# Patient Record
Sex: MALE | Race: WHITE | Employment: UNEMPLOYED | URBAN - METROPOLITAN AREA
[De-identification: names, ages, dates, MRNs, and addresses within clinical notes are randomized per-mention and may not be internally consistent; named-entity substitution may affect disease eponyms.]

---

## 2017-01-07 LAB — CREATININE, EXTERNAL: 0.78

## 2017-01-11 ENCOUNTER — TELEPHONE (OUTPATIENT)
Dept: INTERNAL MEDICINE CLINIC | Age: 57
End: 2017-01-11

## 2017-01-11 NOTE — TELEPHONE ENCOUNTER
Spoke with Pt's sister Chris Deo [phone], states Dr Chelsea Etienne nurse called her that Kenneth's labs yesterday showed a glucose of 346. He has an appt. Fri. 1p since she is off. He is living with her since dec. 1. Their brother Nereida clayton is this sat. Fabian Khan is unsure of Kenneth's medical history, states she thinks he is autistic but I don't see that on his chart. She said he eats whatever he has until it's gone, he never says he's full so she will try to do better with his portions. He drinks a protein shake and prune juice every morning and some milk, drinks ice tea the rest of the day with sweet n low, no water. She may need to try flavored water. Fabian Khan also mentioned she and her  had to take custody of 3 of their grand children last year and she works in [place]. She wants to do whatever they all need and just needs some guidance. Emotional support given, encouraged to call with any questions.

## 2017-01-13 ENCOUNTER — OFFICE VISIT (OUTPATIENT)
Dept: INTERNAL MEDICINE CLINIC | Age: 57
End: 2017-01-13

## 2017-01-13 VITALS
SYSTOLIC BLOOD PRESSURE: 110 MMHG | WEIGHT: 186 LBS | HEART RATE: 104 BPM | RESPIRATION RATE: 20 BRPM | DIASTOLIC BLOOD PRESSURE: 70 MMHG | TEMPERATURE: 98.2 F | BODY MASS INDEX: 28.19 KG/M2 | OXYGEN SATURATION: 97 % | HEIGHT: 68 IN

## 2017-01-13 DIAGNOSIS — E78.00 PURE HYPERCHOLESTEROLEMIA: ICD-10-CM

## 2017-01-13 DIAGNOSIS — I10 ESSENTIAL HYPERTENSION WITH GOAL BLOOD PRESSURE LESS THAN 140/90: ICD-10-CM

## 2017-01-13 DIAGNOSIS — E11.29 DIABETES MELLITUS WITH MICROALBUMINURIA (HCC): Primary | ICD-10-CM

## 2017-01-13 DIAGNOSIS — R80.9 DIABETES MELLITUS WITH MICROALBUMINURIA (HCC): Primary | ICD-10-CM

## 2017-01-13 DIAGNOSIS — G93.1 HYPOXIC BRAIN DAMAGE (HCC): ICD-10-CM

## 2017-01-13 DIAGNOSIS — H61.23 EXCESSIVE CERUMEN IN EAR CANAL, BILATERAL: ICD-10-CM

## 2017-01-13 DIAGNOSIS — E66.3 OVERWEIGHT (BMI 25.0-29.9): ICD-10-CM

## 2017-01-13 NOTE — PATIENT INSTRUCTIONS
Hearing Evaluations: [facility]   At [employer]. Mobile Authentication  [phone]    [facility] (off [address])  [place] ([address])  [place] ([facility])  [place] (off [address])      [address] [facility]   [email or web address]    [phone] --> [address] [facility]  [phone] --> [address] [facility]  ([place])  [phone] --> [phone] [facility]  ([address] [facility])

## 2017-01-13 NOTE — LETTER
[address] Proxy Access Authorization Form Name: Ani Cronin Patient Email: [email or web address] Patient YOB: 1960 Patient MRN: [id number] Name of Proxy: Saint Bowers. lAiyah Spicer Proxy Email: [email or web address] Proxy Address: [facility] German Galeano Proxy : 1967 Proxy SSN:  By signing this [email or web address] Proxy Access Authorization Form (this Authorization), I understand that I am giving permission to the [address] [place] [address] and its controlled affiliates that operate one or more hospitals or physician practices located in [place], [place], [place], [place], [address] [place] [address] or [facility]) to disclose confidential health information contained about me through [email or web address] to the person whose name is designated above (my Proxy). I understand that [employer] is a web-based service through which some (but not all) of the information contained in my i[employer] record [facility]) (to the extent that I have an EMR) may be accessed, and that [employer] sometimes shows a summary or description and not the actual entries in my EMR. I understand that by signing this Authorization, my Proxy will be given electronic access through [employer] to all confidential health information about me that is available through [email or web address] [address], including confidential health information about me that under most circumstances my Proxy would not be able to access without my permission. I understand that I am not required to name a Proxy or sign this Authorization. I further understand that Charlie Alexander may not condition treatment or payment on my willingness to sign this Authorization unless the specific circumstances under which such conditioning is permitted by law are applicable and are set forth in this Authorization.  
 
I understand that this Authorization is valid unless and until I revoke it.  I understand that I have the right to revoke this Authorization at any time, but that my revocation will not be effective until delivered in writing to [facility] at the following address:  
 
[address] If I choose to revoke this Authorization, I understand that my revocation will not be effective as to any MyChart information already disclosed to my Proxy pursuant to this Authorization. I understand that MyChart access is a privilege, not a right, and that my Proxy must agree to comply with the MyChart Terms and Conditions of Patient Use (the Terms and Conditions). [facility] will provide my Proxy a special activation code and instructions for accessing confidential health information about me in [address] The first time my Proxy uses the special activation code, my Proxy must review and accept the Terms and Conditions and the Proxy Disclaimer. If my Proxy does not accept and at all times comply with the Terms and Conditions or does not accept the Proxy Disclaimer each time my Proxy accesses MyChart, I understand that [facility] may deny my Proxy access or revoke my Proxys to access confidential health information about me in [address] I also understand that [facility] may deny my Proxy access or revoke my Proxys access for any reason and at any time in [facility] sole discretion. I understand that my Proxy must sign the Acknowledgement set forth below if my Proxy is in the office with me at the time I complete this request.  If my Proxy is not in the office with me, I understand that my Proxy will be mailed a Proxy Identification Verification for Access to [facility] form at the address I have designated above, and that my Proxy must complete and return the form to [facility] before [facility] will take any additional steps to give my Proxy access information about me in [address] A copy of this Authorization and a notation concerning my Proxy shall be included in my original health records. I understand that confidential health information about me disclosed in MyChart to my Proxy pursuant to this Authorization might be redisclosed by my Proxy and may, as a result of such disclosure, no longer be protected to the same extent as such confidential health information was protected by law while solely in the possession of Iwona Cordon. Signature of Patient or Legal Guardian Date (MM/DD/YYYY) Printed Name of Patient or Legal Guardian Relationship (if not self) ACKNOWLEDGEMENT TO BE COMPLETED BY PROXY IF IN OFFICE: 
I acknowledge and agree that the above information, including my name, e-mail address, date of birth, Social Security Number, and mailing address are true and correct. I further agree to comply with the Terms and Conditions and Proxy Disclaimer. Proxy Signature Date (MM/DD/YYYY) Printed Name of Proxy Identification Document:   
 
__ s License/Government Issued ID   
__ Passport   
__ Picture ID & Social Security Card Identification Document Number _______________________________ Expiration Date ______________

## 2017-01-13 NOTE — PROGRESS NOTES
Follow up. Not checking BS at home. Received a glucose reading of 346 from labs drawn last week. Colonoscopy done in [facility], provider not known, done around 2010. Sister accompanies patient. Asking to have his ears checked for wax.

## 2017-01-13 NOTE — PROGRESS NOTES
Mendel Frisk is a 60 y.o. male who was seen in clinic today (2017). Accompanied by sister. He is now living with her after Ed passed away. Assessment & Plan:  Tate Urias was seen today for diabetes, hypertension and cholesterol problem. Diagnoses and all orders for this visit:    Diabetes mellitus with microalbuminuria ([facility] [address]- control uncertain, previously well controlled but last random labs had BS > 300, home glucose monitoring emphasized, especially over the next 1-2 weeks to get an idea of what his sugars are truly doing and continue current medications pending review of labs. Exclusions: none   -     METABOLIC PANEL, COMPREHENSIVE  -     LIPID PANEL  -     HEMOGLOBIN A1C WITH EAG  -     MICROALBUMIN, UR, RAND W/ MICROALBUMIN/CREA RATIO  -     HM DIABETES FOOT EXAM    Hypoxic brain damage (HCC)- stable    Hypertension- well controlled, continue current treatment pending review of labs     Hyperlipidemia- previously at goal, continue current treatment pending review of labs     Excessive cerumen in ear canal, bilateral- minimal, but will irrigate due to reported decrease in hearing. Did review if no changes should see ENT or audiologist.  No documentation to back it up, but brother reported previously decreased hearing (R>L)  -     [place] REMOVAL IMPACTED CERUMEN IRRIGATION/LVG UNILAT    Overweight (BMI 25.0-29. 9)- stable, needs improvement, discussed the patient's above normal BMI with him. The follow up plan is as follows: I have counseled this patient on diet and exercise regimens       Will address Prevnar at next visit. Follow-up Disposition:  Return in about 6 months (around 2017).    ----------------------------------------------------------------------    Subjective:  Endocrine Review  He is seen for diabetes. Since last visit he reports: his brother . He is now living with his sister.   Home glucose monitoring: is not performed  He reports medication compliance: compliant all of the time.  Medication side effects: none. Diabetic diet compliance: noncompliant much of the time. Lab review: labs reviewed and discussed with patient (snacking regularly). Eye exam: UTD. Cardiovascular Review  The patient has hypertension and hyperlipidemia. He reports taking medications as instructed, no medication side effects noted, patient does not perform home BP monitoring. Diet and Lifestyle: generally follows a low fat low cholesterol diet, generally follows a low sodium diet, sedentary. Lab review: labs reviewed and discussed with patient. Prior to Admission medications    Medication Sig Start Date End Date Taking? Authorizing Provider   JANUMET  mg per tablet TAKE ONE TABLET BY MOUTH TWICE A DAY WITH A MEAL 9/1/16  Yes Solomon Luna MD   lovastatin (MEVACOR) 40 mg tablet TAKE ONE TABLET BY MOUTH EVERY NIGHT AT BEDTIME 9/1/16  Yes Solomon Luna MD   trandolapril ([address]) 2 mg tablet TAKE ONE TABLET BY MOUTH DAILY 8/26/16  Yes Solomon Luna MD   ibrutinib (IMBRUVICA) 140 mg capsule Take 420 mg by mouth. Yes Historical Provider   multivitamin (ONE A DAY) tablet Take 1 Tab by mouth daily. Yes Historical Provider   aspirin delayed-release 81 mg tablet Take 1 Tab by mouth daily. 8/16/16   Solomon Luna MD   Insulin Needles, Disposable, (ULTRA-THIN II INS PEN NEEDLES) 29 x 1/2 \" ndle Lantus 10 units daily. DX:250.00 7/28/15   Solomon Luna MD   Blood-Glucose Meter monitoring kit Test BS daily. DX:250.00 7/28/15   Solomon Luna MD   Lancets misc Test BS daily. DX:250.00 7/28/15   Solomon Luna MD   glucose blood VI test strips (ASCENSIA AUTODISC VI, ONE TOUCH ULTRA TEST VI) strip Test BS daily. DX:250.00 7/28/15   Solomon Luna MD          Allergies   Allergen Reactions    Pcn [Penicillins] Anaphylaxis           Review of Systems   Constitutional: Negative for malaise/fatigue and weight loss. HENT: Positive for hearing loss. Respiratory: Negative for cough and shortness of breath. Cardiovascular: Negative for chest pain and palpitations. Gastrointestinal: Negative for abdominal pain, constipation, diarrhea, nausea and vomiting. Musculoskeletal: Negative for joint pain and myalgias. Objective:   Physical Exam   HENT:   Right ear is: 50% occluded with wet cerumen. Left ear is: 25% occluded with wet cerumen. Cardiovascular: Regular rhythm and normal heart sounds. No murmur heard. Pulmonary/Chest: Effort normal and breath sounds normal. He has no wheezes. He has no rales. Abdominal: Soft. Bowel sounds are normal. He exhibits no mass. There is no hepatosplenomegaly. There is no tenderness. Musculoskeletal: He exhibits no edema. Neurological:   Monofilament intact bilaterally. Pulses R: 1+ and L: 1+. L foot there is hypertrophied skin at the base of the 1st toe. No open lesion   Psychiatric: He has a normal mood and affect. His behavior is normal.         Visit Vitals    /70    Pulse (!) 104    Temp 98.2 °F (36.8 °C) (Oral)    Resp 20    Ht 5' 8\" (1.727 m)    Wt 186 lb (84.4 kg)    SpO2 97%    BMI 28.28 kg/m2         Disclaimer:  Advised him to call back or return to office if symptoms worsen/change/persist.  Discussed expected course/resolution/complications of diagnosis in detail with patient. Medication risks/benefits/costs/interactions/alternatives discussed with patient. He was given an after visit summary which includes diagnoses, current medications, & vitals. He expressed understanding with the diagnosis and plan.         Armand Light MD

## 2017-01-21 ENCOUNTER — HOSPITAL ENCOUNTER (OUTPATIENT)
Dept: LAB | Age: 57
Discharge: HOME | End: 2017-01-21
Payer: MEDICARE

## 2017-01-21 PROCEDURE — 80053 COMPREHEN METABOLIC PANEL: CPT

## 2017-01-21 PROCEDURE — 83036 HEMOGLOBIN GLYCOSYLATED A1C: CPT

## 2017-01-21 PROCEDURE — 36415 COLL VENOUS BLD VENIPUNCTURE: CPT

## 2017-01-21 PROCEDURE — 82043 UR ALBUMIN QUANTITATIVE: CPT

## 2017-01-21 PROCEDURE — 80061 LIPID PANEL: CPT

## 2017-01-23 LAB
ALBUMIN SERPL-MCNC: 4.6 G/DL (ref 3.5–5.5)
ALBUMIN/CREAT UR: 32.3 MG/G CREAT (ref 0–30)
ALBUMIN/GLOB SERPL: 2.4 {RATIO} (ref 1.1–2.5)
ALP SERPL-CCNC: 109 IU/L (ref 39–117)
ALT SERPL-CCNC: 27 IU/L (ref 0–44)
AST SERPL-CCNC: 21 IU/L (ref 0–40)
BILIRUB SERPL-MCNC: 0.6 MG/DL (ref 0–1.2)
BUN SERPL-MCNC: 15 MG/DL (ref 6–24)
BUN/CREAT SERPL: 19 (ref 9–20)
CALCIUM SERPL-MCNC: 9.7 MG/DL (ref 8.7–10.2)
CHLORIDE SERPL-SCNC: 100 MMOL/L (ref 96–106)
CHOLEST SERPL-MCNC: 111 MG/DL (ref 100–199)
CO2 SERPL-SCNC: 24 MMOL/L (ref 18–29)
CREAT SERPL-MCNC: 0.77 MG/DL (ref 0.76–1.27)
CREAT UR-MCNC: 147 MG/DL
EST. AVERAGE GLUCOSE BLD GHB EST-MCNC: 171 MG/DL
GLOBULIN SER CALC-MCNC: 1.9 G/DL (ref 1.5–4.5)
GLUCOSE SERPL-MCNC: 140 MG/DL (ref 65–99)
HBA1C MFR BLD: 7.6 % (ref 4.8–5.6)
HDLC SERPL-MCNC: 24 MG/DL
LDLC SERPL CALC-MCNC: 70 MG/DL (ref 0–99)
MICROALBUMIN UR-MCNC: 47.5 UG/ML
POTASSIUM SERPL-SCNC: 4.5 MMOL/L (ref 3.5–5.2)
PROT SERPL-MCNC: 6.5 G/DL (ref 6–8.5)
SODIUM SERPL-SCNC: 142 MMOL/L (ref 134–144)
TRIGL SERPL-MCNC: 86 MG/DL (ref 0–149)
VLDLC SERPL CALC-MCNC: 17 MG/DL (ref 5–40)

## 2017-01-23 RX ORDER — GLIPIZIDE 5 MG/1
5 TABLET, FILM COATED, EXTENDED RELEASE ORAL DAILY
Qty: 30 TAB | Refills: 5 | Status: SHIPPED | OUTPATIENT
Start: 2017-01-23 | End: 2017-07-05 | Stop reason: SDUPTHER

## 2017-01-24 NOTE — PROGRESS NOTES
Results released to patient via [employer]. Also please call pt's sister. All labs are stable or at goal for him, except for worsening DM control. Would not restart insulin, but add in Glipizide and work on diet.

## 2017-01-26 NOTE — PROGRESS NOTES
Called and spoke to pt sister and gave lab results and instructions of glipizide. She states he is now currently taking glipizide.

## 2017-07-05 RX ORDER — GLIPIZIDE 5 MG/1
TABLET, FILM COATED, EXTENDED RELEASE ORAL
Qty: 30 TAB | Refills: 5 | Status: SHIPPED | OUTPATIENT
Start: 2017-07-05 | End: 2017-10-22 | Stop reason: ALTCHOICE

## 2017-07-21 ENCOUNTER — HOSPITAL ENCOUNTER (OUTPATIENT)
Dept: LAB | Age: 57
Discharge: HOME | End: 2017-07-21
Payer: MEDICARE

## 2017-07-21 ENCOUNTER — OFFICE VISIT (OUTPATIENT)
Dept: INTERNAL MEDICINE CLINIC | Age: 57
End: 2017-07-21

## 2017-07-21 VITALS
DIASTOLIC BLOOD PRESSURE: 80 MMHG | HEIGHT: 68 IN | TEMPERATURE: 98.1 F | RESPIRATION RATE: 20 BRPM | BODY MASS INDEX: 29.1 KG/M2 | SYSTOLIC BLOOD PRESSURE: 104 MMHG | HEART RATE: 120 BPM | WEIGHT: 192 LBS | OXYGEN SATURATION: 96 %

## 2017-07-21 DIAGNOSIS — E78.00 PURE HYPERCHOLESTEROLEMIA: ICD-10-CM

## 2017-07-21 DIAGNOSIS — I10 HYPERTENSION, ESSENTIAL: ICD-10-CM

## 2017-07-21 DIAGNOSIS — R05.9 COUGH: ICD-10-CM

## 2017-07-21 DIAGNOSIS — C91.10 CLL (CHRONIC LYMPHOCYTIC LEUKEMIA) (HCC): ICD-10-CM

## 2017-07-21 DIAGNOSIS — Z23 ENCOUNTER FOR IMMUNIZATION: ICD-10-CM

## 2017-07-21 DIAGNOSIS — R80.9 DIABETES MELLITUS WITH MICROALBUMINURIA (HCC): Primary | ICD-10-CM

## 2017-07-21 DIAGNOSIS — Z12.11 COLON CANCER SCREENING: ICD-10-CM

## 2017-07-21 DIAGNOSIS — E11.29 DIABETES MELLITUS WITH MICROALBUMINURIA (HCC): Primary | ICD-10-CM

## 2017-07-21 PROCEDURE — 83036 HEMOGLOBIN GLYCOSYLATED A1C: CPT

## 2017-07-21 PROCEDURE — 80053 COMPREHEN METABOLIC PANEL: CPT

## 2017-07-21 RX ORDER — ALBUTEROL SULFATE 90 UG/1
1-2 AEROSOL, METERED RESPIRATORY (INHALATION)
Qty: 1 INHALER | Refills: 0 | Status: SHIPPED | OUTPATIENT
Start: 2017-07-21 | End: 2021-05-05

## 2017-07-21 RX ORDER — BENZONATATE 200 MG/1
200 CAPSULE ORAL
Qty: 21 CAP | Refills: 1 | Status: SHIPPED | OUTPATIENT
Start: 2017-07-21 | End: 2017-07-28

## 2017-07-21 NOTE — MR AVS SNAPSHOT
Visit Information Date & Time Provider Department Dept. Phone Encounter #  
 7/21/2017 12:30 PM Eleanor Jordan, [facility] Internal Medicine [phone] [id number] Follow-up Instructions Return in about 6 months (around 1/21/2018) for FULL PHYSICAL - 30 minutes. Upcoming Health Maintenance Date Due FOBT Q 1 YEAR AGE 50-75 3/20/2008 Pneumococcal 19-64 Highest Risk (2 of 3 - PCV13) 1/12/2017 HEMOGLOBIN A1C Q6M 7/21/2017 EYE EXAM RETINAL OR DILATED Q1 7/25/2017 INFLUENZA AGE 9 TO ADULT 8/1/2017 FOOT EXAM Q1 1/13/2018 MICROALBUMIN Q1 1/21/2018 LIPID PANEL Q1 1/21/2018 DTaP/Tdap/Td series (2 - Td) 4/5/2026 Allergies as of 7/21/2017  Review Complete On: 7/21/2017 By: Eleanor Jordan MD  
  
 Severity Noted Reaction Type Reactions Pcn [Penicillins] High 06/17/2014    Anaphylaxis Current Immunizations  Reviewed on 7/21/2017 Name Date Influenza Vaccine 2/2/2015, 1/1/2013 Pneumococcal Polysaccharide (PPSV-23) 1/12/2016 Td 1/1/2010 Tdap 4/5/2016 Reviewed by Wiley Dandy, RN on 7/21/2017 at 12:34 PM  
You Were Diagnosed With   
  
 Codes Comments Diabetes mellitus with microalbuminuria (HCC)    -  Primary ICD-10-CM: E11.29, R80.9 ICD-9-CM: 250.40, 791.0   
 CLL [facility] lymphocytic leukemia) [facility]     ICD-10-CM: C91.10 ICD-9-CM: 204.10 Hypertension, essential     ICD-10-CM: I10 
ICD-9-CM: 401.9 Pure hypercholesterolemia     ICD-10-CM: E78.00 ICD-9-CM: 272.0 Cough     ICD-10-CM: R05 ICD-9-CM: 786.2 Colon cancer screening     ICD-10-CM: Z12.11 ICD-9-CM: V76.51 Encounter for immunization     ICD-10-CM: U84 ICD-9-CM: V03.89 Vitals BP Pulse Temp Resp Height(growth percentile) Weight(growth percentile) 104/80 (!) 120 98.1 °F (36.7 °C) (Oral) 20 5' 8\" (1.727 m) 192 lb (87.1 kg) SpO2 BMI Smoking Status 96% 29.19 kg/m2 Never Smoker BMI and BSA Data Body Mass Index Body Surface Area  
 29.19 kg/m 2 2.04 m 2 Preferred Pharmacy Pharmacy Name Phone [id number] Norma Osuna, [address] [place] [phone] Your Updated Medication List  
  
   
This list is accurate as of: 17 12:57 PM.  Always use your most recent med list.  
  
  
  
  
 aspirin delayed-release 81 mg tablet Take 1 Tab by mouth daily. benzonatate 200 mg capsule Commonly known as:  TESSALON Take 1 Cap by mouth three (3) times daily as needed for Cough for up to 7 days. Blood-Glucose Meter monitoring kit Test BS daily. DX:250.00  
  
 glipiZIDE SR 5 mg CR tablet Commonly known as:  GLUCOTROL XL  
TAKE 1 TAB BY MOUTH DAILY. glucose blood VI test strips strip Commonly known as:  ASCENSIA AUTODISC VI, ONE TOUCH ULTRA TEST VI Test BS daily. DX:250.00 IMBRUVICA 140 mg capsule Generic drug:  ibrutinib Take 420 mg by mouth. Lancets Misc Test BS daily. DX:250.00  
  
 lovastatin 40 mg tablet Commonly known as:  MEVACOR Take 1 Tab by mouth daily. multivitamin tablet Commonly known as:  ONE A DAY Take 1 Tab by mouth daily. pneumococcal 13 leny conj dip 0.5 mL Syrg injection Commonly known as:  PREVNAR-13  
0.5 mL by IntraMUSCular route once for 1 dose. SITagliptin-metFORMIN  mg per tablet Commonly known as:  [place] Take 1 Tab by mouth two (2) times daily (with meals). trandolapril 2 mg tablet Commonly known as:  Lexii Osuna Take 1 Tab by mouth daily. Prescriptions Printed Refills  
 pneumococcal 13 leny conj dip (PREVNAR-13) 0.5 mL syrg injection 0 Si.5 mL by IntraMUSCular route once for 1 dose. Class: Print Route: IntraMUSCular Prescriptions Sent to Pharmacy Refills  
 benzonatate (TESSALON) 200 mg capsule 1 Sig: Take 1 Cap by mouth three (3) times daily as needed for Cough for up to 7 days.   
 Class: Normal  
 [facility] [place] #: [phone] Route: Oral  
  
We Performed the Following HEMOGLOBIN A1C WITH EAG [40193 CPT(R)]  DIABETES FOOT EXAM [HM7 Custom] METABOLIC PANEL, COMPREHENSIVE [75776 CPT(R)] OCCULT BLOOD, IMMUNOASSAY (FIT) [id number] CPT(R)] Follow-up Instructions Return in about 6 months (around 2018) for FULL PHYSICAL - 30 minutes. Introducing [facility]! Dear Amira Amaro: Thank you for requesting a [employer] account. Our records indicate that you already have an active Image[employer] account. You can access your account anytime at [email or web address]. [email or web address]/[email or web address] Did you know that you can access your hospital and ER discharge instructions at any time in [employer]? You can also review all of your test results from your hospital stay or ER visit. Additional Information If you have questions, please visit the Frequently Asked Questions section of the Image[employer] website at [email or web address] Remember, Image[employer] is NOT to be used for urgent needs. For medical emergencies, dial 911. Now available from your iPhone and Android! Please provide this summary of care documentation to your next provider. Your primary care clinician is listed as Pam Reed. If you have any questions after today's visit, please call [phone].

## 2017-07-22 LAB
ALBUMIN SERPL-MCNC: 3.8 G/DL (ref 3.5–5.5)
ALBUMIN/GLOB SERPL: 1.9 {RATIO} (ref 1.2–2.2)
ALP SERPL-CCNC: 112 IU/L (ref 39–117)
ALT SERPL-CCNC: 54 IU/L (ref 0–44)
AST SERPL-CCNC: 30 IU/L (ref 0–40)
BILIRUB SERPL-MCNC: 0.7 MG/DL (ref 0–1.2)
BUN SERPL-MCNC: 16 MG/DL (ref 6–24)
BUN/CREAT SERPL: 20 (ref 9–20)
CALCIUM SERPL-MCNC: 9.2 MG/DL (ref 8.7–10.2)
CHLORIDE SERPL-SCNC: 99 MMOL/L (ref 96–106)
CO2 SERPL-SCNC: 20 MMOL/L (ref 18–29)
CREAT SERPL-MCNC: 0.81 MG/DL (ref 0.76–1.27)
EST. AVERAGE GLUCOSE BLD GHB EST-MCNC: 197 MG/DL
GLOBULIN SER CALC-MCNC: 2 G/DL (ref 1.5–4.5)
GLUCOSE SERPL-MCNC: 328 MG/DL (ref 65–99)
HBA1C MFR BLD: 8.5 % (ref 4.8–5.6)
POTASSIUM SERPL-SCNC: 4.6 MMOL/L (ref 3.5–5.2)
PROT SERPL-MCNC: 5.8 G/DL (ref 6–8.5)
SODIUM SERPL-SCNC: 139 MMOL/L (ref 134–144)

## 2017-07-23 NOTE — PROGRESS NOTES
Please call patient's sister. In the year his diabetes control has gone from great to horrible. It is worse then it was 1 year ago. He needs to f/u in 3 months. He needs to increase Glipizide to 10mg, 1 tab PO BID, #180, RF0. If this does not help he will need to start insulin. Daughter can get into see DM education class if she needs help.

## 2017-07-24 ENCOUNTER — TELEPHONE (OUTPATIENT)
Dept: INTERNAL MEDICINE CLINIC | Age: 57
End: 2017-07-24

## 2017-07-24 RX ORDER — GLIPIZIDE 10 MG/1
10 TABLET ORAL 2 TIMES DAILY
Qty: 180 TAB | Refills: 0 | Status: SHIPPED | OUTPATIENT
Start: 2017-07-24 | End: 2017-10-22 | Stop reason: SDUPTHER

## 2017-07-24 NOTE — PROGRESS NOTES
Called and spoke with pt sister and gave lab results and pt sister verbalized understanding. New prescription for glipizide has been sent to pharmacy.

## 2017-08-18 RX ORDER — SITAGLIPTIN AND METFORMIN HYDROCHLORIDE 500; 50 MG/1; MG/1
TABLET, FILM COATED ORAL
Qty: 180 TAB | Refills: 0 | Status: SHIPPED | OUTPATIENT
Start: 2017-08-18 | End: 2017-11-20 | Stop reason: SDUPTHER

## 2017-08-18 RX ORDER — TRANDOLAPRIL 2 MG/1
TABLET ORAL
Qty: 90 TAB | Refills: 0 | Status: SHIPPED | OUTPATIENT
Start: 2017-08-18 | End: 2017-11-20 | Stop reason: SDUPTHER

## 2017-08-18 RX ORDER — LOVASTATIN 40 MG/1
TABLET ORAL
Qty: 90 TAB | Refills: 0 | Status: SHIPPED | OUTPATIENT
Start: 2017-08-18 | End: 2017-11-20 | Stop reason: SDUPTHER

## 2017-08-18 NOTE — TELEPHONE ENCOUNTER
Refills sent to local pharmacy per verbal order of Dr. Renetta Begum. Patient has follow up appointment scheduled for 10/27/17.

## 2017-10-22 RX ORDER — GLIPIZIDE 10 MG/1
TABLET ORAL
Qty: 180 TAB | Refills: 1 | Status: SHIPPED | OUTPATIENT
Start: 2017-10-22 | End: 2018-05-07 | Stop reason: SDUPTHER

## 2017-11-03 ENCOUNTER — HOSPITAL ENCOUNTER (OUTPATIENT)
Dept: LAB | Age: 57
Discharge: HOME | End: 2017-11-03
Payer: MEDICARE

## 2017-11-03 ENCOUNTER — OFFICE VISIT (OUTPATIENT)
Dept: INTERNAL MEDICINE CLINIC | Age: 57
End: 2017-11-03

## 2017-11-03 VITALS
HEART RATE: 90 BPM | RESPIRATION RATE: 18 BRPM | SYSTOLIC BLOOD PRESSURE: 120 MMHG | OXYGEN SATURATION: 96 % | BODY MASS INDEX: 28.04 KG/M2 | HEIGHT: 68 IN | DIASTOLIC BLOOD PRESSURE: 76 MMHG | TEMPERATURE: 97.4 F | WEIGHT: 185 LBS

## 2017-11-03 DIAGNOSIS — E11.29 DIABETES MELLITUS WITH MICROALBUMINURIA (HCC): Primary | ICD-10-CM

## 2017-11-03 DIAGNOSIS — R80.9 DIABETES MELLITUS WITH MICROALBUMINURIA (HCC): Primary | ICD-10-CM

## 2017-11-03 DIAGNOSIS — E66.3 OVERWEIGHT (BMI 25.0-29.9): ICD-10-CM

## 2017-11-03 PROCEDURE — 80048 BASIC METABOLIC PNL TOTAL CA: CPT

## 2017-11-03 PROCEDURE — 83036 HEMOGLOBIN GLYCOSYLATED A1C: CPT

## 2017-11-03 NOTE — PROGRESS NOTES
Nabor Hamilton is a 59 y.o. male who was seen in clinic today (11/3/2017). RTC w/ sister. Assessment & Plan:  Diagnoses and all orders for this visit:    1. Diabetes mellitus with microalbuminuria ([address]- uncontrolled and had been worsening, long term diabetic complications discussed and continue current plan pending review of labs, hopeful w/ weight loss & diet changes will not need to make any changes. Reviewed with sister role of medications and expectations with both of them. .     -     METABOLIC PANEL, BASIC  -     HEMOGLOBIN A1C WITH EAG    2. Overweight (BMI 25.0-29. 9)- improved, congratulated, I have reviewed/discussed the above normal BMI with the patient. I have recommended the following interventions: encourage exercise and lifestyle education regarding diet. Follow-up Disposition:  Return in about 3 months (around 1/26/2018) for FULL PHYSICAL - 30 minutes. ----------------------------------------------------------------------    Subjective:  Donn Crystal was seen today for Diabetes    Endocrine Review  He is seen for diabetes. Since last visit he reports: weight has decreased. Home glucose monitoring: is not performed. He reports medication compliance: compliant all of the time. Medication side effects: none. Diabetic diet compliance: compliant all of the time. Lab review: labs reviewed and discussed with patient. Prior to Admission medications    Medication Sig Start Date End Date Taking? Authorizing Provider   glipiZIDE (GLUCOTROL) 10 mg tablet TAKE 1 TAB BY MOUTH TWO (2) TIMES A DAY. 10/22/17  Yes Avery Colvin MD   JANUMET  mg per tablet TAKE 1 TAB BY MOUTH TWO (2) TIMES DAILY (WITH MEALS). 8/18/17  Yes Avery Colvin MD   lovastatin (MEVACOR) 40 mg tablet TAKE 1 TAB BY MOUTH DAILY. 8/18/17  Yes Avery Colvin MD   trandolapril (MAVIK) 2 mg tablet TAKE 1 TAB BY MOUTH DAILY.  8/18/17  Yes Avery Colvin MD   albuterol (PROVENTIL HFA, VENTOLIN HFA, PROAIR HFA) 90 mcg/actuation inhaler Take 1-2 Puffs by inhalation every six (6) hours as needed for Shortness of Breath (coughing). 7/21/17  Yes Beto De Los Santos MD   ibrutinib (IMBRUVICA) 140 mg capsule Take 420 mg by mouth. Yes Historical Provider   multivitamin (ONE A DAY) tablet Take 1 Tab by mouth daily. Yes Historical Provider   Lancets misc Test BS daily. DX:250.00 2/22/17   Beto De Los Santos MD   glucose blood VI test strips (ASCENSIA AUTODISC VI, ONE TOUCH ULTRA TEST VI) strip Test BS daily. DX:250.00 2/22/17   Beto De Los Santos MD   aspirin delayed-release 81 mg tablet Take 1 Tab by mouth daily. 8/16/16   Beto De Los Santos MD   Blood-Glucose Meter monitoring kit Test BS daily. DX:250.00 7/28/15   Beto De Los Santos MD          Allergies   Allergen Reactions    Pcn [Penicillins] Anaphylaxis           Review of Systems   Respiratory: Negative for cough and shortness of breath. Cardiovascular: Negative for chest pain and palpitations. Gastrointestinal: Negative for abdominal pain, constipation, diarrhea, nausea and vomiting. Objective:   Physical Exam   Constitutional: No distress. Eyes: Conjunctivae are normal. No scleral icterus. Cardiovascular: Regular rhythm and normal heart sounds. No murmur heard. Pulmonary/Chest: Effort normal and breath sounds normal. He has no wheezes. He has no rales. Abdominal: Soft. Bowel sounds are normal. He exhibits no mass. There is no hepatosplenomegaly. There is no tenderness. Musculoskeletal: He exhibits no edema. Visit Vitals    /76    Pulse 90    Temp 97.4 °F (36.3 °C) (Oral)    Resp 18    Ht 5' 8\" (1.727 m)    Wt 185 lb (83.9 kg)    SpO2 96%    BMI 28.13 kg/m2         Disclaimer:  Advised him to call back or return to office if symptoms worsen/change/persist.  Discussed expected course/resolution/complications of diagnosis in detail with patient.     Medication risks/benefits/costs/interactions/alternatives discussed with patient. He was given an after visit summary which includes diagnoses, current medications, & vitals. He expressed understanding with the diagnosis and plan.         Justyna Rios MD

## 2017-11-03 NOTE — PATIENT INSTRUCTIONS

## 2017-11-03 NOTE — MR AVS SNAPSHOT
Visit Information Date & Time Provider Department Dept. Phone Encounter #  
 11/3/2017  9:15 AM Armand Light, [address] [facility] [phone] [id number] Follow-up Instructions Return in about 3 months (around 1/26/2018) for FULL PHYSICAL - 30 minutes. Your Appointments 1/26/2018  2:30 PM  
Medicare Physical with Armand Light MD  
[facility] [address]) Appt Note: [id number] [facility] [address] [facility] [address]  
[phone] [id number] [facility] [address] Upcoming Health Maintenance Date Due FOBT Q 1 YEAR AGE 50-75 3/20/2008 Pneumococcal 19-64 Highest Risk (2 of 3 - PCV13) 1/12/2017 HEMOGLOBIN A1C Q6M 1/21/2018 MICROALBUMIN Q1 1/21/2018 LIPID PANEL Q1 1/21/2018 FOOT EXAM Q1 7/21/2018 EYE EXAM RETINAL OR DILATED Q1 10/11/2018 DTaP/Tdap/Td series (2 - Td) 4/5/2026 Allergies as of 11/3/2017  Review Complete On: 11/3/2017 By: Marino Hamman, RN Severity Noted Reaction Type Reactions Pcn [Penicillins] High 06/17/2014    Anaphylaxis Current Immunizations  Reviewed on 11/3/2017 Name Date Influenza Vaccine 10/13/2017, 2/2/2015, 1/1/2013 Pneumococcal Polysaccharide (PPSV-23) 1/12/2016 Td 1/1/2010 Tdap 4/5/2016 Reviewed by Marino Hamman, RN on 11/3/2017 at  9:25 AM  
You Were Diagnosed With   
  
 Codes Comments Diabetes mellitus with microalbuminuria (HCC)    -  Primary ICD-10-CM: E11.29, R80.9 ICD-9-CM: 250.40, 791.0 Overweight (BMI 25.0-29. 9)     ICD-10-CM: L45.9 ICD-9-CM: 278.02 Vitals BP Pulse Temp Resp Height(growth percentile) Weight(growth percentile) 120/76 90 97.4 °F (36.3 °C) (Oral) 18 5' 8\" (1.727 m) 185 lb (83.9 kg) SpO2 BMI Smoking Status 96% 28.13 kg/m2 Never Smoker Vitals History BMI and BSA Data  Body Mass Index Body Surface Area  
 28.13 kg/m 2 2.01 m 2  
 Preferred Pharmacy Pharmacy Name Phone [id number] Connor Katz, [address] [facility] [facility] [phone] Your Updated Medication List  
  
   
This list is accurate as of: 11/3/17  9:44 AM.  Always use your most recent med list.  
  
  
  
  
 albuterol 90 mcg/actuation inhaler Commonly known as:  PROVENTIL HFA, VENTOLIN HFA, PROAIR HFA Take 1-2 Puffs by inhalation every six (6) hours as needed for Shortness of Breath (coughing). aspirin delayed-release 81 mg tablet Take 1 Tab by mouth daily. Blood-Glucose Meter monitoring kit Test BS daily. DX:250.00  
  
 glipiZIDE 10 mg tablet Commonly known as:  GLUCOTROL  
TAKE 1 TAB BY MOUTH TWO (2) TIMES A DAY. glucose blood VI test strips strip Commonly known as:  ASCENSIA AUTODISC VI, ONE TOUCH ULTRA TEST VI Test BS daily. DX:250.00 IMBRUVICA 140 mg capsule Generic drug:  ibrutinib Take 420 mg by mouth. JANUMET  mg per tablet Generic drug:  SITagliptin-metFORMIN  
TAKE 1 TAB BY MOUTH TWO (2) TIMES DAILY (WITH MEALS). Lancets Misc Test BS daily. DX:250.00  
  
 lovastatin 40 mg tablet Commonly known as:  MEVACOR  
TAKE 1 TAB BY MOUTH DAILY. multivitamin tablet Commonly known as:  ONE A DAY Take 1 Tab by mouth daily. trandolapril 2 mg tablet Commonly known as:  MAVIK  
TAKE 1 TAB BY MOUTH DAILY. We Performed the Following HEMOGLOBIN A1C WITH EAG [06693 CPT(R)] METABOLIC PANEL, BASIC [67314 CPT(R)] Follow-up Instructions Return in about 3 months (around 1/26/2018) for FULL PHYSICAL - 30 minutes. Patient Instructions Nutrition Tips for Diabetes: After Your Visit Your Care Instructions A healthy diet is important to manage diabetes. It helps you lose weight (if you need to) and keep it off. It gives you the nutrition and energy your body needs and helps prevent heart disease.  But a diet for diabetes does not mean that you have to eat special foods. You can eat what your family eats, including occasional sweets and other favorites. But you do have to pay attention to how often you eat and how much you eat of certain foods. The right plan for you will give you meals that help you keep your blood sugar at healthy levels. Try to eat a variety of foods and to spread carbohydrate throughout the day. Carbohydrate raises blood sugar higher and more quickly than any other nutrient does. Carbohydrate is found in sugar, breads and cereals, fruit, starchy vegetables such as potatoes and corn, and milk and yogurt. You may want to work with a dietitian or diabetes educator to help you plan meals and snacks. A dietitian or diabetes educator also can help you lose weight if that is one of your goals. The following tips can help you enjoy your meals and stay healthy. Follow-up care is a key part of your treatment and safety. Be sure to make and go to all appointments, and call your doctor if you are having problems. Its also a good idea to know your test results and keep a list of the medicines you take. How can you care for yourself at home? · Learn which foods have carbohydrate and how much carbohydrate to eat. A dietitian or diabetes educator can help you learn to keep track of how much carbohydrate you eat. · Spread carbohydrate throughout the day. Eat some carbohydrate at all meals, but do not eat too much at any one time. · Plan meals to include food from all the food groups. These are the food groups and some example portion sizes: ¨ Grains: 1 slice of bread (1 ounce), ½ cup of cooked cereal, and 1/3 cup of cooked pasta or rice. These have about 15 grams of carbohydrate in a serving. Choose whole grains such as whole wheat bread or crackers, oatmeal, and brown rice more often than refined grains.  
¨ Fruit: 1 small fresh fruit, such as an apple or orange; ½ of a banana; ½ cup of chopped, cooked, or canned fruit; ½ cup of fruit juice; 1 cup of melon or raspberries; and 2 tablespoons of dried fruit. These have about 15 grams of carbohydrate in a serving. ¨ Dairy: 1 cup of nonfat or low-fat milk and 2/3 cup of plain yogurt. These have about 15 grams of carbohydrate in a serving. ¨ Protein foods: Beef, chicken, turkey, fish, eggs, tofu, cheese, cottage cheese, and peanut butter. A serving size of meat is 3 ounces, which is about the size of a deck of cards. Examples of meat substitute serving sizes (equal to 1 ounce of meat) are 1/4 cup of cottage cheese, 1 egg, 1 tablespoon of peanut butter, and ½ cup of tofu. These have very little or no carbohydrate per serving. ¨ Vegetables: Starchy vegetables such as ½ cup of cooked dried beans, peas, potatoes, or corn have about 15 grams of carbohydrate. Nonstarchy vegetables have very little carbohydrate, such as 1 cup of raw leafy vegetables (such as spinach), ½ cup of other vegetables (cooked or chopped), and 3/4 cup of vegetable juice. · Use the plate format to plan meals. It is a good, quick way to make sure that you have a balanced meal. It also helps you spread carbohydrate throughout the day. You divide your plate by types of foods. Put vegetables on half the plate, meat or meat substitutes on one-quarter of the plate, and a grain or starchy vegetable (such as brown rice or a potato) in the final quarter of the plate. To this you can add a small piece of fruit and 1 cup of milk or yogurt, depending on how much carbohydrate you are supposed to eat at a meal. 
· Talk to your dietitian or diabetes educator about ways to add limited amounts of sweets into your meal plan. You can eat these foods now and then, as long as you include the amount of carbohydrate they have in your daily carbohydrate allowance.  
· If you drink alcohol, limit it to no more than 1 drink a day for women and 2 drinks a day for men. If you are pregnant, no amount of alcohol is known to be safe. · Protein, fat, and fiber do not raise blood sugar as much as carbohydrate does. If you eat a lot of these nutrients in a meal, your blood sugar will rise more slowly than it would otherwise. · Limit saturated fats, such as those from meat and dairy products. Try to replace it with monounsaturated fat, such as olive oil. This is a healthier choice because people who have diabetes are at higher-than-average risk of heart disease. But use a modest amount of olive oil. A tablespoon of olive oil has 14 grams of fat and 120 calories. · Exercise lowers blood sugar. If you take insulin by shots or pump, you can use less than you would if you were not exercising. Keep in mind that timing matters. If you exercise within 1 hour after a meal, your body may need less insulin for that meal than it would if you exercised 3 hours after the meal. Test your blood sugar to find out how exercise affects your need for insulin. · Exercise on most days of the week. Aim for at least 30 minutes. Exercise helps you stay at a healthy weight and helps your body use insulin. Walking is an easy way to get exercise. Gradually increase the amount you walk every day. You also may want to swim, bike, or do other activities. When you eat out · Learn to estimate the serving sizes of foods that have carbohydrate. If you measure food at home, it will be easier to estimate the amount in a serving of restaurant food. · If the meal you order has too much carbohydrate (such as potatoes, corn, or baked beans), ask to have a low-carbohydrate food instead. Ask for a salad or green vegetables. · If you use insulin, check your blood sugar before and after eating out to help you plan how much to eat in the future. · If you eat more carbohydrate at a meal than you had planned, take a walk or do other exercise. This will help lower your blood sugar. Where can you learn more? Go to Etu6.com.be Enter C231 in the search box to learn more about \"Nutrition Tips for Diabetes: After Your Visit. \"  
© 6255-8178 Healthwise, Incorporated. Care instructions adapted under license by Jovanny Kwon (which disclaims liability or warranty for this information). This care instruction is for use with your licensed healthcare professional. If you have questions about a medical condition or this instruction, always ask your healthcare professional. [address] any warranty or liability for your use of this information. Content Version: 81.6.564584; Current as of: June 4, 2014 Introducing [facility]! Dear Milagro Menchaca: Thank you for requesting a [employer] account. Our records indicate that you already have an active [employer] account. You can access your account anytime at [email or web address] Did you know that you can access your hospital and ER discharge instructions at any time in [employer] You can also review all of your test results from your hospital stay or ER visit. Additional Information If you have questions, please visit the Frequently Asked Questions section of the [employer] website at [email or web address] Remember, [employer] is NOT to be used for urgent needs. For medical emergencies, dial 911. Now available from your iPhone and Android! Please provide this summary of care documentation to your next provider. Your primary care clinician is listed as Laila Michaud. If you have any questions after today's visit, please call [phone].

## 2017-11-04 LAB
BUN SERPL-MCNC: 12 MG/DL (ref 6–24)
BUN/CREAT SERPL: 15 (ref 9–20)
CALCIUM SERPL-MCNC: 9.5 MG/DL (ref 8.7–10.2)
CHLORIDE SERPL-SCNC: 101 MMOL/L (ref 96–106)
CO2 SERPL-SCNC: 23 MMOL/L (ref 18–29)
CREAT SERPL-MCNC: 0.82 MG/DL (ref 0.76–1.27)
EST. AVERAGE GLUCOSE BLD GHB EST-MCNC: 140 MG/DL
GFR SERPLBLD CREATININE-BSD FMLA CKD-EPI: 112 ML/MIN/1.73
GFR SERPLBLD CREATININE-BSD FMLA CKD-EPI: 97 ML/MIN/1.73
GLUCOSE SERPL-MCNC: 100 MG/DL (ref 65–99)
HBA1C MFR BLD: 6.5 % (ref 4.8–5.6)
POTASSIUM SERPL-SCNC: 4.7 MMOL/L (ref 3.5–5.2)
SODIUM SERPL-SCNC: 144 MMOL/L (ref 134–144)

## 2017-11-06 NOTE — PROGRESS NOTES
Results released to patient via [employer]. All labs are stable or at goal for him. A1c greatly improved w/ diet changes. No medication changes.

## 2017-11-20 RX ORDER — SITAGLIPTIN AND METFORMIN HYDROCHLORIDE 500; 50 MG/1; MG/1
TABLET, FILM COATED ORAL
Qty: 180 TAB | Refills: 1 | Status: SHIPPED | OUTPATIENT
Start: 2017-11-20 | End: 2018-05-18 | Stop reason: SDUPTHER

## 2017-11-20 RX ORDER — TRANDOLAPRIL 2 MG/1
TABLET ORAL
Qty: 90 TAB | Refills: 1 | Status: SHIPPED | OUTPATIENT
Start: 2017-11-20 | End: 2018-05-07 | Stop reason: SDUPTHER

## 2017-11-20 RX ORDER — LOVASTATIN 40 MG/1
TABLET ORAL
Qty: 90 TAB | Refills: 1 | Status: SHIPPED | OUTPATIENT
Start: 2017-11-20 | End: 2018-05-24 | Stop reason: SDUPTHER

## 2017-12-27 RX ORDER — GLIPIZIDE 5 MG/1
TABLET, FILM COATED, EXTENDED RELEASE ORAL
Qty: 30 TAB | Refills: 0 | OUTPATIENT
Start: 2017-12-27

## 2018-01-26 ENCOUNTER — OFFICE VISIT (OUTPATIENT)
Dept: INTERNAL MEDICINE CLINIC | Age: 58
End: 2018-01-26

## 2018-01-26 VITALS
OXYGEN SATURATION: 98 % | WEIGHT: 189 LBS | DIASTOLIC BLOOD PRESSURE: 76 MMHG | HEART RATE: 92 BPM | BODY MASS INDEX: 29.66 KG/M2 | HEIGHT: 67 IN | SYSTOLIC BLOOD PRESSURE: 132 MMHG | TEMPERATURE: 97.8 F | RESPIRATION RATE: 18 BRPM

## 2018-01-26 DIAGNOSIS — Z71.89 ACP (ADVANCE CARE PLANNING): ICD-10-CM

## 2018-01-26 DIAGNOSIS — Z00.00 MEDICARE ANNUAL WELLNESS VISIT, SUBSEQUENT: Primary | ICD-10-CM

## 2018-01-26 DIAGNOSIS — R39.9 LOWER URINARY TRACT SYMPTOMS (LUTS): ICD-10-CM

## 2018-01-26 DIAGNOSIS — Z12.5 SPECIAL SCREENING FOR MALIGNANT NEOPLASM OF PROSTATE: ICD-10-CM

## 2018-01-26 DIAGNOSIS — E11.29 DIABETES MELLITUS WITH MICROALBUMINURIA (HCC): ICD-10-CM

## 2018-01-26 DIAGNOSIS — B37.9 CANDIDIASIS: ICD-10-CM

## 2018-01-26 DIAGNOSIS — Z12.11 SCREEN FOR COLON CANCER: ICD-10-CM

## 2018-01-26 DIAGNOSIS — E78.00 PURE HYPERCHOLESTEROLEMIA: ICD-10-CM

## 2018-01-26 DIAGNOSIS — C91.10 CLL (CHRONIC LYMPHOCYTIC LEUKEMIA) (HCC): ICD-10-CM

## 2018-01-26 DIAGNOSIS — I10 HYPERTENSION, ESSENTIAL: ICD-10-CM

## 2018-01-26 DIAGNOSIS — G93.1 HYPOXIC BRAIN DAMAGE (HCC): ICD-10-CM

## 2018-01-26 DIAGNOSIS — Z13.39 SCREENING FOR ALCOHOLISM: ICD-10-CM

## 2018-01-26 DIAGNOSIS — R80.9 DIABETES MELLITUS WITH MICROALBUMINURIA (HCC): ICD-10-CM

## 2018-01-26 RX ORDER — NYSTATIN 100000 [USP'U]/G
POWDER TOPICAL 4 TIMES DAILY
Qty: 30 G | Refills: 0 | Status: SHIPPED | OUTPATIENT
Start: 2018-01-26 | End: 2020-08-31

## 2018-01-26 NOTE — PATIENT INSTRUCTIONS

## 2018-01-26 NOTE — PROGRESS NOTES
Anna Denton is a 59 y.o. male who was seen in clinic today (1/26/2018) for a full physical.      Assessment & Plan:   Diagnoses and all orders for this visit:    1. Medicare annual wellness visit, subsequent       -     Previous labs reviewed & discussed with him     2. ACP (advance care planning)    3. Screening for alcoholism  -     Annual  Alcohol Screen 15 min ()    4. Screen for colon cancer  -     OCCULT BLOOD, IMMUNOASSAY (FIT)    5. Special screening for malignant neoplasm of prostate  -     PSA SCREENING (SCREENING)  -     Digital Rectal Exam ()    6. Diabetes mellitus with microalbuminuria ([facility]- at goal, home glucose monitoring emphasized, long term diabetic complications discussed and continue current plan pending review of labs. -     METABOLIC PANEL, COMPREHENSIVE  -     CBC W/O DIFF  -     LIPID PANEL  -     HEMOGLOBIN A1C WITH EAG  -     MICROALBUMIN, UR, RAND W/ MICROALBUMIN/CREA RATIO    7. Hypoxic brain damage ([facility]- chronic, stable    8. Pure hypercholesterolemia- previously well controlled, continue current treatment pending review of labs   -     METABOLIC PANEL, COMPREHENSIVE  -     LIPID PANEL    9. Hypertension, essential- at goal, continue current treatment pending review of labs   -     METABOLIC PANEL, COMPREHENSIVE    10. CLL (chronic lymphocytic leukemia) ([facility]- stable, defer to specialist   -     CBC W/O DIFF    11. Lower urinary tract symptoms (LUTS)- new dx, reviewed life style changes, reviewed med options, will start w/ decreasing PO intake    12. Candidiasis- new dx, pt asymptomatic, will start on meds below  -     nystatin (MYCOSTATIN) powder; Apply  to affected area four (4) times daily. Follow-up Disposition:  Return in about 6 months (around 7/26/2018), or if symptoms worsen or fail to improve, for Regular follow up.        ------------------------------------------------------------------------------------------    Subjective:    Annual Wellness Visit- Subsequent Visit    End of Life Planning: This was discussed with him today and he has NO advanced directive  - add't info provided. Reviewed DNR/DNI and patient is not interested. Depression Screen:   PHQ over the last two weeks 1/26/2018   PHQ Not Done -   Little interest or pleasure in doing things Not at all   Feeling down, depressed or hopeless Not at all   Total Score PHQ 2 0       Fall Risk:   Fall Risk Assessment, last 12 mths 7/21/2017   Able to walk? Yes   Fall in past 12 months? No       Abuse Screen:  Abuse Screening Questionnaire 1/26/2018   Do you ever feel afraid of your partner? N   Are you in a relationship with someone who physically or mentally threatens you? N   Is it safe for you to go home? Y         Alcohol Risk Factor Screening: On any occasion during the past 3 months, have you had more than 4 drinks containing alcohol? No  Do you average more than 14 drinks per week? No    Hearing Loss: Hearing is good. Cognition Screen:  Has your family/caregiver stated any concerns about your memory: no  appropriate for age attention/concentration and appropriate safety awareness    Activities of Daily Living:    Requires assistance with: no ADLs  Home contains: no safety equipment. Exercise: very active    Adult Nutrition Screen:  No risk factors noted. Health Maintenance  Daily Aspirin: yes  AAA Screening: n/a (IPPE only)  Glaucoma Screening: UTD      Immunizations:     Influenza: up to date. Tetanus: up to date. Shingles: not up to date - reviewed Shingrix vaccine & will readdress at next visit. Pneumonia: up to date. Cancer screening:    Prostate: reviewed guidelines, will do today. Colon: guidelines reviewed, FIT testing ordered, will plan on c-scope at age 59.          Patient Care Team:  Lisa Silva MD as PCP - General (Internal Medicine)  Consuelo Vargas MD (Hematology and Oncology)  Junito Serrano MD as Physician (Ophthalmology)       The following sections were reviewed & updated as appropriate: PMH, PSH, FH, and SH. Patient Active Problem List   Diagnosis Code    Diabetes mellitus with microalbuminuria ([facility] E11.29, R80.9    Hypertension, essential I10    Hyperlipidemia E78.5    Hypoxic brain damage (HCC) G93.1    CLL (chronic lymphocytic leukemia) ([facility] C91.10    Hearing loss H91.90          Prior to Admission medications    Medication Sig Start Date End Date Taking? Authorizing Provider   JANUMET  mg per tablet TAKE 1 TAB BY MOUTH TWO (2) TIMES DAILY (WITH MEALS). 11/20/17  Yes Benedicto Clifton MD   trandolapril (MAVIK) 2 mg tablet TAKE 1 TAB BY MOUTH DAILY. 11/20/17  Yes Benedicto Clifton MD   lovastatin (MEVACOR) 40 mg tablet TAKE 1 TAB BY MOUTH DAILY. 11/20/17  Yes Benedicto Clifton MD   Lancets misc Test BS daily. DX:250.00 2/22/17  Yes Benedicto Clifton MD   glucose blood VI test strips (ASCENSIA AUTODISC VI, ONE TOUCH ULTRA TEST VI) strip Test BS daily. DX:250.00 2/22/17  Yes Benedicto Clifton MD   ibrutinib (IMBRUVICA) 140 mg capsule Take 420 mg by mouth. Yes Historical Provider   aspirin delayed-release 81 mg tablet Take 1 Tab by mouth daily. 8/16/16  Yes Benedicto Clifton MD   Blood-Glucose Meter monitoring kit Test BS daily. DX:250.00 7/28/15  Yes Benedicto Clifton MD   multivitamin (ONE A DAY) tablet Take 1 Tab by mouth daily. Yes Historical Provider   glipiZIDE (GLUCOTROL) 10 mg tablet TAKE 1 TAB BY MOUTH TWO (2) TIMES A DAY. 10/22/17   Benedicto Clifton MD   albuterol (PROVENTIL HFA, VENTOLIN HFA, PROAIR HFA) 90 mcg/actuation inhaler Take 1-2 Puffs by inhalation every six (6) hours as needed for Shortness of Breath (coughing). 7/21/17   Benedicto Clifton MD          Allergies   Allergen Reactions    Pcn [Penicillins] Anaphylaxis            *ROS is per brother in law*    Review of Systems   Constitutional: Negative for malaise/fatigue and weight loss. Respiratory: Negative for cough and shortness of breath. Cardiovascular: Negative for chest pain, palpitations and leg swelling. Gastrointestinal: Negative for abdominal pain, constipation, diarrhea, heartburn, nausea and vomiting. Genitourinary: Negative for frequency. He reports: nocturia x 2-3. He denies: urinary hesitancy, urinary frequency, weak stream.   Musculoskeletal: Negative for joint pain and myalgias. Skin: Negative for rash. Neurological: Negative for tingling, sensory change, focal weakness and headaches. Psychiatric/Behavioral: Negative for depression. The patient is not nervous/anxious and does not have insomnia. Objective:   Physical Exam   Constitutional: No distress. HENT:   Mouth/Throat: Mucous membranes are normal.   Eyes: Conjunctivae are normal. No scleral icterus. Neck: Neck supple. Cardiovascular: Regular rhythm and normal heart sounds. No murmur heard. Pulmonary/Chest: Effort normal and breath sounds normal. He has no wheezes. He has no rales. Abdominal: Soft. Bowel sounds are normal. He exhibits no mass. There is no hepatosplenomegaly. There is no tenderness. Genitourinary: Rectal exam shows no external hemorrhoid, no internal hemorrhoid, no mass, no tenderness and guaiac negative stool. Prostate is not enlarged and not tender. Musculoskeletal: He exhibits no edema. Lymphadenopathy:     He has no cervical adenopathy. Neurological:        Left Foot: Inspection: callous - location: medial aspect, 1st MTP. Pulse DP: 1+ (weak). Filament test: normal sensation with micro filament. Right Foot: callous - location: medial aspect, 1st MTP joint. Pulse DP: 1+ (weak). Filament test: normal sensation with micro filament. Skin: No rash noted. Gluteal cleft there is diffuse erythema, from tip to scrotal, not ttp, no odor, dry   Psychiatric: He has a normal mood and affect.  His behavior is normal.          Visit Vitals    /76    Pulse 92    Temp 97.8 °F (36.6 °C) (Oral)    Resp 18  Ht 5' 7\" (1.702 m)    Wt 189 lb (85.7 kg)    SpO2 98%    BMI 29.6 kg/m2          Advised him to call back or return to office if symptoms worsen/change/persist.  Discussed expected course/resolution/complications of diagnosis in detail with patient. Medication risks/benefits/costs/interactions/alternatives discussed with patient. He was given an after visit summary which includes diagnoses, current medications, & vitals. He expressed understanding with the diagnosis and plan. Aspects of this note may have been generated using voice recognition software. Despite editing, there may be some syntax errors.        Louise Gayle MD

## 2018-02-17 ENCOUNTER — HOSPITAL ENCOUNTER (OUTPATIENT)
Dept: LAB | Age: 58
Discharge: HOME | End: 2018-02-17
Payer: MEDICARE

## 2018-02-17 PROCEDURE — 36415 COLL VENOUS BLD VENIPUNCTURE: CPT

## 2018-02-17 PROCEDURE — 82043 UR ALBUMIN QUANTITATIVE: CPT

## 2018-02-17 PROCEDURE — 85027 COMPLETE CBC AUTOMATED: CPT

## 2018-02-17 PROCEDURE — 84153 ASSAY OF PSA TOTAL: CPT

## 2018-02-17 PROCEDURE — 83036 HEMOGLOBIN GLYCOSYLATED A1C: CPT

## 2018-02-17 PROCEDURE — 80053 COMPREHEN METABOLIC PANEL: CPT

## 2018-02-17 PROCEDURE — 80061 LIPID PANEL: CPT

## 2018-02-18 LAB
ALBUMIN SERPL-MCNC: 4.7 G/DL (ref 3.5–5.5)
ALBUMIN/CREAT UR: 55.4 MG/G CREAT (ref 0–30)
ALBUMIN/GLOB SERPL: 2.4 {RATIO} (ref 1.2–2.2)
ALP SERPL-CCNC: 102 IU/L (ref 39–117)
ALT SERPL-CCNC: 44 IU/L (ref 0–44)
AST SERPL-CCNC: 27 IU/L (ref 0–40)
BILIRUB SERPL-MCNC: 0.6 MG/DL (ref 0–1.2)
BUN SERPL-MCNC: 15 MG/DL (ref 6–24)
BUN/CREAT SERPL: 20 (ref 9–20)
CALCIUM SERPL-MCNC: 9.5 MG/DL (ref 8.7–10.2)
CHLORIDE SERPL-SCNC: 100 MMOL/L (ref 96–106)
CHOLEST SERPL-MCNC: 115 MG/DL (ref 100–199)
CO2 SERPL-SCNC: 25 MMOL/L (ref 18–29)
CREAT SERPL-MCNC: 0.76 MG/DL (ref 0.76–1.27)
CREAT UR-MCNC: 131.6 MG/DL
ERYTHROCYTE [DISTWIDTH] IN BLOOD BY AUTOMATED COUNT: 14.4 % (ref 12.3–15.4)
EST. AVERAGE GLUCOSE BLD GHB EST-MCNC: 140 MG/DL
GFR SERPLBLD CREATININE-BSD FMLA CKD-EPI: 100 ML/MIN/1.73
GFR SERPLBLD CREATININE-BSD FMLA CKD-EPI: 115 ML/MIN/1.73
GLOBULIN SER CALC-MCNC: 2 G/DL (ref 1.5–4.5)
GLUCOSE SERPL-MCNC: 81 MG/DL (ref 65–99)
HBA1C MFR BLD: 6.5 % (ref 4.8–5.6)
HCT VFR BLD AUTO: 45.3 % (ref 37.5–51)
HDLC SERPL-MCNC: 26 MG/DL
HGB BLD-MCNC: 14.7 G/DL (ref 13–17.7)
LDLC SERPL CALC-MCNC: 72 MG/DL (ref 0–99)
MCH RBC QN AUTO: 29.2 PG (ref 26.6–33)
MCHC RBC AUTO-ENTMCNC: 32.5 G/DL (ref 31.5–35.7)
MCV RBC AUTO: 90 FL (ref 79–97)
MICROALBUMIN UR-MCNC: 72.9 UG/ML
PLATELET # BLD AUTO: 172 X10E3/UL (ref 150–379)
POTASSIUM SERPL-SCNC: 4.4 MMOL/L (ref 3.5–5.2)
PROT SERPL-MCNC: 6.7 G/DL (ref 6–8.5)
PSA SERPL-MCNC: 0.6 NG/ML (ref 0–4)
RBC # BLD AUTO: 5.04 X10E6/UL (ref 4.14–5.8)
SODIUM SERPL-SCNC: 144 MMOL/L (ref 134–144)
TRIGL SERPL-MCNC: 83 MG/DL (ref 0–149)
VLDLC SERPL CALC-MCNC: 17 MG/DL (ref 5–40)
WBC # BLD AUTO: 11.2 X10E3/UL (ref 3.4–10.8)

## 2018-02-19 NOTE — PROGRESS NOTES
Results released to patient via [employer]. All labs are stable or at goal for him. PSA increase as expected. No changes.

## 2018-05-07 RX ORDER — GLIPIZIDE 10 MG/1
TABLET ORAL
Qty: 180 TAB | Refills: 1 | Status: SHIPPED | OUTPATIENT
Start: 2018-05-07 | End: 2018-11-04 | Stop reason: SDUPTHER

## 2018-05-07 RX ORDER — TRANDOLAPRIL 2 MG/1
TABLET ORAL
Qty: 90 TAB | Refills: 1 | Status: SHIPPED | OUTPATIENT
Start: 2018-05-07 | End: 2018-11-02 | Stop reason: SDUPTHER

## 2018-05-18 RX ORDER — SITAGLIPTIN AND METFORMIN HYDROCHLORIDE 500; 50 MG/1; MG/1
TABLET, FILM COATED ORAL
Qty: 180 TAB | Refills: 1 | Status: SHIPPED | OUTPATIENT
Start: 2018-05-18 | End: 2018-11-17 | Stop reason: SDUPTHER

## 2018-05-22 RX ORDER — LANCETS 30 GAUGE
EACH MISCELLANEOUS
Qty: 100 LANCET | Refills: 1 | Status: SHIPPED | OUTPATIENT
Start: 2018-05-22 | End: 2018-05-29 | Stop reason: SDUPTHER

## 2018-05-24 RX ORDER — LOVASTATIN 40 MG/1
TABLET ORAL
Qty: 90 TAB | Refills: 1 | Status: SHIPPED | OUTPATIENT
Start: 2018-05-24 | End: 2018-11-17 | Stop reason: SDUPTHER

## 2018-05-29 DIAGNOSIS — E11.29 DIABETES MELLITUS WITH MICROALBUMINURIA (HCC): Primary | ICD-10-CM

## 2018-05-29 DIAGNOSIS — R80.9 DIABETES MELLITUS WITH MICROALBUMINURIA (HCC): Primary | ICD-10-CM

## 2018-05-29 RX ORDER — BLOOD-GLUCOSE CONTROL, NORMAL
EACH MISCELLANEOUS
Qty: 100 LANCET | Refills: 1 | Status: SHIPPED | OUTPATIENT
Start: 2018-05-29 | End: 2018-07-27 | Stop reason: SDUPTHER

## 2018-07-27 ENCOUNTER — HOSPITAL ENCOUNTER (OUTPATIENT)
Dept: LAB | Age: 58
Discharge: HOME | End: 2018-07-27
Payer: MEDICARE

## 2018-07-27 ENCOUNTER — OFFICE VISIT (OUTPATIENT)
Dept: INTERNAL MEDICINE CLINIC | Age: 58
End: 2018-07-27

## 2018-07-27 VITALS
HEART RATE: 96 BPM | SYSTOLIC BLOOD PRESSURE: 122 MMHG | HEIGHT: 67 IN | OXYGEN SATURATION: 98 % | TEMPERATURE: 97.5 F | RESPIRATION RATE: 20 BRPM | WEIGHT: 186 LBS | BODY MASS INDEX: 29.19 KG/M2 | DIASTOLIC BLOOD PRESSURE: 68 MMHG

## 2018-07-27 DIAGNOSIS — E66.3 OVERWEIGHT (BMI 25.0-29.9): ICD-10-CM

## 2018-07-27 DIAGNOSIS — R80.9 DIABETES MELLITUS WITH MICROALBUMINURIA (HCC): Primary | ICD-10-CM

## 2018-07-27 DIAGNOSIS — I10 HYPERTENSION, ESSENTIAL: ICD-10-CM

## 2018-07-27 DIAGNOSIS — S60.00XA TRAUMATIC ECCHYMOSIS OF FINGER, INITIAL ENCOUNTER: ICD-10-CM

## 2018-07-27 DIAGNOSIS — E78.00 PURE HYPERCHOLESTEROLEMIA: ICD-10-CM
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
## 2018-07-27 DIAGNOSIS — E11.29 DIABETES MELLITUS WITH MICROALBUMINURIA (HCC): Primary | ICD-10-CM

## 2018-07-27 PROCEDURE — 80053 COMPREHEN METABOLIC PANEL: CPT

## 2018-07-27 PROCEDURE — 36415 COLL VENOUS BLD VENIPUNCTURE: CPT

## 2018-07-27 PROCEDURE — 83036 HEMOGLOBIN GLYCOSYLATED A1C: CPT

## 2018-07-27 NOTE — PROGRESS NOTES
Follow up. Middle finger, right hand redness, at nail base. Had questionable bites bilateral inner thigh.

## 2018-07-27 NOTE — PATIENT INSTRUCTIONS
Paronychia: Care Instructions Your Care Instructions Paronychia (say \"slrc-cu-MV-timothy-uh\") is an infection of the skin around a fingernail or toenail. It happens when germs enter through a break in the skin. The doctor may have made a small cut in the infected area to drain the pus. Most cases of paronychia improve in a few days. But watch your symptoms and follow your doctor's advice. Though rare, a mild case can turn into something more serious and infect your entire finger or toe. Also, it is possible for an infection to return. Follow-up care is a key part of your treatment and safety. Be sure to make and go to all appointments, and call your doctor if you are having problems. It's also a good idea to know your test results and keep a list of the medicines you take. How can you care for yourself at home? · If your doctor told you how to care for your infected nail, follow the doctor's instructions. If you did not get instructions, follow this general advice: ¨ Wash the area with clean water 2 times a day. Don't use hydrogen peroxide or alcohol, which can slow healing. ¨ You may cover the area with a thin layer of petroleum jelly, such as Vaseline, and a nonstick bandage. ¨ Apply more petroleum jelly and replace the bandage as needed. · If your doctor prescribed antibiotics, take them as directed. Do not stop taking them just because you feel better. You need to take the full course of antibiotics. · Take an over-the-counter pain medicine, such as acetaminophen (Tylenol), ibuprofen (Advil, Motrin), or naproxen (Aleve). Read and follow all instructions on the label. · Do not take two or more pain medicines at the same time unless the doctor told you to. Many pain medicines have acetaminophen, which is Tylenol. Too much acetaminophen (Tylenol) can be harmful. · Prop up the toe or finger so that it is higher than the level of your heart. This will help with pain and swelling. · Apply heat.  Put a warm water bottle, heating pad set on low, or warm cloth on your finger or toe. Do not go to sleep with a heating pad on your skin. · Soak the area in warm water twice a day for 15 minutes each time. After soaking, dry the area well and apply a thin layer of petroleum jelly, such as Vaseline. Put on a new bandage. When should you call for help? Call your doctor now or seek immediate medical care if: 
  · You have signs of new or worsening infection, such as: 
¨ Increased pain, swelling, warmth, or redness. ¨ Red streaks leading from the infected skin. ¨ Pus draining from the area. ¨ A fever.  
 Watch closely for changes in your health, and be sure to contact your doctor if: 
  · You do not get better as expected. Where can you learn more? Go to http://lisa-girish.info/. Enter C435 in the search box to learn more about \"Paronychia: Care Instructions. \" Current as of: October 5, 2017 Content Version: 11.7 © 2306-0644 [employer]. Care instructions adapted under license by [employer] (which disclaims liability or warranty for this information). If you have questions about a medical condition or this instruction, always ask your healthcare professional. [address] any warranty or liability for your use of this information.

## 2018-07-27 NOTE — PROGRESS NOTES
Ani Cronin is a 59 y.o. male who was seen in clinic today (7/27/2018). He RTC w/ his brother in law. He is excited a trip to see the [employer] [place] show. Assessment & Plan:  
Diagnoses and all orders for this visit: 1. Diabetes mellitus with microalbuminuria ([facility] [address]- well controlled, long term diabetic complications discussed, reviewed following up with specialists and/or referrals placed below and continue current plan pending review of labs -     METABOLIC PANEL, COMPREHENSIVE 
-     HEMOGLOBIN A1C WITH [facility] 
-      DIABETES FOOT EXAM 
 
2. Hypertension, essential- at goal, continue current treatment pending review of labs -     METABOLIC PANEL, COMPREHENSIVE 3. Pure hypercholesterolemia- at goal, continue current treatment pending review of labs -     METABOLIC PANEL, COMPREHENSIVE 4. Overweight (BMI 25.0-29. 9)- stable, needs improvement, I have reviewed/discussed the above normal BMI with the patient. I have recommended the following interventions: encourage exercise and lifestyle education regarding diet, sounds like his diet is the issue (still a lot of snacking). 5. Traumatic ecchymosis of finger, initial encounter- new dx, does not look like infection (paronychia), will ice & monitor, reviewed red flags, and if it develops they will call to notify me. Due to PCN allergy will need doxycycline. 6. Insect bite, initial encounter- new dx, no signs of infection, will use OTC steroid cream, Red flags and expectations were reviewed & discussed with the him. He verbalized understanding. Follow-up Disposition: 
Return in about 6 months (around 1/27/2019) for FULL PHYSICAL - 30 minutes. Subjective:  
Romeo Hatchet was seen today for Diabetes; Hypertension; and Cholesterol Problem Endocrine Review He is seen for diabetes. Since last visit he reports: no significant changes. Home glucose monitoring: is not performed.   He reports medication compliance: compliant all of the time. Medication side effects: none. Diabetic diet compliance: compliant most of the time. Lab review: labs reviewed and discussed with patient. Cardiovascular Review The patient has hypertension and hyperlipidemia. Since last visit: no changes. He reports taking medications as instructed, no medication side effects noted, patient does not perform home BP monitoring. Diet and Lifestyle: generally follows a low fat low cholesterol diet, generally follows a low sodium diet, exercises regularly. Labs: reviewed and discussed with patient. Brief Labs:  
 
Lab Results Component Value Date/Time Sodium 144 02/17/2018 09:27 AM  
 Potassium 4.4 02/17/2018 09:27 AM  
 Creatinine 0.76 02/17/2018 09:27 AM  
 Cholesterol, total 115 02/17/2018 09:27 AM  
 HDL Cholesterol 26 02/17/2018 09:27 AM  
 LDL, calculated 72 02/17/2018 09:27 AM  
 Triglyceride 83 02/17/2018 09:27 AM  
 Hemoglobin A1c 6.5 02/17/2018 09:27 AM  
  
 
 
Prior to Admission medications Medication Sig Start Date End Date Taking? Authorizing Provider  
ibrutinib (IMBRUVICA) 420 mg tablet Take  by mouth daily. Yes Historical Provider  
glucose blood VI test strips (ONETOUCH ULTRA BLUE TEST STRIP) strip TEST DAILY. Dx:  E11.29 5/29/18  Yes Briana De Los Santos MD  
lovastatin (MEVACOR) 40 mg tablet TAKE 1 TAB BY MOUTH DAILY. 5/24/18  Yes Briana De Los Santos MD  
JANUMET  mg per tablet TAKE 1 TAB BY MOUTH TWO (2) TIMES DAILY (WITH MEALS). 5/18/18  Yes Briana De Los Santos MD  
trandolapril (MAVIK) 2 mg tablet TAKE 1 TAB BY MOUTH DAILY. 5/7/18  Yes Briana De Los Santos MD  
glipiZIDE (GLUCOTROL) 10 mg tablet TAKE 1 TAB BY MOUTH TWO (2) TIMES A DAY. 5/7/18  Yes Briana De Los Santos MD  
nystatin (MYCOSTATIN) powder Apply  to affected area four (4) times daily.  1/26/18  Yes Briana De Los Santos MD  
albuterol (PROVENTIL HFA, VENTOLIN HFA, PROAIR HFA) 90 mcg/actuation inhaler Take 1-2 Puffs by inhalation every six (6) hours as needed for Shortness of Breath (coughing). 7/21/17  Yes Holger Arce MD  
Lancets misc Test BS daily. DX:250.00 2/22/17  Yes Holger Arce MD  
aspirin delayed-release 81 mg tablet Take 1 Tab by mouth daily. 8/16/16  Yes Holger Arce MD  
Blood-Glucose Meter monitoring kit Test BS daily. DX:250.00 7/28/15  Yes Holger Arce MD  
multivitamin (ONE A DAY) tablet Take 1 Tab by mouth daily. Yes Historical Provider Allergies Allergen Reactions  Pcn [Penicillins] Anaphylaxis Review of Systems Constitutional: Negative for malaise/fatigue and weight loss. Respiratory: Negative for cough and shortness of breath. Cardiovascular: Negative for chest pain, palpitations and leg swelling. Gastrointestinal: Negative for abdominal pain, constipation, diarrhea, heartburn, nausea and vomiting. Genitourinary: Negative for frequency. Musculoskeletal: Negative for joint pain and myalgias. Skin: Negative for rash. R 3rd finger, a few days, it is red/purple, not painful, chewing his nails, using OTC abx cream  
Neurological: Negative for tingling, sensory change, focal weakness and headaches. Psychiatric/Behavioral: Negative for depression. The patient is not nervous/anxious and does not have insomnia. Objective:  
Physical Exam  
Constitutional: No distress. HENT:  
Mouth/Throat: Mucous membranes are normal.  
Eyes: Conjunctivae are normal. No scleral icterus. Neck: Neck supple. Cardiovascular: Regular rhythm and normal heart sounds. No murmur heard. Pulmonary/Chest: Effort normal and breath sounds normal. He has no wheezes. He has no rales. Abdominal: Soft. Bowel sounds are normal. He exhibits no mass. There is no hepatosplenomegaly. There is no tenderness. Musculoskeletal: He exhibits no edema.   
R 3rd finger: base of the nail there is some purplish/red discoloration, there is fluid underneath, it is not painful or fluctuant Lymphadenopathy:  
  He has no cervical adenopathy. Neurological:  
Left Foot: 
 Visual Exam: toe nails need trimming Pulse DP: 1+ (weak) Filament test: normal sensation Right Foot: 
 Visual Exam: toe nails need trimming Pulse DP: 1+ (weak) Filament test: normal sensation Skin: No rash noted. Inner R thigh there are 2 erythematous nodules, 7mm, not tender, no pustules, and 2 scabbed over smaller lesions, no rash Psychiatric: He has a normal mood and affect. His behavior is normal.  
 
 
 
Visit Vitals  /68  Pulse 96  Temp 97.5 °F (36.4 °C) (Oral)  Resp 20  
 Ht 5' 7\" (1.702 m)  Wt 186 lb (84.4 kg)  SpO2 98%  BMI 29.13 kg/m2 Disclaimer: 
Advised him to call back or return to office if symptoms worsen/change/persist. 
Discussed expected course/resolution/complications of diagnosis in detail with patient. Medication risks/benefits/costs/interactions/alternatives discussed with patient. He was given an after visit summary which includes diagnoses, current medications, & vitals. He expressed understanding with the diagnosis and plan. Aspects of this note may have been generated using voice recognition software. Despite editing, there may be some syntax errors.   
 
 
Jessica Grewal MD

## 2018-07-28 LAB
ALBUMIN SERPL-MCNC: 4 G/DL (ref 3.6–4.8)
ALBUMIN/GLOB SERPL: 1.7 {RATIO} (ref 1.2–2.2)
ALP SERPL-CCNC: 89 IU/L (ref 39–117)
ALT SERPL-CCNC: 25 IU/L (ref 0–44)
AST SERPL-CCNC: 21 IU/L (ref 0–40)
BILIRUB SERPL-MCNC: 0.5 MG/DL (ref 0–1.2)
BUN SERPL-MCNC: 19 MG/DL (ref 8–27)
BUN/CREAT SERPL: 21 (ref 10–24)
CALCIUM SERPL-MCNC: 9.7 MG/DL (ref 8.6–10.2)
CHLORIDE SERPL-SCNC: 99 MMOL/L (ref 96–106)
CO2 SERPL-SCNC: 26 MMOL/L (ref 20–29)
CREAT SERPL-MCNC: 0.92 MG/DL (ref 0.76–1.27)
EST. AVERAGE GLUCOSE BLD GHB EST-MCNC: 123 MG/DL
GLOBULIN SER CALC-MCNC: 2.3 G/DL (ref 1.5–4.5)
GLUCOSE SERPL-MCNC: 205 MG/DL (ref 65–99)
HBA1C MFR BLD: 5.9 % (ref 4.8–5.6)
POTASSIUM SERPL-SCNC: 4.8 MMOL/L (ref 3.5–5.2)
PROT SERPL-MCNC: 6.3 G/DL (ref 6–8.5)
SODIUM SERPL-SCNC: 140 MMOL/L (ref 134–144)

## 2018-07-30 NOTE — PROGRESS NOTES
Results released to patient via [employer]. All labs are stable or at goal for him. A1c improved, will continue w/ current dose of MARCANO for now, will decrease if low again.

## 2018-10-02 ENCOUNTER — OFFICE VISIT (OUTPATIENT)
Dept: INTERNAL MEDICINE CLINIC | Age: 58
End: 2018-10-02

## 2018-10-02 ENCOUNTER — TELEPHONE (OUTPATIENT)
Dept: INTERNAL MEDICINE CLINIC | Age: 58
End: 2018-10-02

## 2018-10-02 VITALS
RESPIRATION RATE: 18 BRPM | HEART RATE: 78 BPM | HEIGHT: 67 IN | BODY MASS INDEX: 28.41 KG/M2 | SYSTOLIC BLOOD PRESSURE: 136 MMHG | OXYGEN SATURATION: 96 % | WEIGHT: 181 LBS | TEMPERATURE: 97.9 F | DIASTOLIC BLOOD PRESSURE: 76 MMHG

## 2018-10-02 DIAGNOSIS — L30.9 DERMATITIS: Primary | ICD-10-CM

## 2018-10-02 NOTE — PROGRESS NOTES
Cameron Rajput is a 59 y.o. male who was seen in clinic today (10/2/2018). Assessment & Plan:   Diagnoses and all orders for this visit:    1. Dermatitis- this is a chronic problem but new to me, symptoms are: worsened, differential dx reviewed with the patient, and is unclear at this time. Would favor allergic reaction. Reassured it is not shingles. Since dermatology has already evaluated, checked labs, and done biopsy will get these records and talk to specialist.  Reviewed can use Benadryl at night and Allegra/Claritin/Zyrtec during the day for itching. Would start w/ NSAIDs for discomfort, but if gets worse need to use tramadol/narcotics so will defer for now. Red flags were reviewed with the patient to RTC or notify me. **I spoke to dermatologist (Dr Eduar Cheek). We reviewed his exam today and her initial thoughts from their visit on 9/6. Initially thought to be allergic or diffuse HSV. Viral cx negative. Biopsy showed eosinophilia thought to be allergic or bullous pemphigoid. Since not getting any better she will have her office contact patient to return to clinic for repeat bx w/ special stains. If treatment requires prednisone can restart as his DM is well controlled, but will need to keep a close eye on it. Follow-up Disposition: Not on File        Subjective:   Mehran Barrios was seen today for Rash    Dermatology Review  He is here to talk about rash. He RTC with his niece. He noticed it 4 weeks ago, with some subtle changes but no real improvement. Location: whole body. Started on both legs, but has since spread. Symptoms include pain and redness. No new medications, no new foods, or travel. He has seen hematologist and dermatologist.  Treatment to date has included prednisone and topical steroid, w/o any real effect. They reports hematologist stopped chemo x 1 week w/o any relief.          Brief Labs:     Lab Results   Component Value Date/Time    Sodium 140 07/27/2018 02:59 PM Potassium 4.8 07/27/2018 02:59 PM    Creatinine 0.92 07/27/2018 02:59 PM    Cholesterol, total 115 02/17/2018 09:27 AM    HDL Cholesterol 26 02/17/2018 09:27 AM    LDL, calculated 72 02/17/2018 09:27 AM    Triglyceride 83 02/17/2018 09:27 AM    Hemoglobin A1c 5.9 07/27/2018 02:59 PM          Prior to Admission medications    Medication Sig Start Date End Date Taking? Authorizing Provider   ibrutinib (IMBRUVICA) 420 mg tablet Take  by mouth daily. Yes Historical Provider   glucose blood VI test strips (ONETOUCH ULTRA BLUE TEST STRIP) strip TEST DAILY. Dx:  E11.29 5/29/18  Yes Mehran Tsai MD   lovastatin (MEVACOR) 40 mg tablet TAKE 1 TAB BY MOUTH DAILY. 5/24/18  Yes Mehran Tsai MD   JANUMET  mg per tablet TAKE 1 TAB BY MOUTH TWO (2) TIMES DAILY (WITH MEALS). 5/18/18  Yes Mehran Tsai MD   trandolapril (MAVIK) 2 mg tablet TAKE 1 TAB BY MOUTH DAILY. 5/7/18  Yes Mehran Tsai MD   glipiZIDE (GLUCOTROL) 10 mg tablet TAKE 1 TAB BY MOUTH TWO (2) TIMES A DAY. 5/7/18  Yes Mehran Tsai MD   nystatin (MYCOSTATIN) powder Apply  to affected area four (4) times daily. 1/26/18  Yes Mehran Tsai MD   albuterol (PROVENTIL HFA, VENTOLIN HFA, PROAIR HFA) 90 mcg/actuation inhaler Take 1-2 Puffs by inhalation every six (6) hours as needed for Shortness of Breath (coughing). 7/21/17  Yes Mehran Tsai MD   Lancets misc Test BS daily. DX:250.00 2/22/17  Yes Mehran Tsai MD   aspirin delayed-release 81 mg tablet Take 1 Tab by mouth daily. 8/16/16  Yes Mehran Tsai MD   Blood-Glucose Meter monitoring kit Test BS daily. DX:250.00 7/28/15  Yes Mehran Tsai MD   multivitamin (ONE A DAY) tablet Take 1 Tab by mouth daily. Yes Historical Provider          Allergies   Allergen Reactions    Pcn [Penicillins] Anaphylaxis           Review of Systems   Respiratory: Negative for cough and shortness of breath.     Cardiovascular: Negative for chest pain and palpitations. Gastrointestinal: Negative for abdominal pain, constipation, diarrhea, nausea and vomiting. Musculoskeletal: Negative for joint pain and neck pain. Skin: Positive for itching and rash. Objective:   Physical Exam   Constitutional: No distress. Cardiovascular: Regular rhythm and normal heart sounds. No murmur heard. Pulmonary/Chest: Effort normal and breath sounds normal. He has no wheezes. He has no rales. Musculoskeletal: He exhibits edema (1+ in both ankles). Skin:   Diffuse erythematous patches with scabs present throughout most of the body (bilateral legs - mostly medial and posterior aspect), whole trunk, and on his face. There are no ulcers, bullae, or vesicles               Visit Vitals    /76    Pulse 78    Temp 97.9 °F (36.6 °C) (Oral)    Resp 18    Ht 5' 7\" (1.702 m)    Wt 181 lb (82.1 kg)    SpO2 96%    BMI 28.35 kg/m2         Disclaimer:  Advised him to call back or return to office if symptoms worsen/change/persist.  Discussed expected course/resolution/complications of diagnosis in detail with patient. Medication risks/benefits/costs/interactions/alternatives discussed with patient. He was given an after visit summary which includes diagnoses, current medications, & vitals. He expressed understanding with the diagnosis and plan. Aspects of this note may have been generated using voice recognition software. Despite editing, there may be some syntax errors.        Eliz Ashraf MD

## 2018-11-02 RX ORDER — TRANDOLAPRIL 2 MG/1
TABLET ORAL
Qty: 90 TAB | Refills: 1 | Status: SHIPPED | OUTPATIENT
Start: 2018-11-02 | End: 2019-04-27 | Stop reason: SDUPTHER

## 2018-11-04 RX ORDER — GLIPIZIDE 10 MG/1
TABLET ORAL
Qty: 180 TAB | Refills: 1 | Status: SHIPPED | OUTPATIENT
Start: 2018-11-04 | End: 2019-04-27 | Stop reason: SDUPTHER

## 2018-11-18 RX ORDER — SITAGLIPTIN AND METFORMIN HYDROCHLORIDE 500; 50 MG/1; MG/1
TABLET, FILM COATED ORAL
Qty: 180 TAB | Refills: 1 | Status: SHIPPED | OUTPATIENT
Start: 2018-11-18 | End: 2019-05-11 | Stop reason: SDUPTHER

## 2018-11-18 RX ORDER — LOVASTATIN 40 MG/1
TABLET ORAL
Qty: 90 TAB | Refills: 1 | Status: SHIPPED | OUTPATIENT
Start: 2018-11-18 | End: 2019-05-11 | Stop reason: SDUPTHER

## 2019-04-28 RX ORDER — TRANDOLAPRIL 2 MG/1
TABLET ORAL
Qty: 90 TAB | Refills: 1 | Status: SHIPPED | OUTPATIENT
Start: 2019-04-28 | End: 2019-10-24 | Stop reason: SDUPTHER

## 2019-04-28 RX ORDER — GLIPIZIDE 10 MG/1
TABLET ORAL
Qty: 180 TAB | Refills: 1 | Status: SHIPPED | OUTPATIENT
Start: 2019-04-28 | End: 2019-05-05 | Stop reason: SDUPTHER

## 2019-05-01 ENCOUNTER — OFFICE VISIT (OUTPATIENT)
Dept: INTERNAL MEDICINE CLINIC | Age: 59
End: 2019-05-01

## 2019-05-01 VITALS
WEIGHT: 176 LBS | DIASTOLIC BLOOD PRESSURE: 62 MMHG | SYSTOLIC BLOOD PRESSURE: 112 MMHG | OXYGEN SATURATION: 99 % | RESPIRATION RATE: 20 BRPM | HEART RATE: 80 BPM | HEIGHT: 67 IN | BODY MASS INDEX: 27.62 KG/M2 | TEMPERATURE: 97.8 F

## 2019-05-01 DIAGNOSIS — I10 HYPERTENSION, ESSENTIAL: ICD-10-CM

## 2019-05-01 DIAGNOSIS — E11.29 DIABETES MELLITUS WITH MICROALBUMINURIA (HCC): ICD-10-CM

## 2019-05-01 DIAGNOSIS — Z13.31 SCREENING FOR DEPRESSION: ICD-10-CM

## 2019-05-01 DIAGNOSIS — G93.1 HYPOXIC BRAIN DAMAGE (HCC): ICD-10-CM

## 2019-05-01 DIAGNOSIS — Z00.00 MEDICARE ANNUAL WELLNESS VISIT, SUBSEQUENT: Primary | ICD-10-CM

## 2019-05-01 DIAGNOSIS — C91.10 CLL (CHRONIC LYMPHOCYTIC LEUKEMIA) (HCC): ICD-10-CM

## 2019-05-01 DIAGNOSIS — Z13.39 SCREENING FOR ALCOHOLISM: ICD-10-CM

## 2019-05-01 DIAGNOSIS — E66.3 OVERWEIGHT (BMI 25.0-29.9): ICD-10-CM

## 2019-05-01 DIAGNOSIS — Z12.11 COLON CANCER SCREENING: ICD-10-CM

## 2019-05-01 DIAGNOSIS — Z71.89 ADVANCED CARE PLANNING/COUNSELING DISCUSSION: ICD-10-CM

## 2019-05-01 DIAGNOSIS — Z23 ENCOUNTER FOR IMMUNIZATION: ICD-10-CM

## 2019-05-01 DIAGNOSIS — R80.9 DIABETES MELLITUS WITH MICROALBUMINURIA (HCC): ICD-10-CM

## 2019-05-01 DIAGNOSIS — E78.00 PURE HYPERCHOLESTEROLEMIA: ICD-10-CM

## 2019-05-01 NOTE — ACP (ADVANCE CARE PLANNING)
Advance Care Planning (ACP) Provider Note - Comprehensive     Date of ACP Conversation: 05/01/19  Persons included in Conversation:  patient and family  Length of ACP Conversation in minutes: <16 minutes (Non-Billable)    Authorized Decision Maker (if patient is incapable of making informed decisions): Other Legally Authorized Decision Maker (e.g. Next of Kin)      He has NO advanced directive  - add't info provided. Reviewed DNR/DNI and patient is not capable of making this decision. General ACP for ALL Patients with Decision Making Capacity:  Importance of advance care planning, including choosing a healthcare agent to communicate patient's healthcare decisions if patient lost the ability to make decisions, such as after a sudden illness or accident  Understanding of the healthcare agent role was assessed and information provided  Opportunity offered to explore how cultural, Mormonism, spiritual, or personal beliefs would affect decisions for future care  Exploration of values, goals, and preferences if recovery is not expected, even with continued medical treatment in the event of: Imminent death or severe, permanent brain injury    For Serious or Chronic Illness:  Understanding of CPR, goals and expected outcomes, benefits and burdens discussed.   Understanding of medical condition  Information on CPR success rates provided (e.g. for CPR in hospital, survival to d/c at two weeks is 22%, for chronically ill or elderly/frail survival is less than 3%)    Interventions Provided:  Recommended communicating the plan and making copies for the healthcare agent, personal physician, and others as appropriate (e.g., health system)  Recommended review of completed ACP document annually or upon change in health status

## 2019-05-01 NOTE — PATIENT INSTRUCTIONS
Medicare Wellness Visit, Male The best way to live healthy is to have a lifestyle where you eat a well-balanced diet, exercise regularly, limit alcohol use, and quit all forms of tobacco/nicotine, if applicable. Regular preventive services are another way to keep healthy. Preventive services (vaccines, screening tests, monitoring & exams) can help personalize your care plan, which helps you manage your own care. Screening tests can find health problems at the earliest stages, when they are easiest to treat. [facility] follows the current, evidence-based guidelines published by the [facility] ([facility]) when recommending preventive services for our patients. Because we follow these guidelines, sometimes recommendations change over time as research supports it. (For example, a prostate screening blood test is no longer routinely recommended for men with no symptoms.) Of course, you and your doctor may decide to screen more often for some diseases, based on your risk and co-morbidities (chronic disease you are already diagnosed with). Preventive services for you include: - Medicare offers their members a free annual wellness visit, which is time for you and your primary care provider to discuss and plan for your preventive service needs. Take advantage of this benefit every year! 
-All adults over age 72 should receive the recommended pneumonia vaccines. Current USPSTF guidelines recommend a series of two vaccines for the best pneumonia protection.  
-All adults should have a flu vaccine yearly and an ECG.  All adults age 61 and older should receive a shingles vaccine once in their lifetime.   
-All adults age 38-68 who are overweight should have a diabetes screening test once every three years.  
-Other screening tests & preventive services for persons with diabetes include: an eye exam to screen for diabetic retinopathy, a kidney function test, a foot exam, and stricter control over your cholesterol.  
-Cardiovascular screening for adults with routine risk involves an electrocardiogram (ECG) at intervals determined by the provider.  
-Colorectal cancer screening should be done for adults age 54-65 with no increased risk factors for colorectal cancer. There are a number of acceptable methods of screening for this type of cancer. Each test has its own benefits and drawbacks. Discuss with your provider what is most appropriate for you during your annual wellness visit. The different tests include: colonoscopy (considered the best screening method), a fecal occult blood test, a fecal DNA test, and sigmoidoscopy. 
-All adults born between [facility] should be screened once for Hepatitis C. 
-An Abdominal Aortic Aneurysm (AAA) Screening is recommended for men age 73-68 who has ever smoked in their lifetime. Here is a list of your current Health Maintenance items (your personalized list of preventive services) with a due date: 
Health Maintenance Due Topic Date Due  Shingles Vaccine (1 of 2) 03/20/2008  Stool testing for trace blood  03/20/2008 [facility] Annual Well Visit  01/27/2019  Hemoglobin A1C    01/27/2019  Albumin Urine Test  02/17/2019  Cholesterol Test   02/17/2019 [address]iz [address] What is a living will? A living will is a legal form you use to write down the kind of care you want at the end of your life. It is used by the health professionals who will treat you if you aren't able to decide for yourself. If you put your wishes in writing, your loved ones and others will know what kind of care you want. They won't need to guess. This can ease your mind and be helpful to others. A living will is not the same as an estate or property will. An estate will explains what you want to happen with your money and property after you die. Is a living will a legal document? A living will is a legal document. Each state has its own laws about living egan. If you move to another state, make sure that your living will is legal in the state where you now live. Or you might use a universal form that has been approved by many states. This kind of form can sometimes be completed and stored online. Your electronic copy will then be available wherever you have a connection to the Internet. In most cases, doctors will respect your wishes even if you have a form from a different state. · You don't need an  to complete a living will. But legal advice can be helpful if your state's laws are unclear, your health history is complicated, or your family can't agree on what should be in your living will. · You can change your living will at any time. Some people find that their wishes about end-of-life care change as their health changes. · In addition to making a living will, think about completing a medical power of  form. This form lets you name the person you want to make end-of-life treatment decisions for you (your \"health care agent\") if you're not able to. Many hospitals and nursing homes will give you the forms you need to complete a living will and a medical power of . · Your living will is used only if you can't make or communicate decisions for yourself anymore. If you become able to make decisions again, you can accept or refuse any treatment, no matter what you wrote in your living will. · Your state may offer an online registry. This is a place where you can store your living will online so the doctors and nurses who need to treat you can find it right away. What should you think about when creating a living will? Talk about your end-of-life wishes with your family members and your doctor. Let them know what you want. That way the people making decisions for you won't be surprised by your choices. Think about these questions as you make your living will: · Do you know enough about life support methods that might be used? If not, talk to your doctor so you know what might be done if you can't breathe on your own, your heart stops, or you're unable to swallow. · What things would you still want to be able to do after you receive life-support methods? Would you want to be able to walk? To speak? To eat on your own? To live without the help of machines? · If you have a choice, where do you want to be cared for? In your home? At a hospital or nursing home? · Do you want certain Denominational practices performed if you become very ill? · If you have a choice at the end of your life, where would you prefer to die? At home? In a hospital or nursing home? Somewhere else? · Would you prefer to be buried or cremated? · Do you want your organs to be donated after you die? What should you do with your living will? · Make sure that your family members and your health care agent have copies of your living will. · Give your doctor a copy of your living will to keep in your medical record. If you have more than one doctor, make sure that each one has a copy. · You may want to put a copy of your living will where it can be easily found. Where can you learn more? Go to http://lisa-girish.info/. Enter R809 in the search box to learn more about \"Learning About Living Ana. \" Current as of: August 8, 2016 Content Version: 11.3 © 3295-3109 [employer]. Care instructions adapted under license by [employer] (which disclaims liability or warranty for this information). If you have questions about a medical condition or this instruction, always ask your healthcare professional. [address] any warranty or liability for your use of this information.

## 2019-05-01 NOTE — PROGRESS NOTES
An Hoyt is a 62 y.o. male who was seen in clinic today (5/1/2019) for a full physical.     
 
Assessment & Plan:  
Diagnoses and all orders for this visit: 
 
1. Medicare annual wellness visit, subsequent 2. Advanced care planning/counseling discussion 3. Encounter for immunization 
-     varicella-zoster recombinant, PF, (SHINGRIX, PF,) 50 mcg/0.5 mL susr injection; 0.5 ml IM once and then repeat in 2-6 months. 4. Screening for alcoholism -     [place] ANNUAL ALCOHOL SCREEN 15 MIN 
 
5. [facility] depression 
[facility]     [facility] 6. Colon cancer screening 
-     REFERRAL TO GASTROENTEROLOGY 7. Diabetes mellitus with microalbuminuria ([facility]- well controlled, long term diabetic complications discussed, reviewed following up with specialists and/or referrals placed below and continue current plan pending review of labs. -     METABOLIC PANEL, COMPREHENSIVE 
-     CBC W/O DIFF 
-     HEMOGLOBIN A1C WITH EAG 
-     LIPID PANEL 
-     MICROALBUMIN, UR, RAND W/ MICROALB/CREAT RATIO 
-      DIABETES FOOT EXAM 
 
8. Hypertension, essential- at goal, continue current treatment pending review of labs -     METABOLIC PANEL, COMPREHENSIVE 
-     CBC W/O DIFF 9. Pure hypercholesterolemia- well controlled, continue current treatment pending review of labs -     METABOLIC PANEL, COMPREHENSIVE 
-     LIPID PANEL 10. Hypoxic brain damage ([facility] [facility]- stable, no significant decline per family, continue to monitor 11. CLL (chronic lymphocytic leukemia) ([facility]- monitored by specialist, started on treatment due to decrease in PLT, per family he was on medication but is off now (? Rash). -     CBC W/O DIFF 12. Overweight (BMI 25.0-29. 9)- improved, congratulated, I have reviewed/discussed the above normal BMI with the patient. I have recommended the following interventions: encourage exercise and lifestyle education regarding diet. Follow-up and Dispositions · Return in about 6 months (around 11/1/2019), or if symptoms worsen or fail to improve, for Regular follow up. 
  
    
------------------------------------------------------------------------------------------ Subjective: Annual Wellness Visit- Subsequent Visit End of Life Planning: This was discussed with him today and he has NO advanced directive  - add't info provided. Reviewed DNR/DNI and I'm not sure if patient is capable of understanding of making this decision. Referred for ACP planning. Depression Screen:  
3 most recent PHQ Screens 5/1/2019 PHQ Not Done - Little interest or pleasure in doing things Not at all Feeling down, depressed, irritable, or hopeless Not at all Total Score PHQ 2 0 Fall Risk:  
Fall Risk Assessment, last 12 mths 5/1/2019 Able to walk? Yes Fall in past 12 months? No  
 
 
Abuse Screen: 
Abuse Screening Questionnaire 5/1/2019 Do you ever feel afraid of your partner? Dakota Pineda Are you in a relationship with someone who physically or mentally threatens you? Dakota Pineda Is it safe for you to go home? Sispranav Pena Alcohol Risk Factor Screening: On any occasion during the past 3 months, have you had more than 4 drinks containing alcohol? No 
Do you average more than 14 drinks per week? No 
 
Hearing Loss: Hearing is good. Cognition Screen: 
Has your family/caregiver stated any concerns about your memory: yes Cognition: stable Activities of Daily Living:   
Requires assistance with: no ADLs Home contains: no safety equipment. Exercise: very active Adult Nutrition Screen: No risk factors noted. Health Maintenance Daily Aspirin: he stopped ASA since last visit AAA Screening: n/a Glaucoma Screening: UTD Immunizations:  
  Influenza: up to date Tetanus: up to date Shingles: due for Shingrix - script provided Pneumonia: up to date Cancer screening:  
 Prostate: guidelines reviewed, will defer this year Colon: guidelines reviewed, FIT testing ordered in '14 and '18 but never completed, last done in '10 per old records, will get set up this year. Patient Care Team: 
Modesto Chang MD as PCP - General (Internal Medicine) Ileana Barkley MD (Hematology and Oncology) Hill Patrick MD as Physician (Ophthalmology) Lavon Clarke DO as Physician (Dermatology) In addition to the above issues we also reviewed the following acute and/or chronic problems: 
 
Endocrine Review He is seen for diabetes, hypertension and hyperlipidemia. Since last visit he reports: no significant changes. Home glucose monitoring: is not performed. He reports medication compliance: compliant all of the time. Medication side effects: none. Diabetic diet compliance: compliant most of the time but still snacking at night. Lab review: labs reviewed and discussed with patient. The following sections were reviewed & updated as appropriate: PMH, PSH, FH, and SH. Patient Active Problem List  
Diagnosis Code  Diabetes mellitus with microalbuminuria ([facility]) E11.29, R80.9  Hypertension, essential I10  
 Hyperlipidemia E78.5  Hypoxic brain damage ([facility]) G93.1  CLL (chronic lymphocytic leukemia) ([facility]) C91.90  
 Hearing loss H91.90 Prior to Admission medications Medication Sig Start Date End Date Taking? Authorizing Provider  
trandolapril (MAVIK) 2 mg tablet TAKE 1 TAB BY MOUTH DAILY. 4/28/19  Yes Modesto Chang MD  
glipiZIDE (GLUCOTROL) 10 mg tablet TAKE 1 TAB BY MOUTH TWO (2) TIMES A DAY. 4/28/19  Yes Modesto Chang MD  
JANUMET  mg per tablet TAKE 1 TAB BY MOUTH TWO (2) TIMES DAILY (WITH MEALS). 11/18/18  Yes Modesto Chang MD  
lovastatin (MEVACOR) 40 mg tablet TAKE 1 TAB BY MOUTH DAILY.  11/18/18  Yes Modesto Chang MD  
glucose blood VI test strips (ONETOUCH ULTRA BLUE TEST STRIP) strip TEST DAILY. Dx:  E11.29 5/29/18  Yes Juan Moore MD  
Lancets misc Test BS daily. DX:250.00 2/22/17  Yes Juan Moore MD  
Blood-Glucose Meter monitoring kit Test BS daily. DX:250.00 7/28/15  Yes Juan Moore MD  
multivitamin (ONE A DAY) tablet Take 1 Tab by mouth daily. Yes Provider, Historical  
nystatin (MYCOSTATIN) powder Apply  to affected area four (4) times daily. 1/26/18   Juan Moore MD  
albuterol (PROVENTIL HFA, VENTOLIN HFA, PROAIR HFA) 90 mcg/actuation inhaler Take 1-2 Puffs by inhalation every six (6) hours as needed for Shortness of Breath (coughing). 7/21/17   Juan Moore MD  
  
 
 
Allergies Allergen Reactions  Pcn [Penicillins] Anaphylaxis Review of Systems Constitutional: Positive for weight loss. Negative for chills and fever. HENT: Positive for tinnitus (L ear, a few weeks ago, resolved). Respiratory: Negative for cough and shortness of breath. Cardiovascular: Negative for chest pain and palpitations. Gastrointestinal: Negative for abdominal pain, blood in stool, constipation, diarrhea, heartburn, nausea and vomiting. Genitourinary: He reports: nocturia x 2-3 per family. Musculoskeletal: Negative for joint pain and myalgias. Skin: Negative for rash (has cleared up). Neurological: Negative for tingling, focal weakness and headaches. Endo/Heme/Allergies: Does not bruise/bleed easily. Psychiatric/Behavioral: Negative for depression. The patient is not nervous/anxious and does not have insomnia. Objective:  
Physical Exam  
Constitutional: No distress. HENT:  
Mouth/Throat: Mucous membranes are normal.  
Eyes: Conjunctivae are normal. No scleral icterus. Neck: Neck supple. Cardiovascular: Regular rhythm and normal heart sounds. No murmur heard. Pulmonary/Chest: Effort normal and breath sounds normal. He has no wheezes. He has no rales. Abdominal: Soft. Bowel sounds are normal. He exhibits no mass. There is no hepatosplenomegaly. There is no tenderness. Musculoskeletal: He exhibits no edema. Lymphadenopathy:  
  He has no cervical adenopathy. Neurological:  
Left Foot: 
 Visual Exam: callous - base of 1st toe Pulse DP: 2+ (normal) Filament test: normal sensation Right Foot: 
 Visual Exam: normal  
 Pulse DP: 2+ (normal) Filament test: normal sensation Skin: No rash noted. Psychiatric: He has a normal mood and affect. His behavior is normal.  
  
 
 
Visit Vitals /62 Pulse 80 Temp 97.8 °F (36.6 °C) (Oral) Resp 20 Ht 5' 7\" (1.702 m) Wt 176 lb (79.8 kg) SpO2 99% BMI 27.57 kg/m² Advised him to call back or return to office if symptoms worsen/change/persist. 
Discussed expected course/resolution/complications of diagnosis in detail with patient. Medication risks/benefits/costs/interactions/alternatives discussed with patient. He was given an after visit summary which includes diagnoses, current medications, & vitals. He expressed understanding with the diagnosis and plan. Aspects of this note may have been generated using voice recognition software. Despite editing, there may be some syntax errors.   
 
 
Fabi Hernandez MD

## 2019-05-04 ENCOUNTER — HOSPITAL ENCOUNTER (OUTPATIENT)
Dept: LAB | Age: 59
Discharge: HOME | End: 2019-05-04
Payer: MEDICARE

## 2019-05-04 PROCEDURE — 36415 COLL VENOUS BLD VENIPUNCTURE: CPT

## 2019-05-04 PROCEDURE — 80061 LIPID PANEL: CPT

## 2019-05-04 PROCEDURE — 83036 HEMOGLOBIN GLYCOSYLATED A1C: CPT

## 2019-05-04 PROCEDURE — 82043 UR ALBUMIN QUANTITATIVE: CPT

## 2019-05-04 PROCEDURE — 85027 COMPLETE CBC AUTOMATED: CPT

## 2019-05-04 PROCEDURE — 80053 COMPREHEN METABOLIC PANEL: CPT

## 2019-05-05 LAB
ALBUMIN SERPL-MCNC: 4.8 G/DL (ref 3.6–4.8)
ALBUMIN/CREAT UR: 22.8 MG/G CREAT (ref 0–30)
ALBUMIN/GLOB SERPL: 2.2 {RATIO} (ref 1.2–2.2)
ALP SERPL-CCNC: 102 IU/L (ref 39–117)
ALT SERPL-CCNC: 34 IU/L (ref 0–44)
AST SERPL-CCNC: 26 IU/L (ref 0–40)
BILIRUB SERPL-MCNC: 0.5 MG/DL (ref 0–1.2)
BUN SERPL-MCNC: 12 MG/DL (ref 8–27)
BUN/CREAT SERPL: 16 (ref 10–24)
CALCIUM SERPL-MCNC: 9.5 MG/DL (ref 8.6–10.2)
CHLORIDE SERPL-SCNC: 104 MMOL/L (ref 96–106)
CHOLEST SERPL-MCNC: 113 MG/DL (ref 100–199)
CO2 SERPL-SCNC: 23 MMOL/L (ref 20–29)
CREAT SERPL-MCNC: 0.76 MG/DL (ref 0.76–1.27)
CREAT UR-MCNC: 127.9 MG/DL
ERYTHROCYTE [DISTWIDTH] IN BLOOD BY AUTOMATED COUNT: 14.9 % (ref 12.3–15.4)
EST. AVERAGE GLUCOSE BLD GHB EST-MCNC: 123 MG/DL
GLOBULIN SER CALC-MCNC: 2.2 G/DL (ref 1.5–4.5)
GLUCOSE SERPL-MCNC: 80 MG/DL (ref 65–99)
HBA1C MFR BLD: 5.9 % (ref 4.8–5.6)
HCT VFR BLD AUTO: 45.2 % (ref 37.5–51)
HDLC SERPL-MCNC: 26 MG/DL
HGB BLD-MCNC: 15 G/DL (ref 13–17.7)
LDLC SERPL CALC-MCNC: 70 MG/DL (ref 0–99)
MCH RBC QN AUTO: 29.4 PG (ref 26.6–33)
MCHC RBC AUTO-ENTMCNC: 33.2 G/DL (ref 31.5–35.7)
MCV RBC AUTO: 89 FL (ref 79–97)
MICROALBUMIN UR-MCNC: 29.2 UG/ML
PLATELET # BLD AUTO: 168 X10E3/UL (ref 150–379)
POTASSIUM SERPL-SCNC: 4.9 MMOL/L (ref 3.5–5.2)
PROT SERPL-MCNC: 7 G/DL (ref 6–8.5)
RBC # BLD AUTO: 5.1 X10E6/UL (ref 4.14–5.8)
SODIUM SERPL-SCNC: 145 MMOL/L (ref 134–144)
TRIGL SERPL-MCNC: 83 MG/DL (ref 0–149)
VLDLC SERPL CALC-MCNC: 17 MG/DL (ref 5–40)
WBC # BLD AUTO: 7.6 X10E3/UL (ref 3.4–10.8)

## 2019-05-05 RX ORDER — GLIPIZIDE 10 MG/1
TABLET ORAL
Qty: 180 TAB | Refills: 0
Start: 2019-05-05 | End: 2019-10-24 | Stop reason: DRUGHIGH

## 2019-05-06 NOTE — PROGRESS NOTES
Results released to patient via [employer]. All labs are stable or at goal for him.   Will decreased Glipizide from 10mg BID to 5mg BID due stable/well controlled A1c

## 2019-05-12 RX ORDER — LOVASTATIN 40 MG/1
TABLET ORAL
Qty: 90 TAB | Refills: 1 | Status: SHIPPED | OUTPATIENT
Start: 2019-05-12 | End: 2019-11-16 | Stop reason: SDUPTHER

## 2019-05-12 RX ORDER — SITAGLIPTIN AND METFORMIN HYDROCHLORIDE 500; 50 MG/1; MG/1
TABLET, FILM COATED ORAL
Qty: 180 TAB | Refills: 1 | Status: SHIPPED | OUTPATIENT
Start: 2019-05-12 | End: 2019-11-14 | Stop reason: SDUPTHER

## 2019-10-24 RX ORDER — GLIPIZIDE 5 MG/1
5 TABLET ORAL 2 TIMES DAILY
Qty: 180 TAB | Refills: 1 | Status: SHIPPED | OUTPATIENT
Start: 2019-10-24 | End: 2020-04-24

## 2019-10-24 RX ORDER — TRANDOLAPRIL 2 MG/1
TABLET ORAL
Qty: 90 TAB | Refills: 1 | Status: SHIPPED | OUTPATIENT
Start: 2019-10-24 | End: 2020-04-24

## 2019-11-01 ENCOUNTER — HOSPITAL ENCOUNTER (OUTPATIENT)
Dept: LAB | Age: 59
Discharge: HOME | End: 2019-11-01
Payer: MEDICARE

## 2019-11-01 ENCOUNTER — OFFICE VISIT (OUTPATIENT)
Dept: INTERNAL MEDICINE CLINIC | Age: 59
End: 2019-11-01

## 2019-11-01 VITALS
RESPIRATION RATE: 20 BRPM | SYSTOLIC BLOOD PRESSURE: 100 MMHG | DIASTOLIC BLOOD PRESSURE: 64 MMHG | BODY MASS INDEX: 27.94 KG/M2 | OXYGEN SATURATION: 98 % | HEART RATE: 84 BPM | TEMPERATURE: 97.6 F | WEIGHT: 178 LBS | HEIGHT: 67 IN

## 2019-11-01 DIAGNOSIS — E11.29 DIABETES MELLITUS WITH MICROALBUMINURIA (HCC): Primary | ICD-10-CM

## 2019-11-01 DIAGNOSIS — I10 HYPERTENSION, ESSENTIAL: ICD-10-CM

## 2019-11-01 DIAGNOSIS — G93.1 HYPOXIC BRAIN DAMAGE (HCC): ICD-10-CM

## 2019-11-01 DIAGNOSIS — E66.3 OVERWEIGHT (BMI 25.0-29.9): ICD-10-CM

## 2019-11-01 DIAGNOSIS — R80.9 DIABETES MELLITUS WITH MICROALBUMINURIA (HCC): Primary | ICD-10-CM

## 2019-11-01 DIAGNOSIS — C91.10 CLL (CHRONIC LYMPHOCYTIC LEUKEMIA) (HCC): ICD-10-CM

## 2019-11-01 DIAGNOSIS — R05.9 COUGH: ICD-10-CM

## 2019-11-01 DIAGNOSIS — E78.00 PURE HYPERCHOLESTEROLEMIA: ICD-10-CM

## 2019-11-01 PROCEDURE — 83036 HEMOGLOBIN GLYCOSYLATED A1C: CPT

## 2019-11-01 PROCEDURE — 36415 COLL VENOUS BLD VENIPUNCTURE: CPT

## 2019-11-01 PROCEDURE — 80053 COMPREHEN METABOLIC PANEL: CPT

## 2019-11-01 NOTE — PROGRESS NOTES
Follow up. I personally performed the service described in the documentation recorded by the scribe in my presence, and it accurately and completely records my words and actions.

## 2019-11-02 LAB
ALBUMIN SERPL-MCNC: 4.4 G/DL (ref 3.6–4.8)
ALBUMIN/GLOB SERPL: 2.1 {RATIO} (ref 1.2–2.2)
ALP SERPL-CCNC: 97 IU/L (ref 39–117)
ALT SERPL-CCNC: 38 IU/L (ref 0–44)
AST SERPL-CCNC: 29 IU/L (ref 0–40)
BILIRUB SERPL-MCNC: 0.4 MG/DL (ref 0–1.2)
BUN SERPL-MCNC: 19 MG/DL (ref 8–27)
BUN/CREAT SERPL: 25 (ref 10–24)
CALCIUM SERPL-MCNC: 9.5 MG/DL (ref 8.6–10.2)
CHLORIDE SERPL-SCNC: 103 MMOL/L (ref 96–106)
CO2 SERPL-SCNC: 25 MMOL/L (ref 20–29)
CREAT SERPL-MCNC: 0.75 MG/DL (ref 0.76–1.27)
EST. AVERAGE GLUCOSE BLD GHB EST-MCNC: 134 MG/DL
GLOBULIN SER CALC-MCNC: 2.1 G/DL (ref 1.5–4.5)
GLUCOSE SERPL-MCNC: 99 MG/DL (ref 65–99)
HBA1C MFR BLD: 6.3 % (ref 4.8–5.6)
POTASSIUM SERPL-SCNC: 4.6 MMOL/L (ref 3.5–5.2)
PROT SERPL-MCNC: 6.5 G/DL (ref 6–8.5)
SODIUM SERPL-SCNC: 142 MMOL/L (ref 134–144)

## 2019-11-04 NOTE — PROGRESS NOTES
Results released to patient via [employer]. All labs are stable or at goal for him. DM worse but still well wnl's. No changes.

## 2019-11-14 RX ORDER — SITAGLIPTIN AND METFORMIN HYDROCHLORIDE 500; 50 MG/1; MG/1
TABLET, FILM COATED ORAL
Qty: 180 TAB | Refills: 1 | Status: SHIPPED | OUTPATIENT
Start: 2019-11-14 | End: 2020-04-24

## 2019-11-18 RX ORDER — LOVASTATIN 40 MG/1
TABLET ORAL
Qty: 90 TAB | Refills: 1 | Status: SHIPPED | OUTPATIENT
Start: 2019-11-18 | End: 2020-04-24

## 2020-04-24 RX ORDER — GLIPIZIDE 5 MG/1
TABLET ORAL
Qty: 180 TAB | Refills: 1 | Status: SHIPPED | OUTPATIENT
Start: 2020-04-24 | End: 2020-09-01

## 2020-04-24 RX ORDER — SITAGLIPTIN AND METFORMIN HYDROCHLORIDE 500; 50 MG/1; MG/1
TABLET, FILM COATED ORAL
Qty: 180 TAB | Refills: 1 | Status: SHIPPED | OUTPATIENT
Start: 2020-04-24 | End: 2020-11-04

## 2020-04-24 RX ORDER — TRANDOLAPRIL 2 MG/1
TABLET ORAL
Qty: 90 TAB | Refills: 1 | Status: SHIPPED | OUTPATIENT
Start: 2020-04-24 | End: 2020-10-26

## 2020-04-24 RX ORDER — LOVASTATIN 40 MG/1
TABLET ORAL
Qty: 90 TAB | Refills: 1 | Status: SHIPPED | OUTPATIENT
Start: 2020-04-24 | End: 2020-10-21

## 2020-08-31 ENCOUNTER — OFFICE VISIT (OUTPATIENT)
Dept: INTERNAL MEDICINE CLINIC | Age: 60
End: 2020-08-31
Payer: MEDICARE

## 2020-08-31 ENCOUNTER — HOSPITAL ENCOUNTER (OUTPATIENT)
Dept: LAB | Age: 60
Discharge: HOME | End: 2020-08-31
Payer: MEDICARE

## 2020-08-31 VITALS
TEMPERATURE: 97.6 F | SYSTOLIC BLOOD PRESSURE: 100 MMHG | WEIGHT: 175 LBS | RESPIRATION RATE: 14 BRPM | HEIGHT: 67 IN | BODY MASS INDEX: 27.47 KG/M2 | OXYGEN SATURATION: 97 % | HEART RATE: 80 BPM | DIASTOLIC BLOOD PRESSURE: 68 MMHG

## 2020-08-31 DIAGNOSIS — G93.1 HYPOXIC BRAIN DAMAGE (HCC): ICD-10-CM

## 2020-08-31 DIAGNOSIS — C91.10 CLL (CHRONIC LYMPHOCYTIC LEUKEMIA) (HCC): ICD-10-CM

## 2020-08-31 DIAGNOSIS — R80.9 DIABETES MELLITUS WITH MICROALBUMINURIA (HCC): Primary | ICD-10-CM

## 2020-08-31 DIAGNOSIS — Z12.5 PROSTATE CANCER SCREENING: ICD-10-CM

## 2020-08-31 DIAGNOSIS — E11.29 DIABETES MELLITUS WITH MICROALBUMINURIA (HCC): Primary | ICD-10-CM

## 2020-08-31 DIAGNOSIS — Z12.11 COLON CANCER SCREENING: ICD-10-CM

## 2020-08-31 DIAGNOSIS — Z71.89 ADVANCED CARE PLANNING/COUNSELING DISCUSSION: ICD-10-CM

## 2020-08-31 DIAGNOSIS — Z71.89 EDUCATED ABOUT COVID-19 VIRUS INFECTION: ICD-10-CM

## 2020-08-31 DIAGNOSIS — E78.00 PURE HYPERCHOLESTEROLEMIA: ICD-10-CM

## 2020-08-31 PROCEDURE — 99214 OFFICE O/P EST MOD 30 MIN: CPT | Performed by: INTERNAL MEDICINE

## 2020-08-31 PROCEDURE — G8417 CALC BMI ABV UP PARAM F/U: HCPCS | Performed by: INTERNAL MEDICINE

## 2020-08-31 PROCEDURE — 3017F COLORECTAL CA SCREEN DOC REV: CPT | Performed by: INTERNAL MEDICINE

## 2020-08-31 PROCEDURE — 2022F DILAT RTA XM EVC RTNOPTHY: CPT | Performed by: INTERNAL MEDICINE

## 2020-08-31 PROCEDURE — 36415 COLL VENOUS BLD VENIPUNCTURE: CPT

## 2020-08-31 PROCEDURE — G8754 DIAS BP LESS 90: HCPCS | Performed by: INTERNAL MEDICINE

## 2020-08-31 PROCEDURE — 83036 HEMOGLOBIN GLYCOSYLATED A1C: CPT

## 2020-08-31 PROCEDURE — G8427 DOCREV CUR MEDS BY ELIG CLIN: HCPCS | Performed by: INTERNAL MEDICINE

## 2020-08-31 PROCEDURE — 3046F HEMOGLOBIN A1C LEVEL >9.0%: CPT | Performed by: INTERNAL MEDICINE

## 2020-08-31 PROCEDURE — 80061 LIPID PANEL: CPT

## 2020-08-31 PROCEDURE — 82043 UR ALBUMIN QUANTITATIVE: CPT

## 2020-08-31 PROCEDURE — 85027 COMPLETE CBC AUTOMATED: CPT

## 2020-08-31 PROCEDURE — 80053 COMPREHEN METABOLIC PANEL: CPT

## 2020-08-31 PROCEDURE — G8752 SYS BP LESS 140: HCPCS | Performed by: INTERNAL MEDICINE

## 2020-08-31 PROCEDURE — G8510 SCR DEP NEG, NO PLAN REQD: HCPCS | Performed by: INTERNAL MEDICINE

## 2020-08-31 PROCEDURE — G0463 HOSPITAL OUTPT CLINIC VISIT: HCPCS | Performed by: INTERNAL MEDICINE

## 2020-08-31 NOTE — PROGRESS NOTES
Ruth Rudolph is a 56 y.o. male who was seen in clinic today (8/31/2020) for a full physical.          Assessment & Plan:     Diagnoses and all orders for this visit:    1. Diabetes mellitus with microalbuminuria ([facility]- previously well controlled, continue current treatment pending review of labs    -     METABOLIC PANEL, COMPREHENSIVE  -     CBC W/O DIFF  -     HEMOGLOBIN A1C WITH EAG  -     LIPID PANEL  -     MICROALBUMIN, UR, RAND W/ MICROALB/CREAT RATIO  -      DIABETES FOOT EXAM    2. Pure hypercholesterolemia- previously at goal, continue current treatment pending review of labs   -     METABOLIC PANEL, COMPREHENSIVE  -     LIPID PANEL    3. CLL (chronic lymphocytic leukemia) ([facility]- asymptomatic, will defer to specialist    4. Advanced care planning/counseling discussion- encouraged family to get M-POA and L-POA    5. Hypoxic brain damage (HCC)    6. Colon cancer screening- overdue, brother in law reports he is aware and will get him set up for c-scope, has not completed FIT testing in the past    7. Prostate cancer screening- will defer this year, will repeat next year    8. Educated about COVID-19 virus infection      Follow-up and Dispositions    · Return in about 6 months (around 2/28/2021) for FULL PHYSICAL - 30 minutes. Subjective:   Ysidro Pallas is here today for a regular f/u for Diabetes, high BP, and high cholesterol. Endocrine Review  He is seen for diabetes. Testing: is not performed. He reports medication compliance: compliant all of the time. Medication side effects: none. Diabetic diet compliance: compliant all of the time. Lab review: labs reviewed and discussed with patient. Cardiovascular Review  The patient has hypertension and hyperlipidemia. He reports taking medications as instructed, no medication side effects noted, patient does not perform home BP monitoring.   Diet and Lifestyle: generally follows a low fat low cholesterol diet, generally follows a low sodium diet, exercises sporadically, no formal exercise but active during the day. Labs: reviewed and discussed with patient. Patient Care Team:  Germaine Monroe MD as PCP - General (Internal Medicine)  Germaine Monroe MD as PCP - [facility] Provider  Janet Moore MD (Hematology and Oncology)  Lico Julian MD as Physician (Ophthalmology)  Mj Reyes DO as Physician (Dermatology)         The following sections were reviewed & updated as appropriate: PMH, PSH, FH, and SH. Patient Active Problem List   Diagnosis Code    Diabetes mellitus with microalbuminuria ([facility] E11.29, R80.9    Hypertension, essential I10    Hyperlipidemia E78.5    Hypoxic brain damage (HCC) G93.1    CLL (chronic lymphocytic leukemia) ([facility] C91.10    Hearing loss H91.90    Nuclear cataract Jese Mater          Prior to Admission medications    Medication Sig Start Date End Date Taking? Authorizing Provider   trandolapriL (MAVIK) 2 mg tablet TAKE 1 TAB BY MOUTH DAILY. 4/24/20   Germaine Monroe MD   glipiZIDE (GLUCOTROL) 5 mg tablet TAKE 1 TABLET BY MOUTH TWICE A DAY 4/24/20   Germaine Monroe MD   Janumet  mg per tablet TAKE 1 TAB BY MOUTH TWO (2) TIMES DAILY (WITH MEALS). 4/24/20   Germaine Monroe MD   lovastatin (MEVACOR) 40 mg tablet TAKE 1 TAB BY MOUTH DAILY. 4/24/20   Germaine Monroe MD   varicella-zoster recombinant, PF, (SHINGRIX, PF,) 50 mcg/0.5 mL susr injection 0.5 ml IM once and then repeat in 2-6 months. 5/1/19   Germaine Monroe MD   glucose blood VI test strips (ONETOUCH ULTRA BLUE TEST STRIP) strip TEST DAILY. Dx:  E11.29 5/29/18   Germaine Monroe MD   nystatin (MYCOSTATIN) powder Apply  to affected area four (4) times daily. 1/26/18   Germaine Monroe MD   albuterol (PROVENTIL HFA, VENTOLIN HFA, PROAIR HFA) 90 mcg/actuation inhaler Take 1-2 Puffs by inhalation every six (6) hours as needed for Shortness of Breath (coughing).  7/21/17   Nati Mcgrath, Kassandra Causey MD   Lancets misc Test BS daily. DX:250.00 2/22/17   Betito Farrell MD   Blood-Glucose Meter monitoring kit Test BS daily. DX:250.00 7/28/15   Betito Farrell MD   multivitamin (ONE A DAY) tablet Take 1 Tab by mouth daily. Provider, Historical          Allergies   Allergen Reactions    Pcn [Penicillins] Anaphylaxis              Review of Systems   Constitutional: Negative for malaise/fatigue and weight loss. Respiratory: Negative for cough and shortness of breath. Cardiovascular: Negative for chest pain, palpitations and leg swelling. Gastrointestinal: Negative for abdominal pain, constipation, diarrhea, heartburn, nausea and vomiting. Musculoskeletal: Negative for joint pain and myalgias. Skin: Negative for rash. Neurological: Negative for dizziness and headaches. Psychiatric/Behavioral: Negative for depression. The patient is not nervous/anxious and does not have insomnia. Objective:   Physical Exam  Constitutional:       General: He is not in acute distress. Appearance: Normal appearance. Eyes:      Conjunctiva/sclera: Conjunctivae normal.   Cardiovascular:      Rate and Rhythm: Regular rhythm. Heart sounds: No murmur. Pulmonary:      Effort: Pulmonary effort is normal.      Breath sounds: Normal breath sounds. No decreased breath sounds or wheezing. Abdominal:      General: Bowel sounds are normal.      Palpations: Abdomen is soft. Tenderness: There is no abdominal tenderness. Musculoskeletal:      Right lower leg: No edema. Left lower leg: No edema.    Neurological:      Comments:   Left Foot:   Visual Exam: normal    Pulse DP: 2+ (normal)   Filament test: normal sensation   Right Foot:   Visual Exam: normal    Pulse DP: 2+ (normal)   Filament test: normal sensation    Psychiatric:         Mood and Affect: Mood and affect normal.         Behavior: Behavior normal.            Visit Vitals  /68   Pulse 80   Temp 97.6 °F (36.4 °C) (Temporal)   Resp 14   Ht 5' 7.2\" (1.707 m)   Wt 175 lb (79.4 kg)   SpO2 97%   BMI 27.25 kg/m²          Advised him to call back or return to office if symptoms worsen/change/persist.  Discussed expected course/resolution/complications of diagnosis in detail with patient. Medication risks/benefits/costs/interactions/alternatives discussed with patient. He was given an after visit summary which includes diagnoses, current medications, & vitals. He expressed understanding with the diagnosis and plan. Aspects of this note may have been generated using voice recognition software. Despite editing, there may be some syntax errors.        Jarett Lockhart MD

## 2020-08-31 NOTE — ACP (ADVANCE CARE PLANNING)
Advance Care Planning     Advance Care Planning (ACP) Physician/NP/PA (Provider) Conversation    Date of ACP Conversation: 08/31/20  Persons included in Conversation:  patient  Length of ACP Conversation in minutes: <16 minutes (Non-Billable)    Authorized Decision Maker (if patient is incapable of making informed decisions):   Next of Kin by law (only applies in absence of above)       He has NO advanced directive  - add't info provided. Reviewed DNR/DNI and patient is not capable of making this decision.       Bud Blue MD

## 2020-09-01 LAB
ALBUMIN SERPL-MCNC: 4.8 G/DL (ref 3.8–4.8)
ALBUMIN/CREAT UR: 13 MG/G CREAT (ref 0–29)
ALBUMIN/GLOB SERPL: 2.8 {RATIO} (ref 1.2–2.2)
ALP SERPL-CCNC: 98 IU/L (ref 39–117)
ALT SERPL-CCNC: 41 IU/L (ref 0–44)
AST SERPL-CCNC: 34 IU/L (ref 0–40)
BILIRUB SERPL-MCNC: 0.5 MG/DL (ref 0–1.2)
BUN SERPL-MCNC: 11 MG/DL (ref 8–27)
BUN/CREAT SERPL: 13 (ref 10–24)
CALCIUM SERPL-MCNC: 9.5 MG/DL (ref 8.6–10.2)
CHLORIDE SERPL-SCNC: 102 MMOL/L (ref 96–106)
CHOLEST SERPL-MCNC: 116 MG/DL (ref 100–199)
CO2 SERPL-SCNC: 27 MMOL/L (ref 20–29)
COMMENT:: ABNORMAL
CREAT SERPL-MCNC: 0.83 MG/DL (ref 0.76–1.27)
CREAT UR-MCNC: 55.2 MG/DL
ERYTHROCYTE [DISTWIDTH] IN BLOOD BY AUTOMATED COUNT: 13.8 % (ref 11.6–15.4)
EST. AVERAGE GLUCOSE BLD GHB EST-MCNC: 114 MG/DL
GLOBULIN SER CALC-MCNC: 1.7 G/DL (ref 1.5–4.5)
GLUCOSE SERPL-MCNC: 56 MG/DL (ref 65–99)
HBA1C MFR BLD: 5.6 % (ref 4.8–5.6)
HCT VFR BLD AUTO: 44.4 % (ref 37.5–51)
HDLC SERPL-MCNC: 26 MG/DL
HGB BLD-MCNC: 14.9 G/DL (ref 13–17.7)
LDLC SERPL CALC-MCNC: 74 MG/DL (ref 0–99)
MCH RBC QN AUTO: 29.6 PG (ref 26.6–33)
MCHC RBC AUTO-ENTMCNC: 33.6 G/DL (ref 31.5–35.7)
MCV RBC AUTO: 88 FL (ref 79–97)
MICROALBUMIN UR-MCNC: 7.4 UG/ML
PLATELET # BLD AUTO: 153 X10E3/UL (ref 150–450)
POTASSIUM SERPL-SCNC: 4.4 MMOL/L (ref 3.5–5.2)
PROT SERPL-MCNC: 6.5 G/DL (ref 6–8.5)
RBC # BLD AUTO: 5.04 X10E6/UL (ref 4.14–5.8)
SODIUM SERPL-SCNC: 144 MMOL/L (ref 134–144)
TRIGL SERPL-MCNC: 79 MG/DL (ref 0–149)
VLDLC SERPL CALC-MCNC: 16 MG/DL (ref 5–40)
WBC # BLD AUTO: 9.1 X10E3/UL (ref 3.4–10.8)

## 2020-09-01 RX ORDER — GLIPIZIDE 5 MG/1
TABLET ORAL
Qty: 180 TAB | Refills: 1
Start: 2020-09-01 | End: 2020-10-21

## 2020-09-01 NOTE — PROGRESS NOTES
Results released to patient via [employer]. All labs are stable or at goal for him.   Will decrease glipizide from BID to daily

## 2020-10-21 RX ORDER — GLIPIZIDE 5 MG/1
5 TABLET, FILM COATED, EXTENDED RELEASE ORAL DAILY
Qty: 90 TAB | Refills: 0 | Status: SHIPPED | OUTPATIENT
Start: 2020-10-21 | End: 2021-01-18

## 2020-10-21 RX ORDER — LOVASTATIN 40 MG/1
TABLET ORAL
Qty: 90 TAB | Refills: 0 | Status: SHIPPED | OUTPATIENT
Start: 2020-10-21 | End: 2021-01-19

## 2020-10-26 RX ORDER — TRANDOLAPRIL 2 MG/1
TABLET ORAL
Qty: 90 TAB | Refills: 1 | Status: SHIPPED | OUTPATIENT
Start: 2020-10-26 | End: 2021-03-03

## 2020-11-04 RX ORDER — SITAGLIPTIN AND METFORMIN HYDROCHLORIDE 500; 50 MG/1; MG/1
TABLET, FILM COATED ORAL
Qty: 180 TAB | Refills: 1 | Status: SHIPPED | OUTPATIENT
Start: 2020-11-04 | End: 2021-05-09

## 2021-01-12 ENCOUNTER — OFFICE VISIT (OUTPATIENT)
Dept: INTERNAL MEDICINE CLINIC | Age: 61
End: 2021-01-12
Payer: MEDICARE

## 2021-01-12 VITALS
WEIGHT: 179.2 LBS | HEART RATE: 92 BPM | HEIGHT: 67 IN | OXYGEN SATURATION: 93 % | DIASTOLIC BLOOD PRESSURE: 52 MMHG | BODY MASS INDEX: 28.12 KG/M2 | TEMPERATURE: 97.6 F | RESPIRATION RATE: 16 BRPM | SYSTOLIC BLOOD PRESSURE: 90 MMHG

## 2021-01-12 DIAGNOSIS — R21 RASH: Primary | ICD-10-CM

## 2021-01-12 DIAGNOSIS — M79.89 SWELLING OF BOTH HANDS: ICD-10-CM

## 2021-01-12 PROCEDURE — G8432 DEP SCR NOT DOC, RNG: HCPCS | Performed by: NURSE PRACTITIONER

## 2021-01-12 PROCEDURE — 99213 OFFICE O/P EST LOW 20 MIN: CPT | Performed by: NURSE PRACTITIONER

## 2021-01-12 PROCEDURE — G8752 SYS BP LESS 140: HCPCS | Performed by: NURSE PRACTITIONER

## 2021-01-12 PROCEDURE — G8427 DOCREV CUR MEDS BY ELIG CLIN: HCPCS | Performed by: NURSE PRACTITIONER

## 2021-01-12 PROCEDURE — G8419 CALC BMI OUT NRM PARAM NOF/U: HCPCS | Performed by: NURSE PRACTITIONER

## 2021-01-12 PROCEDURE — G8754 DIAS BP LESS 90: HCPCS | Performed by: NURSE PRACTITIONER

## 2021-01-12 PROCEDURE — G0463 HOSPITAL OUTPT CLINIC VISIT: HCPCS | Performed by: NURSE PRACTITIONER

## 2021-01-12 PROCEDURE — 3017F COLORECTAL CA SCREEN DOC REV: CPT | Performed by: NURSE PRACTITIONER

## 2021-01-12 RX ORDER — METHYLPREDNISOLONE 4 MG/1
TABLET ORAL
Qty: 1 DOSE PACK | Refills: 0 | Status: SHIPPED | OUTPATIENT
Start: 2021-01-12 | End: 2021-02-03 | Stop reason: ALTCHOICE

## 2021-01-12 NOTE — PROGRESS NOTES
HISTORY OF PRESENT ILLNESS  Rhett Jeans is a 56 y.o. male. Patient reports warmth, swelling, and redness of both hands x 1 month with no pain. He had a similar episode about 2-3 months ago that resolved after a couple weeks. He states he just used lotion and it got better. Patient plays video games most of the time during the day. He denies any pain in his hands. No known family or personal history of autoimmune disease. Patient is followed by [facility] for CLL. He reports more recently a drop in platelets and patient also has elevated wbc. HPI    Review of Systems   Musculoskeletal:        Redness, swelling, and warmth of both hands       Physical Exam  Constitutional:       Appearance: Normal appearance. Skin:     Comments: Bilateral hand swelling from fingers to wrists, with redness across metacarpal region, redness also noted to both palms; no pain with palpation of joints or movement of fingers or wrists; both hands are warm to touch; radial pulses strong and equal   Neurological:      Mental Status: He is alert. Mental status is at baseline. Psychiatric:         Mood and Affect: Mood normal.         Behavior: Behavior normal.         ASSESSMENT and PLAN    ICD-10-CM ICD-9-CM    1. Rash  R21 782.1 SED RATE (ESR)      RHEUMASSURE      ANTINUCLEAR ANTIBODIES, IFA   2.  Swelling of both hands  M79.89 729.81 SED RATE (ESR)      RHEUMASSURE      ANTINUCLEAR ANTIBODIES, IFA     Orders Placed This Encounter    SED RATE (ESR)    RHEUMASSURE    ANTINUCLEAR ANTIBODIES, IFA    methylPREDNISolone (MEDROL DOSEPACK) 4 mg tablet   concern for inflammatory arthritis  Labs ordered  Medrol dose pack ordered  Plan of care discussed with Dr. Juanjose Wright

## 2021-01-15 NOTE — PROGRESS NOTES
Nilson Angel is a 62 y.o. male who was seen in clinic today (11/1/2019). Assessment & Plan:     Diagnoses and all orders for this visit:    1. Diabetes mellitus with microalbuminuria ([facility]- previously well controlled, long term diabetic complications discussed, reviewed following up with specialists and/or referrals placed below and continue current plan pending review of labs. -     METABOLIC PANEL, COMPREHENSIVE  -     HEMOGLOBIN A1C WITH EAG  -      DIABETES FOOT EXAM    2. Hypertension, essential- well controlled, continue current treatment pending review of labs   -     METABOLIC PANEL, COMPREHENSIVE    3. Pure hypercholesterolemia- at goal, continue current treatment pending review of labs   -     METABOLIC PANEL, COMPREHENSIVE    4. Cough- improving, reviewed viral vs flu shot related, no red flags, reviewed expectations. Family will notify me if it gets worse. 5. Hypoxic brain damage ([facility]- no changes    6. CLL (ch[facility] lymphocytic [facility]) ([facility]- stable, defer to specialist    7. Overweight (BMI 25.0-29. 9)- stable, needs improvement, I have recommended the following interventions: lifestyle education regarding diet. Overdue for colon cancer screening. Brother-in-law thought it was done, pt's sister confirmed it was not. They will call to schedule. Follow-up and Dispositions    · Return in about 6 months (around 5/1/2020) for FULL PHYSICAL - 30 minutes. Subjective:   Matthew Amaral was seen today for Diabetes; Cholesterol Problem; and Hypertension    Endocrine Review  He is seen for diabetes. Since last visit he reports: glipizide reduced. Home glucose monitoring: is not performed. He reports medication compliance: compliant all of the time. Medication side effects: none. Diabetic diet compliance: noncompliant some of the time. Lab review: labs reviewed and discussed with patient. Cardiovascular Review  The patient has hypertension and hyperlipidemia.   Since last visit: no changes. He reports taking medications as instructed, no medication side effects noted, patient does not perform home BP monitoring. Diet and Lifestyle: generally follows a low fat low cholesterol diet, generally follows a low sodium diet, sedentary. Labs: reviewed and discussed with patient. Brief Labs:     Lab Results   Component Value Date/Time    Sodium 145 05/04/2019 09:32 AM    Potassium 4.9 05/04/2019 09:32 AM    Creatinine 0.76 05/04/2019 09:32 AM    Creatinine, External 0.68 01/11/2019    Cholesterol, total 113 05/04/2019 09:32 AM    HDL Cholesterol 26 05/04/2019 09:32 AM    LDL, calculated 70 05/04/2019 09:32 AM    Triglyceride 83 05/04/2019 09:32 AM    Hemoglobin A1c 5.9 05/04/2019 09:32 AM          Prior to Admission medications    Medication Sig Start Date End Date Taking? Authorizing Provider   trandolapril (MAVIK) 2 mg tablet TAKE 1 TAB BY MOUTH DAILY. 10/24/19  Yes Rusty Fraga MD   glipiZIDE (GLUCOTROL) 5 mg tablet Take 1 Tab by mouth two (2) times a day. 10/24/19  Yes Rusty Fraga MD   JANUMET  mg per tablet TAKE 1 TAB BY MOUTH TWO (2) TIMES DAILY (WITH MEALS). 5/12/19  Yes Rusty Fraga MD   lovastatin (MEVACOR) 40 mg tablet TAKE 1 TAB BY MOUTH DAILY. 5/12/19  Yes Rusty Fraga MD   glucose blood VI test strips (ONETOUCH ULTRA BLUE TEST STRIP) strip TEST DAILY. Dx:  E11.29 5/29/18  Yes Rusty Fraga MD   albuterol (PROVENTIL HFA, VENTOLIN HFA, PROAIR HFA) 90 mcg/actuation inhaler Take 1-2 Puffs by inhalation every six (6) hours as needed for Shortness of Breath (coughing). 7/21/17  Yes Rusty Fraga MD   Lancets misc Test BS daily. DX:250.00 2/22/17  Yes Rusty Fraga MD   Blood-Glucose Meter monitoring kit Test BS daily. DX:250.00 7/28/15  Yes Rusty Fraga MD   multivitamin (ONE A DAY) tablet Take 1 Tab by mouth daily.    Yes Provider, Historical   varicella-zoster recombinant, PF, (SHINGRIX, PF,) 50 mcg/0.5 mL susr injection 0.5 ml IM once and then repeat in 2-6 months. 5/1/19   Estefani Ann MD   nystatin (MYCOSTATIN) powder Apply  to affected area four (4) times daily. 1/26/18   Estefani Ann MD          Allergies   Allergen Reactions    Pcn [Penicillins] Anaphylaxis           Review of Systems   Respiratory: Positive for cough. Negative for shortness of breath. Went to [facility] in Sept, has a cough that was not improving. Did start to improve and took abx. As of last week started to cough again, minimal, no where as bad as last month. Did get flu shot right when this started. Coughing 2-3 times per day   Cardiovascular: Negative for chest pain and palpitations. Gastrointestinal: Negative for abdominal pain, constipation, diarrhea, nausea and vomiting. Objective:   Physical Exam   Cardiovascular: Regular rhythm and normal heart sounds. No murmur heard. Pulmonary/Chest: Effort normal and breath sounds normal. He has no wheezes. He has no rales. Abdominal: Soft. Bowel sounds are normal. He exhibits no mass. There is no hepatosplenomegaly. There is no tenderness. Musculoskeletal: He exhibits no edema. Neurological:   Left Foot:   Visual Exam: normal    Pulse DP: 2+ (normal)   Filament test: normal sensation   Right Foot:   Visual Exam: normal    Pulse DP: 2+ (normal)   Filament test: normal sensation    Psychiatric: He has a normal mood and affect. His behavior is normal.         Visit Vitals  /64   Pulse 84   Temp 97.6 °F (36.4 °C) (Oral)   Resp 20   Ht 5' 7\" (1.702 m)   Wt 178 lb (80.7 kg)   SpO2 98%   BMI 27.88 kg/m²         Disclaimer:  Advised him to call back or return to office if symptoms worsen/change/persist.  Discussed expected course/resolution/complications of diagnosis in detail with patient. Medication risks/benefits/costs/interactions/alternatives discussed with patient.   He was given an after visit summary which includes diagnoses, current medications, & vitals. He expressed understanding with the diagnosis and plan. Aspects of this note may have been generated using voice recognition software. Despite editing, there may be some syntax errors.        Bia Pham MD social

## 2021-01-18 RX ORDER — GLIPIZIDE 5 MG/1
TABLET, FILM COATED, EXTENDED RELEASE ORAL
Qty: 90 TAB | Refills: 0 | Status: SHIPPED | OUTPATIENT
Start: 2021-01-18 | End: 2021-04-18

## 2021-01-19 ENCOUNTER — TELEPHONE (OUTPATIENT)
Dept: INTERNAL MEDICINE CLINIC | Age: 61
End: 2021-01-19

## 2021-01-19 RX ORDER — LOVASTATIN 40 MG/1
TABLET ORAL
Qty: 90 TAB | Refills: 1 | Status: SHIPPED | OUTPATIENT
Start: 2021-01-19 | End: 2021-03-03 | Stop reason: ALTCHOICE

## 2021-01-19 NOTE — TELEPHONE ENCOUNTER
Prelim RA is normal.  ESR is normal.  CLAY is normal.  So far all auto-immune testing is negative but still are still pending. Would stop medrol dose pack when done. Update me on Monday. May need different/stronger steroid if returns. Hopefully will continue to improve.   I'm curious to see what happens off steroid

## 2021-01-19 NOTE — TELEPHONE ENCOUNTER
Custer Bosworth () [phone]      Called to give update as requested. Swelling is gone, hands are still dark pink/red, but better than they were.

## 2021-01-20 ENCOUNTER — TELEPHONE (OUTPATIENT)
Dept: INTERNAL MEDICINE CLINIC | Age: 61
End: 2021-01-20

## 2021-01-20 NOTE — TELEPHONE ENCOUNTER
Attempted to reach patient's brother-in-law, on HIPAA, via phone, unsucessful. Left message to return call. Did check [facility] for remaining tests, still pending today.

## 2021-01-22 NOTE — TELEPHONE ENCOUNTER
Call to Crystal Segovia, brother-in-law, on HIPAA. Advised of Dr. Sabiha Addison' note and recommendations. Says it is continuing to improve but not completely resolve. He has been off steroids since Tues or Wedn. He will update us on Monday regarding: resolved, improving, returned.

## 2021-01-25 NOTE — TELEPHONE ENCOUNTER
Please call pt's brother-in-law or sister. Rheumassure test comes back suspcious for RA. All other tests were negative. It looks auto-immune. Please provide him information for Dr Beth Prince.

## 2021-01-25 NOTE — TELEPHONE ENCOUNTER
Call to patient's broither-in-law, on HIPAA, identified with 2 identifiers. Advised of Dr. Alexandr Moore' note and recommendations. Dr. Donn Kumar information provided. Requested he call back if unable to obtain appointment in reasonable amount of time. States hands look about the same, maybe a little better.

## 2021-02-03 RX ORDER — PREDNISONE 10 MG/1
TABLET ORAL
Qty: 41 TAB | Refills: 0 | Status: SHIPPED | OUTPATIENT
Start: 2021-02-03 | End: 2021-03-03

## 2021-02-06 LAB — EF %, EXTERNAL: NORMAL

## 2021-02-07 LAB — HBA1C MFR BLD HPLC: 6.5 %

## 2021-02-15 ENCOUNTER — TELEPHONE (OUTPATIENT)
Dept: INTERNAL MEDICINE CLINIC | Age: 61
End: 2021-02-15

## 2021-02-15 NOTE — TELEPHONE ENCOUNTER
Spoke with georges and let her know that patient needs a follow up visit and Dr. Baudilio Linares will follow

## 2021-02-15 NOTE — TELEPHONE ENCOUNTER
Elder Betts states the patient is discharging today and she would need to know if Dr. Baudilio Linares will follow this patient asap, please call.

## 2021-02-19 ENCOUNTER — TELEPHONE (OUTPATIENT)
Dept: INTERNAL MEDICINE CLINIC | Age: 61
End: 2021-02-19

## 2021-02-19 NOTE — TELEPHONE ENCOUNTER
Frederick Campos from [facility] called to advise Dr Crystal Mayfield that they started the patient this week with Home Health for Nursing, PT, and OT.           Frederick Campos with [facility] [phone]

## 2021-02-21 NOTE — TELEPHONE ENCOUNTER
I will follow. Has f/u on 3/3.     Future Appointments   Date Time Provider Elisa Karine   3/3/2021 11:00 AM Sheela Ta MD [facility] BS AMB   3/8/2021 10:00 AM Delfina Romo MD [facility] BS AMB

## 2021-03-03 ENCOUNTER — OFFICE VISIT (OUTPATIENT)
Dept: INTERNAL MEDICINE CLINIC | Age: 61
End: 2021-03-03
Payer: MEDICARE

## 2021-03-03 VITALS
HEIGHT: 67 IN | RESPIRATION RATE: 18 BRPM | DIASTOLIC BLOOD PRESSURE: 59 MMHG | TEMPERATURE: 97.3 F | SYSTOLIC BLOOD PRESSURE: 97 MMHG | WEIGHT: 180 LBS | OXYGEN SATURATION: 99 % | BODY MASS INDEX: 28.25 KG/M2 | HEART RATE: 78 BPM

## 2021-03-03 DIAGNOSIS — H61.21 IMPACTED CERUMEN OF RIGHT EAR: ICD-10-CM

## 2021-03-03 DIAGNOSIS — I25.10 CORONARY ARTERY DISEASE INVOLVING NATIVE CORONARY ARTERY OF NATIVE HEART WITHOUT ANGINA PECTORIS: Primary | ICD-10-CM

## 2021-03-03 DIAGNOSIS — R60.0 LOWER EXTREMITY EDEMA: ICD-10-CM

## 2021-03-03 DIAGNOSIS — R80.9 DIABETES MELLITUS WITH MICROALBUMINURIA (HCC): ICD-10-CM

## 2021-03-03 DIAGNOSIS — E11.29 DIABETES MELLITUS WITH MICROALBUMINURIA (HCC): ICD-10-CM

## 2021-03-03 DIAGNOSIS — R35.0 URINARY FREQUENCY: ICD-10-CM

## 2021-03-03 DIAGNOSIS — Z09 HOSPITAL DISCHARGE FOLLOW-UP: ICD-10-CM

## 2021-03-03 PROBLEM — Z95.1 HISTORY OF HEART BYPASS SURGERY: Status: ACTIVE | Noted: 2021-02-08

## 2021-03-03 LAB
BILIRUB UR QL STRIP: NEGATIVE
GLUCOSE UR-MCNC: NEGATIVE MG/DL
KETONES P FAST UR STRIP-MCNC: NEGATIVE MG/DL
PH UR STRIP: 6.5 [PH] (ref 4.6–8)
PROT UR QL STRIP: NEGATIVE
SP GR UR STRIP: 1.02 (ref 1–1.03)
UA UROBILINOGEN AMB POC: NORMAL (ref 0.2–1)
URINALYSIS CLARITY POC: CLEAR
URINALYSIS COLOR POC: YELLOW
URINE BLOOD POC: NEGATIVE
URINE LEUKOCYTES POC: NEGATIVE
URINE NITRITES POC: NEGATIVE

## 2021-03-03 PROCEDURE — 2022F DILAT RTA XM EVC RTNOPTHY: CPT | Performed by: INTERNAL MEDICINE

## 2021-03-03 PROCEDURE — 3046F HEMOGLOBIN A1C LEVEL >9.0%: CPT | Performed by: INTERNAL MEDICINE

## 2021-03-03 PROCEDURE — G8427 DOCREV CUR MEDS BY ELIG CLIN: HCPCS | Performed by: INTERNAL MEDICINE

## 2021-03-03 PROCEDURE — 3017F COLORECTAL CA SCREEN DOC REV: CPT | Performed by: INTERNAL MEDICINE

## 2021-03-03 PROCEDURE — G8419 CALC BMI OUT NRM PARAM NOF/U: HCPCS | Performed by: INTERNAL MEDICINE

## 2021-03-03 PROCEDURE — G8752 SYS BP LESS 140: HCPCS | Performed by: INTERNAL MEDICINE

## 2021-03-03 PROCEDURE — 69209 REMOVE IMPACTED EAR WAX UNI: CPT | Performed by: INTERNAL MEDICINE

## 2021-03-03 PROCEDURE — 1111F DSCHRG MED/CURRENT MED MERGE: CPT | Performed by: INTERNAL MEDICINE

## 2021-03-03 PROCEDURE — 99214 OFFICE O/P EST MOD 30 MIN: CPT | Performed by: INTERNAL MEDICINE

## 2021-03-03 PROCEDURE — G8754 DIAS BP LESS 90: HCPCS | Performed by: INTERNAL MEDICINE

## 2021-03-03 PROCEDURE — G0463 HOSPITAL OUTPT CLINIC VISIT: HCPCS | Performed by: INTERNAL MEDICINE

## 2021-03-03 PROCEDURE — G8432 DEP SCR NOT DOC, RNG: HCPCS | Performed by: INTERNAL MEDICINE

## 2021-03-03 PROCEDURE — 81003 URINALYSIS AUTO W/O SCOPE: CPT | Performed by: INTERNAL MEDICINE

## 2021-03-03 RX ORDER — POTASSIUM CHLORIDE 1500 MG/1
20 TABLET, FILM COATED, EXTENDED RELEASE ORAL 2 TIMES DAILY
COMMUNITY
End: 2021-05-11 | Stop reason: SDUPTHER

## 2021-03-03 RX ORDER — AMIODARONE HYDROCHLORIDE 200 MG/1
200 TABLET ORAL 2 TIMES DAILY
COMMUNITY
End: 2021-05-05

## 2021-03-03 RX ORDER — ATORVASTATIN CALCIUM 80 MG/1
80 TABLET, FILM COATED ORAL DAILY
COMMUNITY
End: 2021-05-17 | Stop reason: SDUPTHER

## 2021-03-03 RX ORDER — LISINOPRIL 5 MG/1
2.5 TABLET ORAL DAILY
COMMUNITY
End: 2021-06-08

## 2021-03-03 RX ORDER — ASPIRIN 325 MG
325 TABLET ORAL DAILY
COMMUNITY
End: 2021-03-03 | Stop reason: SDUPTHER

## 2021-03-03 RX ORDER — ASPIRIN 325 MG
325 TABLET ORAL DAILY
Qty: 90 TAB | Refills: 1 | Status: SHIPPED | OUTPATIENT
Start: 2021-03-03 | End: 2022-08-26

## 2021-03-03 RX ORDER — BUMETANIDE 1 MG/1
1 TABLET ORAL 2 TIMES DAILY
COMMUNITY
End: 2021-05-05

## 2021-03-03 RX ORDER — CARVEDILOL 6.25 MG/1
6.25 TABLET ORAL 2 TIMES DAILY WITH MEALS
COMMUNITY
End: 2021-06-08 | Stop reason: DRUGHIGH

## 2021-03-03 NOTE — PROGRESS NOTES
Accessed [facility], printed available records as requested. No cath report in chart. Printed A1C, CTA chest, ECHO and op report. In Dr. Bella Jacinto mail folder.

## 2021-03-03 NOTE — PROGRESS NOTES
Jay Salazar is a 56 y.o. male who was seen in clinic today (3/3/2021) for a Transitional Care Appointment          Assessment & Plan:   Diagnoses and all orders for this visit:    1. Coronary artery disease involving native coronary artery of native heart without angina pectoris- new diagnosis, asymptomatic. BP is low today but improved at home (multiple med changes), lipids are well controlled. Continue: current plan as outlined by specialist.  Lisinopril replaced trandolapril. ASA, Amiodorone, and coreg are new. Lipitor replaced lovstatin. -     aspirin (ASPIRIN) 325 mg tablet; Take 1 Tab by mouth daily. 2. Diabetes mellitus with microalbuminuria ([facility]- unclear control, will see if A1c was done in hospital, if not will ask rheumatology to check. Info for podiatry provided. 3. Urinary frequency- new dx, no signs of infection, likely related to fluid overload and medications but sounds like there has been some component of BPH prior to all this. Maybe exacerbated by dodge and anesthesia. Will defer starting BPH meds at this time due to low BP but will need to revisit. -     AMB POC URINALYSIS DIP STICK AUTO W/O MICRO    4. Lower extremity edema- this is a new problem, symptoms are: fluctuating, differential dx reviewed with the patient, favor fluid overload. Nothing to suggest kdney issues. Nothing in d/c summary of TTE or EF. Will get cath report and any TTE to review. Continue diuretics per specialist.       5. Impacted cerumen of right ear  -     REMOVAL IMPACTED CERUMEN IRRIGATION/LVG UNILAT    6. Hospital discharge follow-up  -     MD DISCHARGE MEDS RECONCILED W/ CURRENT OUTPATIENT MED LIST      Follow-up and Dispositions    · Return in about 3 months (around 6/3/2021) for FULL PHYSICAL - 30 minutes.   He has [address] scheduled for later this month that was cancelled and will be rescheduled       Subjective & Objective:   Eliz Ann was seen today for Hospital Follow Up      Transition Care Management:    Date of office visit: 3/3/2021   Date of admission: 2/5/21  Date of discharge: 2/15/21  Hospital: [facility]    Call initiated w/i 2 business dates of discharge: No         Aryan Elizalde is a 56 y.o. male presenting today for follow-up after hospital discharge. This encounter and supporting documentation was reviewed if available. Medication reconciliation was performed today. The main problem requiring admission was SOB. No chest pain. He underwent cardiac cath, no intervention, and referred for bypass. Underwent 3 vessel bypass on 2/8/21. Complications during admission: none. Admitting symptoms have: resolved    Interval history/Current status. He did see surgeon but has not seen cardiologist yet. He was on lasix for a week due to swelling in the ankles. Brother-in-law reports no CHF, just overdid it with IVF. Mentally he is not as fast.  He was confused when he returned home, but is improving. Also noticed some tremor in the R hand w/ writing. Asking about a podiatrist to trim his nails. He is urinating more then he was in the past.  Asking if this is a prostate issue. Did have a catheter after surgery. Asking about ear wax, he is having issues w/ cleaning. Endocrine Review  He is seen for diabetes. Testing: is performed regularly, fasting minimum reading is 73, maximum reading is 124, and average reading is 90's, patient did not bring glucose log to this visit. He reports medication compliance: compliant all of the time. Medication side effects: none. Diabetic diet compliance: compliant all of the time. Lab review: labs reviewed and discussed with patient. Prior to Admission medications    Medication Sig Start Date End Date Taking? Authorizing Provider   aspirin (ASPIRIN) 325 mg tablet Take 325 mg by mouth daily. Yes Provider, Historical   amiodarone (CORDARONE) 200 mg tablet Take 200 mg by mouth two (2) times a day.    Yes Provider, Historical atorvastatin (LIPITOR) 80 mg tablet Take 80 mg by mouth daily. Yes Provider, Historical   bumetanide (BUMEX) 1 mg tablet Take 1 mg by mouth two (2) times a day. Yes Provider, Historical   carvediloL (COREG) 6.25 mg tablet Take 6.25 mg by mouth two (2) times daily (with meals). Yes Provider, Historical   lisinopriL (PRINIVIL, ZESTRIL) 5 mg tablet Take 2.5 mg by mouth daily. Yes Provider, Historical   potassium chloride SR (K-TAB) 20 mEq tablet Take 20 mEq by mouth two (2) times a day. Yes Provider, Historical   glipiZIDE SR (GLUCOTROL XL) 5 mg CR tablet TAKE 1 TABLET BY MOUTH EVERY DAY 1/18/21  Yes Anurag Bashir MD   Janumet  mg per tablet TAKE 1 TABLET BY MOUTH TWICE A DAY WITH MEALS 11/4/20  Yes Anurag Bashir MD   glucose blood VI test strips (ONETOUCH ULTRA BLUE TEST STRIP) strip TEST DAILY. Dx:  E11.29 5/29/18  Yes Anurag Bashir MD   albuterol (PROVENTIL HFA, VENTOLIN HFA, PROAIR HFA) 90 mcg/actuation inhaler Take 1-2 Puffs by inhalation every six (6) hours as needed for Shortness of Breath (coughing). 7/21/17  Yes Anurag Bashir MD   Lancets misc Test BS daily. DX:250.00 2/22/17  Yes Anurag Bashir MD   Blood-Glucose Meter monitoring kit Test BS daily. DX:250.00 7/28/15  Yes Anurag Bashir MD   predniSONE (DELTASONE) 10 mg tablet 4 tabs daily x 3 days, then 3 tabs daily x 3 days, and then 2 tabs daily x 10 days. 2/3/21 3/3/21  Anurag Bashir MD   lovastatin (MEVACOR) 40 mg tablet TAKE 1 TABLET BY MOUTH EVERY DAY 1/19/21 3/3/21  Anurag Bashir MD   trandolapriL (MAVIK) 2 mg tablet TAKE 1 TABLET BY MOUTH EVERY DAY 10/26/20 3/3/21  Anurag Bashir MD   multivitamin (ONE A DAY) tablet Take 1 Tab by mouth daily. 3/3/21  Provider, Historical           Review of Systems   Constitutional: Positive for malaise/fatigue and weight loss. Respiratory: Negative for cough and shortness of breath. Cardiovascular: Positive for leg swelling. Negative for chest pain and palpitations. Gastrointestinal: Negative for abdominal pain, constipation, diarrhea, heartburn, nausea and vomiting. Genitourinary: Positive for frequency. Negative for dysuria and urgency. Musculoskeletal: Negative for joint pain and myalgias. Neurological: Negative for dizziness and headaches. Psychiatric/Behavioral: Negative for depression. The patient is not nervous/anxious and does not have insomnia. Physical Exam  HENT:      Left Ear: Tympanic membrane and ear canal normal.      Ears:      Comments: Right ear is: 75% occluded with wet cerumen. Cardiovascular:      Rate and Rhythm: Regular rhythm. Heart sounds: Normal heart sounds. No murmur. Pulmonary:      Effort: Pulmonary effort is normal.      Breath sounds: Normal breath sounds. No wheezing or rales. Chest:      Comments: Surgical wound is clean and dry, some scab present, no erythema  Abdominal:      General: Bowel sounds are normal.      Palpations: Abdomen is soft. There is no mass. Tenderness: There is no abdominal tenderness. Musculoskeletal:      Right lower leg: Edema (4+) present. Left lower leg: Edema (4+) present.          Visit Vitals  BP (!) 97/59   Pulse 78   Temp 97.3 °F (36.3 °C) (Temporal)   Resp 18   Ht 5' 7.2\" (1.707 m)   Wt 180 lb (81.6 kg)   SpO2 99%   BMI 28.02 kg/m²         Isaiah Wing MD

## 2021-03-08 ENCOUNTER — OFFICE VISIT (OUTPATIENT)
Dept: RHEUMATOLOGY | Age: 61
End: 2021-03-08
Payer: MEDICARE

## 2021-03-08 ENCOUNTER — HOSPITAL ENCOUNTER (OUTPATIENT)
Dept: GENERAL RADIOLOGY | Age: 61
Discharge: HOME | End: 2021-03-08
Payer: MEDICARE

## 2021-03-08 VITALS
RESPIRATION RATE: 18 BRPM | SYSTOLIC BLOOD PRESSURE: 102 MMHG | HEIGHT: 68 IN | DIASTOLIC BLOOD PRESSURE: 68 MMHG | OXYGEN SATURATION: 99 % | TEMPERATURE: 97.8 F | HEART RATE: 75 BPM | WEIGHT: 176 LBS | BODY MASS INDEX: 26.67 KG/M2

## 2021-03-08 DIAGNOSIS — M19.90 CHRONIC INFLAMMATORY ARTHRITIS: Primary | ICD-10-CM

## 2021-03-08 DIAGNOSIS — Z79.899 ONGOING USE OF POSSIBLY TOXIC MEDICATION: ICD-10-CM

## 2021-03-08 DIAGNOSIS — M19.90 CHRONIC INFLAMMATORY ARTHRITIS: ICD-10-CM

## 2021-03-08 PROCEDURE — G8752 SYS BP LESS 140: HCPCS | Performed by: INTERNAL MEDICINE

## 2021-03-08 PROCEDURE — 99205 OFFICE O/P NEW HI 60 MIN: CPT | Performed by: INTERNAL MEDICINE

## 2021-03-08 PROCEDURE — G8754 DIAS BP LESS 90: HCPCS | Performed by: INTERNAL MEDICINE

## 2021-03-08 PROCEDURE — 73630 X-RAY EXAM OF FOOT: CPT

## 2021-03-08 PROCEDURE — 3017F COLORECTAL CA SCREEN DOC REV: CPT | Performed by: INTERNAL MEDICINE

## 2021-03-08 PROCEDURE — G8432 DEP SCR NOT DOC, RNG: HCPCS | Performed by: INTERNAL MEDICINE

## 2021-03-08 PROCEDURE — G8419 CALC BMI OUT NRM PARAM NOF/U: HCPCS | Performed by: INTERNAL MEDICINE

## 2021-03-08 PROCEDURE — 73130 X-RAY EXAM OF HAND: CPT

## 2021-03-08 PROCEDURE — G0463 HOSPITAL OUTPT CLINIC VISIT: HCPCS | Performed by: INTERNAL MEDICINE

## 2021-03-08 PROCEDURE — G8427 DOCREV CUR MEDS BY ELIG CLIN: HCPCS | Performed by: INTERNAL MEDICINE

## 2021-03-08 RX ORDER — HYDROXYCHLOROQUINE SULFATE 200 MG/1
400 TABLET, FILM COATED ORAL DAILY
Qty: 180 TAB | Refills: 1 | Status: SHIPPED | OUTPATIENT
Start: 2021-03-08 | End: 2021-05-05

## 2021-03-08 RX ORDER — PREDNISONE 5 MG/1
TABLET ORAL
Qty: 45 TAB | Refills: 2 | Status: SHIPPED | OUTPATIENT
Start: 2021-03-08 | End: 2021-06-03

## 2021-03-08 NOTE — PATIENT INSTRUCTIONS
1. It's possible this is rheumatoid arthritis-related, though we need to consider gout and RA-family autoimmune disease like RS3PE (remitting seronegative symmetric synovitis with pitting edema). 2. For now, let's start with prednisone 10mg daily x 7 days, then 5mg daily until next visit. 3. Start Plaquenil (hydroxychloroquine) one pill twice a day with food; Plaquenil will help with both autoimmune arthritis and crystal problems like gout. There is a 1/5000 risk of eye toxicity over the first five years of use with this. For this reason, we require you to follow up with ophthalmology at least annually while you take this. Let your ophthalmologist know you're starting this, and they'll arrange for screening for you. 4. Labs at your earliest convenience, any [address] [place] [address], any [facility] location is [employer] ([employer], [employer], [address], etc). 6. Return in 1 month to review results and determine next steps.

## 2021-03-08 NOTE — LETTER
3/15/2021 Patient: [place] Lizama YOB: 1960 Date of Visit: 3/8/2021 Anne Frye MD 
[address] [address] [address] [employer] Dear Anne Frye MD, Thank you for referring Mr. Becca Francis to [address] [place] [address] for evaluation. My notes for this consultation are attached. If you have questions, please do not hesitate to call me. I look forward to following your patient along with you.  
 
 
Sincerely, 
 
Parmjit Schmidt MD

## 2021-03-08 NOTE — PROGRESS NOTES
REASON FOR VISIT:   Akash Ashley is a 56 y.o. male with history of CLL/SLL and coronary artery disease s/p recent CABG who is being referred to [facility] Rheumatology at the request of Dr. Sandie Stevenson, regarding a diagnosis of bilateral hand swelling. HISTORY OF PRESENT ILLNESS  Patient accompanied today by his brother-in-law, who provides corroborating history. Dr Sandie Stevenson' notes and [facility] discharge summary reviewed. Since November, started getting hand sensitivity and swelling, redness over dorsal hands, MCPs, and stiffness in fingers. Since it appeared, swelling has persisted. Developed deep fissuring on 5th DIP, unable to remove rings, but not frankly painful. Medrol dosepack at the beginning of January resolved the pain and swelling but this returned within 1-2 weeks of stopping,    Underwent 3V CABG at [facility] on 2/15 when he presented for chest pain and was found to have troponin of 10 with 3-V severe CAD, now improving with resolving LE swelling after surgery. Seems more lethargic since his surgery. Intellectual disability since a childhood ischemic brain injury in the course of a pneumonia. Plays video games most of the day. Walks around his trailer for exercise. Seems lower energy than frankly dyspneic. REVIEW OF SYSTEMS  A comprehensive review of systems was negative except for that written in the HPI. A 10-point review of systems is per the new patient questionnaire, which has been reviewed extensively and scanned into the electronic medical record for future reference. Review of systems is as above and is otherwise negative. ALLERGIES  Pcn [penicillins]    MEDICATIONS  Current Outpatient Medications   Medication Sig    amiodarone (CORDARONE) 200 mg tablet Take 200 mg by mouth two (2) times a day.  atorvastatin (LIPITOR) 80 mg tablet Take 80 mg by mouth daily.  bumetanide (BUMEX) 1 mg tablet Take 1 mg by mouth two (2) times a day.     carvediloL (COREG) 6.25 mg tablet Take 6.25 mg by mouth two (2) times daily (with meals).  lisinopriL (PRINIVIL, ZESTRIL) 5 mg tablet Take 2.5 mg by mouth daily.  potassium chloride SR (K-TAB) 20 mEq tablet Take 20 mEq by mouth two (2) times a day.  aspirin (ASPIRIN) 325 mg tablet Take 1 Tab by mouth daily.  glipiZIDE SR (GLUCOTROL XL) 5 mg CR tablet TAKE 1 TABLET BY MOUTH EVERY DAY    Janumet  mg per tablet TAKE 1 TABLET BY MOUTH TWICE A DAY WITH MEALS    glucose blood VI test strips (ONETOUCH ULTRA BLUE TEST STRIP) strip TEST DAILY. Dx:  E11.29    albuterol (PROVENTIL HFA, VENTOLIN HFA, PROAIR HFA) 90 mcg/actuation inhaler Take 1-2 Puffs by inhalation every six (6) hours as needed for Shortness of Breath (coughing).  Lancets misc Test BS daily. DX:250.00    Blood-Glucose Meter monitoring kit Test BS daily. DX:250.00     No current facility-administered medications for this visit. 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 SOCIAL HISTORY  He  reports that he has never smoked. He has never used smokeless tobacco. He reports that he does not drink alcohol or use drugs. Social History     Social History Narrative    Not on file       DATA  Visit Vitals  /68 (BP 1 Location: Right arm, BP Patient Position: Sitting)   Pulse 75   Temp 97.8 °F (36.6 °C) (Oral)   Resp 18   Ht 5' 7.5\" (1.715 m)   Wt 176 lb (79.8 kg)   SpO2 99%   BMI 27.16 kg/m²    Body mass index is 27.16 kg/m². No flowsheet data found.   General:  The patient is well developed, well nourished, alert, and in no apparent distress. Eyes: Sclera are anicteric. No conjunctival injection. HEENT:  Oropharynx is clear. No oral ulcers. Adequate salivary pooling. No cervical or supraclavicular lymphadenopathy. Lungs:  Clear to auscultation bilaterally, without wheeze or stridor. Normal respiratory effort. Cor: Well-healed sternotomy scar. Regular rate and rhythm. No murmur rub or gallop. Abdomen: Soft, non-tender, without hepatomegaly or masses. Extremities: No calf tenderness. 2+ B LE edema to mid-calf. Warm and well perfused. Skin:  Mild proximal nailfold rarification. Erythema and xerosis over MCPs without gallito Gottron's. No tophi. Neuro: Shuffling gait. Hard of hearing, responds to simple questions appropriately, follows 2-step directions. Musculoskeletal:    A comprehensive musculoskeletal exam was performed for all joints of each upper and lower extremity and assessed for swelling, tenderness and range of motion. Results are documented as below:  1+ edema bilateral hands, no evident tenderness. No evidence of synovitis in the small joints of the hands, wrists, shoulders, elbows, hips, knees or ankles. Labs:  1/12/21: 14.3. 3ETA 1.10 [<0.2], neg RF and CCP, neg CLAY IFA. ESR 4.  8/31/20: CMP WNL, CBC WNL    Imaging:  None relevant    ASSESSMENT AND PLAN  Mr. Wendi Denise is a 56 y.o. male who presents for evaluation of corticosteroid-responsive bilateral hand and foot painless swelling with an elevated 14-3-3eta protein level, suggestive of early rheumatoid arthritis, though the lack of tenderness or gallito synovitis is unusual (14-3-3eta is more sensitive than specific). Fixed presence since it emerged and lack of prominent pain argue against gout, erythromelalgia, and RS3PE (remitting seronegative symmetrical synovitis with pitting edema).  Appearance is atypical for dermatomyositis, but favor myositis panel and biopsy with dermatology to distinguish interface dermatitis from an edematous eczematous process if the first round of workup is unrevealing. Reviewed short-term options, and favor for now starting with a trial of lower-dose prednisone and Plaquenil (hydroxychloroquine) which would be (slowly) effective for crystal disease, inflammatory arthritis, some eczematous conditions, and dermatomyositis. Reviewed the low but present risk of arrhythmia with this particularly given his recent NSTEMI, and the need for ophthalmologic screening. Obtaining baseline plain films, if unrevealing with follow up with MRI to rule in a more tenosynovial or interstitial process. 1. Chronic inflammatory arthritis  - C REACTIVE PROTEIN, QT; Future  - CBC WITH AUTOMATED DIFF; Future  - METABOLIC PANEL, COMPREHENSIVE; Future  - SED RATE (ESR); Future  - URIC ACID; Future  - XR HAND RT MIN 3 V; Future  - XR HAND LT MIN 3 V; Future  - XR FOOT RT MIN 3 V; Future  - XR FOOT LT MIN 3 V; Future  - CHRONIC HEPATITIS PANEL; Future  - QUANTIFERON-TB GOLD PLUS; Future  - C REACTIVE PROTEIN, QT  - CBC WITH AUTOMATED DIFF  - METABOLIC PANEL, COMPREHENSIVE  - SED RATE (ESR)  - URIC ACID  - CHRONIC HEPATITIS PANEL  - QUANTIFERON-TB GOLD PLUS  - hydrOXYchloroQUINE (PLAQUENIL) 200 mg tablet; Take 2 Tabs by mouth daily. Dispense: 180 Tab; Refill: 1  - predniSONE (DELTASONE) 5 mg tablet; Take 2 tabs PO daily with breakfast for 7 days, then continue 1 tab PO daily until next visit. Dispense: 45 Tab; Refill: 2    2. Ongoing use of possibly toxic medication  - CHRONIC HEPATITIS PANEL; Future  - QUANTIFERON-TB GOLD PLUS; Future  - CHRONIC HEPATITIS PANEL  - QUANTIFERON-TB GOLD PLUS  - predniSONE (DELTASONE) 5 mg tablet; Take 2 tabs PO daily with breakfast for 7 days, then continue 1 tab PO daily until next visit. Dispense: 45 Tab; Refill: 2    Patient Instructions   1.  It's possible this is rheumatoid arthritis-related, though we need to consider gout and RA-family autoimmune disease like RS3PE (remitting seronegative symmetric synovitis with pitting edema). 2. For now, let's start with prednisone 10mg daily x 7 days, then 5mg daily until next visit. 3. Start Plaquenil (hydroxychloroquine) one pill twice a day with food; Plaquenil will help with both autoimmune arthritis and crystal problems like gout. There is a 1/5000 risk of eye toxicity over the first five years of use with this. For this reason, we require you to follow up with ophthalmology at least annually while you take this. Let your ophthalmologist know you're starting this, and they'll arrange for screening for you. 4. Labs at your earliest convenience, any [address], any [facility] location is [place] ([employer], [facility], [address] [place] [address], etc). 6. Return in 1 month to review results and determine next steps. Orders Placed This Encounter    QUANTIFERON-TB GOLD PLUS    XR HAND RT MIN 3 V    XR HAND LT MIN 3 V    XR FOOT RT MIN 3 V    XR FOOT LT MIN 3 V    C REACTIVE PROTEIN, QT    CBC WITH AUTOMATED DIFF    METABOLIC PANEL, COMPREHENSIVE    SED RATE (ESR)    URIC ACID    CHRONIC HEPATITIS PANEL    COMMENT    hydrOXYchloroQUINE (PLAQUENIL) 200 mg tablet    predniSONE (DELTASONE) 5 mg tablet       Medications: I am having Diego Blind maintain his Blood-Glucose Meter, lancets, albuterol, glucose blood VI test strips, Janumet, glipiZIDE SR, amiodarone, atorvastatin, bumetanide, carvediloL, lisinopriL, potassium chloride SR, and aspirin. Follow up: Return in about 4 weeks (around 4/5/2021).     Face to face time: 50 minutes  Note preparation and records review day of service: 15 minutes  Total provider time day of service: 65 minutes      Lenny Mcclelland MD    Adult Rheumatology   [facility] [address], [address] [place] [address] Road   Phone [phone]  Fax [phone]

## 2021-03-11 LAB
ALBUMIN SERPL-MCNC: 3.8 G/DL (ref 3.8–4.8)
ALBUMIN/GLOB SERPL: 1.8 {RATIO} (ref 1.2–2.2)
ALP SERPL-CCNC: 161 IU/L (ref 39–117)
ALT SERPL-CCNC: 21 IU/L (ref 0–44)
AST SERPL-CCNC: 22 IU/L (ref 0–40)
BASOPHILS # BLD AUTO: 0 X10E3/UL (ref 0–0.2)
BASOPHILS NFR BLD AUTO: 0 %
BILIRUB SERPL-MCNC: 0.4 MG/DL (ref 0–1.2)
BUN SERPL-MCNC: 25 MG/DL (ref 8–27)
BUN/CREAT SERPL: 27 (ref 10–24)
CALCIUM SERPL-MCNC: 9.4 MG/DL (ref 8.6–10.2)
CHLORIDE SERPL-SCNC: 101 MMOL/L (ref 96–106)
CO2 SERPL-SCNC: 23 MMOL/L (ref 20–29)
COMMENT, 144067: NORMAL
CREAT SERPL-MCNC: 0.94 MG/DL (ref 0.76–1.27)
CRP SERPL-MCNC: 3 MG/L (ref 0–10)
EOSINOPHIL # BLD AUTO: 0.2 X10E3/UL (ref 0–0.4)
EOSINOPHIL NFR BLD AUTO: 2 %
ERYTHROCYTE [DISTWIDTH] IN BLOOD BY AUTOMATED COUNT: 14.2 % (ref 11.6–15.4)
ERYTHROCYTE [SEDIMENTATION RATE] IN BLOOD BY WESTERGREN METHOD: 7 MM/HR (ref 0–30)
GAMMA INTERFERON BACKGROUND BLD IA-ACNC: 0.02 IU/ML
GLOBULIN SER CALC-MCNC: 2.1 G/DL (ref 1.5–4.5)
GLUCOSE SERPL-MCNC: 116 MG/DL (ref 65–99)
HBV CORE AB SERPL QL IA: NEGATIVE
HBV CORE IGM SERPL QL IA: NEGATIVE
HBV E AB SERPL QL IA: NEGATIVE
HBV E AG SERPL QL IA: NEGATIVE
HBV SURFACE AB SER QL: NON REACTIVE
HBV SURFACE AG SERPL QL IA: NEGATIVE
HCT VFR BLD AUTO: 32.7 % (ref 37.5–51)
HCV AB S/CO SERPL IA: <0.1 S/CO RATIO (ref 0–0.9)
HGB BLD-MCNC: 10.5 G/DL (ref 13–17.7)
IMM GRANULOCYTES # BLD AUTO: 0 X10E3/UL (ref 0–0.1)
IMM GRANULOCYTES NFR BLD AUTO: 0 %
LYMPHOCYTES # BLD AUTO: 2.2 X10E3/UL (ref 0.7–3.1)
LYMPHOCYTES NFR BLD AUTO: 24 %
M TB IFN-G BLD-IMP: NEGATIVE
M TB IFN-G CD4+ BCKGRND COR BLD-ACNC: 0.03 IU/ML
MCH RBC QN AUTO: 27.8 PG (ref 26.6–33)
MCHC RBC AUTO-ENTMCNC: 32.1 G/DL (ref 31.5–35.7)
MCV RBC AUTO: 87 FL (ref 79–97)
MITOGEN IGNF BLD-ACNC: 5.51 IU/ML
MONOCYTES # BLD AUTO: 0.7 X10E3/UL (ref 0.1–0.9)
MONOCYTES NFR BLD AUTO: 8 %
NEUTROPHILS # BLD AUTO: 5.8 X10E3/UL (ref 1.4–7)
NEUTROPHILS NFR BLD AUTO: 66 %
PLATELET # BLD AUTO: 210 X10E3/UL (ref 150–450)
POTASSIUM SERPL-SCNC: 5.6 MMOL/L (ref 3.5–5.2)
PROT SERPL-MCNC: 5.9 G/DL (ref 6–8.5)
QUANTIFERON INCUBATION, QF1T: NORMAL
QUANTIFERON TB2 AG: 0.02 IU/ML
RBC # BLD AUTO: 3.78 X10E6/UL (ref 4.14–5.8)
SERVICE CMNT-IMP: NORMAL
SODIUM SERPL-SCNC: 138 MMOL/L (ref 134–144)
URATE SERPL-MCNC: 5.7 MG/DL (ref 3.8–8.4)
WBC # BLD AUTO: 8.9 X10E3/UL (ref 3.4–10.8)

## 2021-03-15 ENCOUNTER — PATIENT MESSAGE (OUTPATIENT)
Dept: RHEUMATOLOGY | Age: 61
End: 2021-03-15

## 2021-03-18 ENCOUNTER — TELEPHONE (OUTPATIENT)
Dept: INTERNAL MEDICINE CLINIC | Age: 61
End: 2021-03-18

## 2021-03-18 NOTE — TELEPHONE ENCOUNTER
[facility] [phone]   cedric robertson      Calling to let dr Yessy Barroso know that pt had surgery on 2/8/21 and is having some edema in both legs, wheezing on rt side , history of MI.   Has appt with cardiologist later today and she will have him listen to pt's chest.

## 2021-03-19 NOTE — TELEPHONE ENCOUNTER
Previously discussed QT changes with Plaquenil with patient and his brother in law. Should be low risk.

## 2021-03-19 NOTE — TELEPHONE ENCOUNTER
Vale Alvarado LPN 6/05/2607 5:59 PM EDT    Pharmacy states there is a contraindication with Plaquenil to Janumet and amiodarone.  ----- Message -----  From: Carol Bonds MD  Sent: 3/17/2021 1:06 PM EDT  To: Kaylene Ramirez LPN  Subject: FW:labs, imaging, and next steps     Could you reach out to his pharmacy to find out what the problem was with the Plaquenil? If we need prior auth then I'll reorder through [address] .  ----- Message -----  From: Kelsey Rojas RN  Sent: 3/16/2021 12:51 PM EDT  To: Mando Gibbs MD  Subject: FW:labs, imaging, and next steps       ----- Message -----  From: Chloe Pepe  Sent: 3/16/2021 12:35 PM EDT  To: [facility] Nurse Pool  Subject: RE:labs, imaging, and next steps     Hello,  The left hand is not swollen but is still very red. The Plaquenil was never started as the pharmacy said they were contacting your office for clarification. We can try to have an MRI done, hopefully he will be able to stay still enough. He has an appointment with his cardiologist today so we will ask about the potassium.     Thank you

## 2021-03-24 ENCOUNTER — HOSPITAL ENCOUNTER (OUTPATIENT)
Dept: MRI IMAGING | Age: 61
Discharge: HOME | End: 2021-03-24
Attending: INTERNAL MEDICINE
Payer: MEDICARE

## 2021-03-24 DIAGNOSIS — M19.90 CHRONIC INFLAMMATORY ARTHRITIS: ICD-10-CM

## 2021-03-24 PROCEDURE — 73218 MRI UPPER EXTREMITY W/O DYE: CPT

## 2021-04-01 LAB — EF %, EXTERNAL: NORMAL

## 2021-04-18 RX ORDER — GLIPIZIDE 5 MG/1
TABLET, FILM COATED, EXTENDED RELEASE ORAL
Qty: 90 TAB | Refills: 0 | Status: SHIPPED | OUTPATIENT
Start: 2021-04-18 | End: 2021-07-12

## 2021-04-19 ENCOUNTER — OFFICE VISIT (OUTPATIENT)
Dept: RHEUMATOLOGY | Age: 61
End: 2021-04-19
Payer: MEDICARE

## 2021-04-19 VITALS
DIASTOLIC BLOOD PRESSURE: 50 MMHG | HEIGHT: 68 IN | HEART RATE: 68 BPM | SYSTOLIC BLOOD PRESSURE: 78 MMHG | RESPIRATION RATE: 18 BRPM | BODY MASS INDEX: 26.37 KG/M2 | WEIGHT: 174 LBS | TEMPERATURE: 96.9 F

## 2021-04-19 DIAGNOSIS — R74.8 ELEVATED ALKALINE PHOSPHATASE LEVEL: ICD-10-CM

## 2021-04-19 DIAGNOSIS — M79.89 BILATERAL HAND SWELLING: ICD-10-CM

## 2021-04-19 DIAGNOSIS — M54.2 CERVICALGIA: Primary | ICD-10-CM

## 2021-04-19 LAB — CREATININE, EXTERNAL: 0.89

## 2021-04-19 PROCEDURE — G8510 SCR DEP NEG, NO PLAN REQD: HCPCS | Performed by: INTERNAL MEDICINE

## 2021-04-19 PROCEDURE — 3017F COLORECTAL CA SCREEN DOC REV: CPT | Performed by: INTERNAL MEDICINE

## 2021-04-19 PROCEDURE — 99215 OFFICE O/P EST HI 40 MIN: CPT | Performed by: INTERNAL MEDICINE

## 2021-04-19 PROCEDURE — G8427 DOCREV CUR MEDS BY ELIG CLIN: HCPCS | Performed by: INTERNAL MEDICINE

## 2021-04-19 PROCEDURE — G0463 HOSPITAL OUTPT CLINIC VISIT: HCPCS | Performed by: INTERNAL MEDICINE

## 2021-04-19 PROCEDURE — G8754 DIAS BP LESS 90: HCPCS | Performed by: INTERNAL MEDICINE

## 2021-04-19 PROCEDURE — G8419 CALC BMI OUT NRM PARAM NOF/U: HCPCS | Performed by: INTERNAL MEDICINE

## 2021-04-19 PROCEDURE — G8752 SYS BP LESS 140: HCPCS | Performed by: INTERNAL MEDICINE

## 2021-04-19 RX ORDER — FUROSEMIDE 40 MG/1
TABLET ORAL
COMMUNITY
Start: 2021-04-01 | End: 2021-05-05

## 2021-04-19 NOTE — PATIENT INSTRUCTIONS
1. First order of business is getting BP up, quickly--go straight to [facility], I'll let them know my concerns. 2. Once that's straightened out, labs from [facility] and neck xrays from any [facility] location. 3. Continue prednisone 5mg daily for now. Well come up with a better long-term solution once his BP is better and we have the above information. 4. His MRI looked good in terms of the joints, which makes arthritis less likely--this is beginning to look more like complex regional pain syndrome (CRPS). 5. Return in 2-4 weeks, virtual visit is OK.

## 2021-04-19 NOTE — PROGRESS NOTES
REASON FOR VISIT:   Clementine Pineda is a 59 y.o. male with history of CLL/SLL and coronary artery disease s/p recent CABG who is returning for followup of episodic swelling of the hands and feet suspicious for CRPS. HISTORY OF PRESENT ILLNESS  Most of history obtained from patient's brother in law; answers verified with patient who has intellectual disability and hard of hearing. Never did get started on Plaquenil (hydroxychloroquine), not clear whether was pharmacy-driven QTc concerns, or insurance auth for underlying diagnosis. Overall continues to do well without return of severe swelling, prednisone now at 5mg daily. Hand MRI had been negative for synovitis, labs from last month largely unremarkable with normal CCP, RF, ESR, CRP. K slightly elevated 5.6. Per pt diuretics have been changed, now taking furosemide 40mg bid, was previously taking bumex; weight is down about 5 pounds from last month. Reports echo last week had \"normalized,\" from EF 25-30% at the time of his CABG in February. BP today 70's/50s, repeat checks on both arms. Pt denies chest pain, palpitations, or lightheadedness. No recent n/v/d. No interval development of rashes, breathing complaints, or new joint swelling. REVIEW OF SYSTEMS  A comprehensive review of systems was negative except for that written in the HPI. A 10-point review of systems is per the new patient questionnaire, which has been reviewed extensively and scanned into the electronic medical record for future reference. Review of systems is as above and is otherwise negative. ALLERGIES  Pcn [penicillins]    MEDICATIONS  Current Outpatient Medications   Medication Sig    furosemide (LASIX) 40 mg tablet TAKE 1 TABLET BY MOUTH TWICE A DAY    glipiZIDE SR (GLUCOTROL XL) 5 mg CR tablet TAKE 1 TABLET BY MOUTH EVERY DAY    predniSONE (DELTASONE) 5 mg tablet Take 2 tabs PO daily with breakfast for 7 days, then continue 1 tab PO daily until next visit.     atorvastatin (LIPITOR) 80 mg tablet Take 80 mg by mouth daily.  carvediloL (COREG) 6.25 mg tablet Take 6.25 mg by mouth two (2) times daily (with meals).  lisinopriL (PRINIVIL, ZESTRIL) 5 mg tablet Take 2.5 mg by mouth daily.  potassium chloride SR (K-TAB) 20 mEq tablet Take 20 mEq by mouth two (2) times a day.  aspirin (ASPIRIN) 325 mg tablet Take 1 Tab by mouth daily.  Janumet  mg per tablet TAKE 1 TABLET BY MOUTH TWICE A DAY WITH MEALS    glucose blood VI test strips (ONETOUCH ULTRA BLUE TEST STRIP) strip TEST DAILY. Dx:  E11.29    Lancets misc Test BS daily. DX:250.00    Blood-Glucose Meter monitoring kit Test BS daily. DX:250.00    hydrOXYchloroQUINE (PLAQUENIL) 200 mg tablet Take 2 Tabs by mouth daily.  amiodarone (CORDARONE) 200 mg tablet Take 200 mg by mouth two (2) times a day.  bumetanide (BUMEX) 1 mg tablet Take 1 mg by mouth two (2) times a day.  albuterol (PROVENTIL HFA, VENTOLIN HFA, PROAIR HFA) 90 mcg/actuation inhaler Take 1-2 Puffs by inhalation every six (6) hours as needed for Shortness of Breath (coughing). No current facility-administered medications for this visit. PAST MEDICAL HISTORY  Past Medical History:   Diagnosis Date    [facility]    Hematologist: Dr Marycruz Lopez [facility])     Hyperlipidemia     Hypertension     [facility] 6[facility]17[facility]2014    1 yr old, developed PNA, was hypoxic, since then has had issues        FAMILY HISTORY  family history includes COPD in his mother; Cancer in his father; Diabetes in his brother; High Cholesterol in his brother; Hypertension in his brother; No Known Problems in his sister. SOCIAL HISTORY  He  reports that he has never smoked. He has never used smokeless tobacco. He reports that he does not drink alcohol or use drugs. Social History     Social History Narrative    Not on file   intellectual disability since PNA at 1yo. Disabled.  Enjoys playing video games     DATA  Visit Vitals  BP (!) 78/50 (BP 1 Location: Right arm, BP Patient Position: Sitting) Comment (BP 1 Location): Dr. Milka Hinojosa notified   Pulse 68   Temp 96.9 °F (36.1 °C) (Oral)   Resp 18   Ht 5' 7.5\" (1.715 m)   Wt 174 lb (78.9 kg)   BMI 26.85 kg/m²    Body mass index is 26.85 kg/m². No flowsheet data found. General:  The patient is well developed, well nourished, alert, and in no apparent distress. Eyes: Sclera are anicteric. No conjunctival injection. HEENT:  Oropharynx is clear. No oral ulcers. Adequate salivary pooling. No cervical or supraclavicular lymphadenopathy. Lungs:  Clear to auscultation bilaterally, without wheeze or stridor. Normal respiratory effort. Cor: Today irregularly irregular, normal rate. No murmur rub or gallop. Well-healed sternotomy scar. Abdomen: Soft, non-tender, without hepatomegaly or masses. Extremities: No calf tenderness. 2+ left > 1+ right LE edema to mid-calf. Cool peripherally. Skin:  Mild proximal nailfold rarification. Erythema and xerosis over MCPs without Gottron's. No tophi. Neuro: Shuffling gait. Hard of hearing, responds to simple questions appropriately, follows 2-step directions. Musculoskeletal:    A comprehensive musculoskeletal exam was performed for all joints of each upper and lower extremity and assessed for swelling, tenderness and range of motion. Results are documented as below:  Kyphotic, does not extend neck beyond horizontal, limited rotation to 60deg each direction. 1+ edema bilateral hands, no evident tenderness, no gallito synovitis. No evidence of synovitis in the small joints of the hands, wrists, shoulders, elbows, hips, knees or ankles. Labs:  3/8/21: WBC 8.9, Hgb 10.5, Plt 210; Cr 0.94, K 5.6; Tbili 0.4, AST 22, ALT 21, AlkP 161, uric acid 5.6;   1/12/21: 14.3. 3ETA 1.10 [<0.2], neg RF and CCP, neg CLAY IFA.  ESR 4.  8/31/20: CMP WNL, CBC WNL    Imaging:  3/24/21: MR left hand: No acute abnormality is evident. 3/8/21: XR feet: No acute process. Nonspecific dorsal soft tissue swelling. Bilateral Achilles and plantar spurs as described  3/8/21: XR hands: No erosions, chondrocalcinosis, or e/o inflammatory arthritis  2/6/21: Echo: EF 25-30%, mild MVR      Pathology:  10/11/18: Chest punch biopsy: DIF negative  3/7/16 Bone marrow: Bone marrow, left iliac crest, biopsy and aspiration:  Chronic lymphocytic leukemia/small lymphocytic lymphoma (CLL/SLL, ~80%); no evidence of large  cell transformation  - flow cytometry demonstrates a monoclonal B-cell population with the following phenotype:  CD45+, CD19+, CD20+ (dim), CD5+, CD10-, CD23+/-(dim, partial), FMC7-, CD38-, HLA DR+, sIg  lambda+ (dim)  - decreased stainable iron present without pathologic erythroid iron  -Await final SUMMARY report integrating pending FISH studies    ASSESSMENT AND PLAN  Mr. Jayda Lopez is a 59 y.o. male who presents for evaluation of corticosteroid-responsive bilateral hand and foot painless swelling around the time of his NSTEMI requiring 3V CABG, with an elevated 14-3-3eta protein level but no evidence of synovitis. Suspect he has cold CRPS secondary to his MI +/- significant cervical stenosis. First sending him urgently to ER to evaluate irregular HR and hypotension, signed patient out to [facility] triage. Seems likely that the patient is not communicating or experiencing pain as typically expected in CRPS. Jay Jay Kick prednisone not an ideal treatment though unusual that the swelling is at least in part responsive to low-dose prednisone; favor referral to pain management for any thoughts regarding interventions likely to improve the swelling. Jay Jay Kick NSAIDs relatively contraindicated with recent heart history, and ultimately prednisone 5mg may be the lowest-risk effective alternative. Checking neck xray and fractionating elevated AlkPhos, which may reflect accelerated bone resorption peripherally.     1. Cervicalgia  - XR SPINE CERV W OBL/FLEX/EXT MIN 6 V COMP; Future    2. Elevated alkaline phosphatase level  - ALKALINE PHOSPHATASE, BONE; Future  - GGT; Future  - ALKALINE PHOSPHATASE, BONE  - GGT    3. Bilateral hand swelling  - XR SPINE CERV W OBL/FLEX/EXT MIN 6 V COMP; Future      Patient Instructions   1. First order of business is getting BP up, quickly--go straight to [facility], I'll let them know my concerns. 2. Once that's straightened out, labs from [facility] and neck xrays from any [facility] location. 3. Continue prednisone 5mg daily for now. Well come up with a better long-term solution once his BP is better and we have the above information. 4. His MRI looked good in terms of the joints, which makes arthritis less likely--this is beginning to look more like complex regional pain syndrome (CRPS). 5. Return in 2-4 weeks, virtual visit is OK. Orders Placed This Encounter    XR SPINE CERV W OBL/FLEX/EXT MIN 6 V COMP    ALKALINE PHOSPHATASE, BONE    GGT    furosemide (LASIX) 40 mg tablet       Medications: I am having Ward Allan maintain his Blood-Glucose Meter, lancets, albuterol, glucose blood VI test strips, Janumet, amiodarone, atorvastatin, bumetanide, carvediloL, lisinopriL, potassium chloride SR, aspirin, hydrOXYchloroQUINE, predniSONE, glipiZIDE SR, and furosemide. Follow up: No follow-ups on file.     Face to face time: 30 minutes  Note preparation and records review day of service: 15 minutes  Total provider time day of service: 45 minutes      Jessee Bojorquez MD    Adult Rheumatology   [address] [address], [address] [place] [address]   Phone [phone]  Fax [phone]

## 2021-04-19 NOTE — PROGRESS NOTES
Chief Complaint   Patient presents with    Arthritis     1. Have you been to the ER, urgent care clinic since your last visit? Hospitalized since your last visit? No    2. Have you seen or consulted any other health care providers outside of the [address] [place] [address] since your last visit? Include any pap smears or colon screening.  Yes Where: cardiologist

## 2021-04-19 NOTE — Clinical Note
MARY--sent him to [facility] from today's visit, would guess slow afib or VT with hypotension though asymptomatic. Swelling seems likely from CRPS, will get him tied in with Pain Management once this is sorted out.  Thanks! Dewayne Reyes [phone]

## 2021-04-19 NOTE — LETTER
4/19/2021 Patient: Nisha Hawkins YOB: 1960 Date of Visit: 4/19/2021 Ceci Goldsmith MD 
[address] [facility] [address] [address] [employer] Ric Fong MD 
[address] [place] [address] [address] [address] Fax: [phone] Dear MD Ric Domingo MD, Thank you for referring Mr. Lizbeth Monae to [address] [place] [address] for evaluation of likely CRPS. My notes for this consultation are attached. Please note today he was hypotensive to 70's/50s, with somewhat irregular HR though he appeared to be asymptomatic--we have referred him urgently to the [facility] ER. If you have questions, please do not hesitate to call me. I look forward to following your patient along with you. Sincerely, 
Taz Coto MD 
Cell: [phone]

## 2021-04-19 NOTE — PROGRESS NOTES
Pt's BP in right arm 75/43. Brother states patient had a trip by pass. States he had cardiac rehab this am and his   Bp was in the 90's/?. Dr. Twyla Alicia notified.

## 2021-04-28 ENCOUNTER — HOSPITAL ENCOUNTER (OUTPATIENT)
Dept: GENERAL RADIOLOGY | Age: 61
Discharge: HOME | End: 2021-04-28
Payer: MEDICARE

## 2021-04-28 ENCOUNTER — HOSPITAL ENCOUNTER (OUTPATIENT)
Dept: LAB | Age: 61
Discharge: HOME | End: 2021-04-28
Payer: MEDICARE

## 2021-04-28 DIAGNOSIS — M79.89 BILATERAL HAND SWELLING: ICD-10-CM

## 2021-04-28 DIAGNOSIS — M54.2 CERVICALGIA: ICD-10-CM

## 2021-04-28 PROCEDURE — 84080 ASSAY ALKALINE PHOSPHATASES: CPT

## 2021-04-28 PROCEDURE — 72052 X-RAY EXAM NECK SPINE 6/>VWS: CPT

## 2021-04-28 PROCEDURE — 36415 COLL VENOUS BLD VENIPUNCTURE: CPT

## 2021-04-28 PROCEDURE — 82977 ASSAY OF GGT: CPT

## 2021-05-01 LAB
ALP BONE SERPL-MCNC: 12.5 UG/L (ref 7.5–25.4)
GGT SERPL-CCNC: 10 IU/L (ref 0–65)

## 2021-05-05 ENCOUNTER — OFFICE VISIT (OUTPATIENT)
Dept: RHEUMATOLOGY | Age: 61
End: 2021-05-05
Payer: MEDICARE

## 2021-05-05 DIAGNOSIS — G90.513 COMPLEX REGIONAL PAIN SYNDROME TYPE 1 OF BOTH UPPER EXTREMITIES: Primary | ICD-10-CM

## 2021-05-05 PROCEDURE — 99214 OFFICE O/P EST MOD 30 MIN: CPT | Performed by: INTERNAL MEDICINE

## 2021-05-05 PROCEDURE — G8427 DOCREV CUR MEDS BY ELIG CLIN: HCPCS | Performed by: INTERNAL MEDICINE

## 2021-05-05 PROCEDURE — G0463 HOSPITAL OUTPT CLINIC VISIT: HCPCS | Performed by: INTERNAL MEDICINE

## 2021-05-05 PROCEDURE — G8432 DEP SCR NOT DOC, RNG: HCPCS | Performed by: INTERNAL MEDICINE

## 2021-05-05 PROCEDURE — G8756 NO BP MEASURE DOC: HCPCS | Performed by: INTERNAL MEDICINE

## 2021-05-05 PROCEDURE — 3017F COLORECTAL CA SCREEN DOC REV: CPT | Performed by: INTERNAL MEDICINE

## 2021-05-05 RX ORDER — LIDOCAINE 40 MG/G
CREAM TOPICAL
Qty: 45 G | Refills: 2 | Status: SHIPPED | OUTPATIENT
Start: 2021-05-05 | End: 2021-06-08

## 2021-05-05 NOTE — PATIENT INSTRUCTIONS
1. I think at this point the chances of the hand swelling being from inflammatory disease in the joints is quite low--this is most likely complex regional pain syndrome (CRPS) Type 1, a poorly understood syndrome of vascular and nerve dysregulation resulting in swelling, discoloration, and often pain in the extremities that can occur after surgeries and heart events. 2. Reduce prednisone to 2.5mg daily (or 5mg every other day) for 2-4 weeks, then OK to stop if swelling has not worsened. 3. I'm prescribing topical lidocaine to use on the hands as needed for redness, swelling, or discomfort. 4. Reach out to Dr. Juan Manuel Jaramillo to continue monitoring this going forward; I would recommend an EMG and Neurology referral for CRPS management if the swelling continues to be a problem.

## 2021-05-05 NOTE — PROGRESS NOTES
REASON FOR VISIT:   Hayden Carlin is a 59 y.o. male with history of CLL/SLL and coronary artery disease s/p recent CABG who is returning for telephone followup of episodic swelling of the hands and feet suspicious for CRPS. HISTORY OF PRESENT ILLNESS    After last visit went to ER, ECG and labs were normal. BP had normalized on arrival to [facility].    AlkPhos and GGT were normal. Neck xray showed anterior osteophytes c/w DISH, no advanced facet narrowing or spondylosis. Hands have been OK, per daughter patient remains comfortable-appearing (problem has always been more swelling than overt discomfort). Currently taking 5mg daily prednisone. Daughter concerned that he tends toward imbalance at baseline, would like to avoid gabapentin or Lyrica if possible. Disease History:  Most of history obtained from patient's brother in law; answers verified with patient who has intellectual disability and hard of hearing. Overall continues to do well without return of severe swelling, prednisone now at 5mg daily. Hand MRI had been negative for synovitis, labs from last month largely unremarkable with normal CCP, RF, ESR, CRP. K slightly elevated 5.6. Per pt diuretics have been changed, now taking furosemide 40mg bid, was previously taking bumex; weight is down about 5 pounds from last month. Echo has normalized to EF 55% from EF 25-30% at the time of his CABG in February. BP last visit 70's/50s, repeat checks on both arms with appropriately sized cuff. Pt denies chest pain, palpitations, or lightheadedness. No recent n/v/d. REVIEW OF SYSTEMS  A comprehensive review of systems was negative except for that written in the HPI. A 10-point review of systems is per the new patient questionnaire, which has been reviewed extensively and scanned into the electronic medical record for future reference. Review of systems is as above and is otherwise negative.     ALLERGIES  Pcn [penicillins]    MEDICATIONS  Current Outpatient Medications   Medication Sig    furosemide (LASIX) 40 mg tablet TAKE 1 TABLET BY MOUTH TWICE A DAY    glipiZIDE SR (GLUCOTROL XL) 5 mg CR tablet TAKE 1 TABLET BY MOUTH EVERY DAY    hydrOXYchloroQUINE (PLAQUENIL) 200 mg tablet Take 2 Tabs by mouth daily.  predniSONE (DELTASONE) 5 mg tablet Take 2 tabs PO daily with breakfast for 7 days, then continue 1 tab PO daily until next visit.  amiodarone (CORDARONE) 200 mg tablet Take 200 mg by mouth two (2) times a day.  atorvastatin (LIPITOR) 80 mg tablet Take 80 mg by mouth daily.  bumetanide (BUMEX) 1 mg tablet Take 1 mg by mouth two (2) times a day.  carvediloL (COREG) 6.25 mg tablet Take 6.25 mg by mouth two (2) times daily (with meals).  lisinopriL (PRINIVIL, ZESTRIL) 5 mg tablet Take 2.5 mg by mouth daily.  potassium chloride SR (K-TAB) 20 mEq tablet Take 20 mEq by mouth two (2) times a day.  aspirin (ASPIRIN) 325 mg tablet Take 1 Tab by mouth daily.  Janumet  mg per tablet TAKE 1 TABLET BY MOUTH TWICE A DAY WITH MEALS    glucose blood VI test strips (ONETOUCH ULTRA BLUE TEST STRIP) strip TEST DAILY. Dx:  E11.29    albuterol (PROVENTIL HFA, VENTOLIN HFA, PROAIR HFA) 90 mcg/actuation inhaler Take 1-2 Puffs by inhalation every six (6) hours as needed for Shortness of Breath (coughing).  Lancets misc Test BS daily. DX:250.00    Blood-Glucose Meter monitoring kit Test BS daily. DX:250.00     No current facility-administered medications for this visit.         PAST MEDICAL HISTORY  Past Medical History:   Diagnosis Date    CLL (chronic lymphocytic leukemia) ([facility] [address] 6/27/2014    Hematologist: Dr Elvis Low [facility])     Hyperlipidemia     Hypertension     Hypoxic brain damage ([facility] [address] 6/17/2014    1 yr old, developed PNA, was hypoxic, since then has had issues     Ill-defined condition        FAMILY HISTORY  family history includes COPD in his mother; Cancer in his father; Diabetes in his brother; High Cholesterol in his brother; Hypertension in his brother; No Known Problems in his sister. SOCIAL HISTORY  He  reports that he has never smoked. He has never used smokeless tobacco. He reports that he does not drink alcohol or use drugs. Social History     Social History Narrative    Not on file   intellectual disability since PNA at 1yo. Disabled. Enjoys playing video games     DATA  No vitals for telephone visit. Interval history obtained through patient's daughter. Labs:  21: GGT 10, tandem ostase 12.5 [7-25]  3/8/21: WBC 8.9, Hgb 10.5, Plt 210; Cr 0.94, K 5.6; Tbili 0.4, AST 22, ALT 21, AlkP 161, uric acid 5.6; ESR 7  21: 14.3. 3ETA 1.10 [<0.2], neg RF and CCP, neg CLAY IFA. ESR 4.  20: CMP WNL, CBC WNL    Imagin21 XR Cspine: Straightening of cervical spine. Ventral endplate spurring at all levels caudal to C2. Relative preservation of disc spaces. No fracture or subluxation. Patent bilateral bony neural foramina. Median sternotomy. 3/24/21: MR left hand: No acute abnormality is evident. 3/8/21: XR feet: No acute process. Nonspecific dorsal soft tissue swelling.   Bilateral Achilles and plantar spurs as described  3/8/21: XR hands: No erosions, chondrocalcinosis, or e/o inflammatory arthritis  21: Echo: EF 25-30%, mild MVR      Pathology:  10/11/18: Chest punch biopsy: DIF negative  3/7/16 Bone marrow: Bone marrow, left iliac crest, biopsy and aspiration:  Chronic lymphocytic leukemia/small lymphocytic lymphoma (CLL/SLL, ~80%); no evidence of large  cell transformation  - flow cytometry demonstrates a monoclonal B-cell population with the following phenotype:  CD45+, CD19+, CD20+ (dim), CD5+, CD10-, CD23+/-(dim, partial), FMC7-, CD38-, HLA DR+, sIg  lambda+ (dim)  - decreased stainable iron present without pathologic erythroid iron  -Await final SUMMARY report integrating pending FISH studies    ASSESSMENT AND PLAN  Mr. Kevyn Olvera is a 59 y.o. male who presents for evaluation of corticosteroid-responsive bilateral hand and foot painless swelling around the time of his NSTEMI requiring 3V CABG, with an elevated 14-3-3eta protein level but no evidence of synovitis. Suspect he has cold CRPS secondary to his MI. Fortunately cardiac function seems to be improving, and he is doing well on low-dose prednisone. Will cautiously taper prednisone off over the next month. Prescribing topical lidocaine to potentially temporize swelling; if his swelling returns during his prednisone taper, he may benefit from a neuropathic pain drug such as gabapentin, but will defer to his PCP for further management as suspicion at this point for inflammatory arthritis is quite low; Pain Management may be better suited for long-term management of CRPS    1. Complex regional pain syndrome type 1 of both upper extremities  - lidocaine (XYLOCAINE) 4 % topical cream; Apply  to affected area two (2) times daily as needed (hand or foot pain or swelling). Dispense: 45 g; Refill: 2      Patient Instructions   1. I think at this point the chances of the hand swelling being from inflammatory disease in the joints is quite low--this is most likely complex regional pain syndrome (CRPS) Type 1, a poorly understood syndrome of vascular and nerve dysregulation resulting in swelling, discoloration, and often pain in the extremities that can occur after surgeries and heart events. 2. Reduce prednisone to 2.5mg daily (or 5mg every other day) for 2-4 weeks, then OK to stop if swelling has not worsened. 3. I'm prescribing topical lidocaine to use on the hands as needed for redness, swelling, or discomfort. 4. Reach out to Dr. Chandrika Leon to continue monitoring this going forward; I would recommend an EMG and Neurology referral for CRPS management if the swelling continues to be a problem.       Orders Placed This Encounter    lidocaine (XYLOCAINE) 4 % topical cream       Medications: I have discontinued Rosi Hawkins LUIS PinaTerrance's albuterol, amiodarone, bumetanide, hydrOXYchloroQUINE, and furosemide. I am also having him start on lidocaine. Additionally, I am having him maintain his Blood-Glucose Meter, lancets, glucose blood VI test strips, atorvastatin, carvediloL, lisinopriL, aspirin, predniSONE, and glipiZIDE SR. Follow up: Return if symptoms worsen or fail to improve.     Phone time: 15 minutes  Note preparation and records review day of service: 15 minutes  Total provider time day of service: 30 minutes      Torito Singh MD    Adult Rheumatology   [address]  [facility] [address] [facility]   Phone [phone]  Fax [phone]

## 2021-05-09 RX ORDER — TRANDOLAPRIL 2 MG/1
TABLET ORAL
Qty: 90 TAB | Refills: 1 | OUTPATIENT
Start: 2021-05-09

## 2021-05-09 RX ORDER — SITAGLIPTIN AND METFORMIN HYDROCHLORIDE 500; 50 MG/1; MG/1
TABLET, FILM COATED ORAL
Qty: 180 TAB | Refills: 0 | Status: SHIPPED | OUTPATIENT
Start: 2021-05-09 | End: 2021-08-04

## 2021-05-11 RX ORDER — POTASSIUM CHLORIDE 1500 MG/1
20 TABLET, FILM COATED, EXTENDED RELEASE ORAL 2 TIMES DAILY
Qty: 180 TAB | Refills: 0 | Status: SHIPPED | OUTPATIENT
Start: 2021-05-11 | End: 2021-10-12 | Stop reason: SDUPTHER

## 2021-05-17 RX ORDER — ATORVASTATIN CALCIUM 80 MG/1
80 TABLET, FILM COATED ORAL DAILY
Qty: 90 TAB | Refills: 1 | Status: SHIPPED | OUTPATIENT
Start: 2021-05-17 | End: 2021-11-14

## 2021-06-08 ENCOUNTER — OFFICE VISIT (OUTPATIENT)
Dept: INTERNAL MEDICINE CLINIC | Age: 61
End: 2021-06-08
Payer: MEDICARE

## 2021-06-08 VITALS
OXYGEN SATURATION: 98 % | TEMPERATURE: 97.3 F | HEIGHT: 68 IN | RESPIRATION RATE: 20 BRPM | WEIGHT: 175.5 LBS | SYSTOLIC BLOOD PRESSURE: 70 MMHG | DIASTOLIC BLOOD PRESSURE: 43 MMHG | HEART RATE: 79 BPM | BODY MASS INDEX: 26.6 KG/M2

## 2021-06-08 DIAGNOSIS — M79.89 SWELLING OF BOTH HANDS: ICD-10-CM

## 2021-06-08 DIAGNOSIS — I25.10 CORONARY ARTERY DISEASE INVOLVING NATIVE CORONARY ARTERY OF NATIVE HEART WITHOUT ANGINA PECTORIS: Primary | ICD-10-CM

## 2021-06-08 DIAGNOSIS — L97.529: ICD-10-CM

## 2021-06-08 DIAGNOSIS — C91.10 CLL (CHRONIC LYMPHOCYTIC LEUKEMIA) (HCC): ICD-10-CM

## 2021-06-08 DIAGNOSIS — I10 HYPERTENSION, ESSENTIAL: ICD-10-CM

## 2021-06-08 PROCEDURE — G8752 SYS BP LESS 140: HCPCS | Performed by: INTERNAL MEDICINE

## 2021-06-08 PROCEDURE — 99214 OFFICE O/P EST MOD 30 MIN: CPT | Performed by: INTERNAL MEDICINE

## 2021-06-08 PROCEDURE — G0463 HOSPITAL OUTPT CLINIC VISIT: HCPCS | Performed by: INTERNAL MEDICINE

## 2021-06-08 PROCEDURE — 3017F COLORECTAL CA SCREEN DOC REV: CPT | Performed by: INTERNAL MEDICINE

## 2021-06-08 PROCEDURE — G8427 DOCREV CUR MEDS BY ELIG CLIN: HCPCS | Performed by: INTERNAL MEDICINE

## 2021-06-08 PROCEDURE — G8419 CALC BMI OUT NRM PARAM NOF/U: HCPCS | Performed by: INTERNAL MEDICINE

## 2021-06-08 PROCEDURE — G8754 DIAS BP LESS 90: HCPCS | Performed by: INTERNAL MEDICINE

## 2021-06-08 PROCEDURE — G8432 DEP SCR NOT DOC, RNG: HCPCS | Performed by: INTERNAL MEDICINE

## 2021-06-08 RX ORDER — FUROSEMIDE 40 MG/1
40 TABLET ORAL DAILY
COMMUNITY
Start: 2021-04-18 | End: 2021-09-29 | Stop reason: SDUPTHER

## 2021-06-08 RX ORDER — CARVEDILOL 3.12 MG/1
3.12 TABLET ORAL 2 TIMES DAILY WITH MEALS
COMMUNITY
End: 2022-08-26

## 2021-06-08 NOTE — ASSESSMENT & PLAN NOTE
well controlled, asymptomatic. BP is low today, higher at home, lipids are well controlled (will get outside records to review). Continue current meds. Will have him check amb BP TID x 5 days and update me to see why the drop in BP in the afternoon.

## 2021-06-08 NOTE — PROGRESS NOTES
Garrison Campbell is a 59 y.o. male who was seen in clinic today (6/8/2021). Assessment & Plan:   Below is the assessment and plan developed based on review of pertinent history, physical exam, labs, studies, and medications. 1. Coronary artery disease involving native coronary artery of native heart without angina pectoris  Assessment & Plan:  well controlled, asymptomatic. BP is low today, higher at home, lipids are well controlled (will get outside records to review). Continue current meds. Will have him check amb BP TID x 5 days and update me to see why the drop in BP in the afternoon. 2. CLL (chronic lymphocytic leukemia) ([facility] [address]  Assessment & Plan:   monitored by specialist. No acute findings meriting change in the plan  3. Hypertension, essential  Assessment & Plan:  Low today, improved at home, asymptomatic, no medication changes, will check amb BP TID x 5 days and family will update me next week. Okay to remain off lisinopril for now. 4. Ulcer of foot, left, with unspecified severity ([facility] [address]  Comments:  new dx, no signs of infection, will schedule w/ podiatry  5. Swelling of both hands  Comments:  improved but not resolved, will d/w rheumatology. Symptoms started prior to MI/CABG so will d/w rheum about CRPS    Follow-up and Dispositions    · Return in about 4 months (around 10/8/2021) for Regular follow up. Subjective/Objective:   Karolina Garcia was seen today for Coronary Artery Disease    Cardiovascular Review  The patient has hypertension, hyperlipidemia and coronary artery disease. Since last visit cardiac surgeon is not longer following him. He did see cardiologist last week. He reports taking medications as instructed, no medication side effects noted, home BP monitoring in range of 371-531'R systolic over 65-74'R diastolic. He generally follows a low fat low cholesterol diet, generally follows a low sodium diet.            Prior to Admission medications    Medication Sig Start Date End Date Taking? Authorizing Provider   carvediloL (COREG) 3.125 mg tablet Take 3.125 mg by mouth two (2) times daily (with meals). Yes Provider, Historical   furosemide (LASIX) 40 mg tablet Take 40 mg by mouth daily. 4/18/21  Yes Provider, Historical   predniSONE (DELTASONE) 5 mg tablet Take 0.5 Tablets by mouth daily. Stop once prescription complete. 6/3/21  Yes Yevgeniy Abraham MD   atorvastatin (LIPITOR) 80 mg tablet Take 1 Tab by mouth daily. 5/17/21  Yes Donnell Hess MD   potassium chloride SR (K-TAB) 20 mEq tablet Take 1 Tab by mouth two (2) times a day. 5/11/21  Yes Donnell Hess MD   Janumet  mg per tablet TAKE 1 TABLET BY MOUTH TWICE A DAY WITH MEALS  Patient taking differently: 1 Tablet daily. 5/9/21  Yes Donnell Hess MD   glipiZIDE SR (GLUCOTROL XL) 5 mg CR tablet TAKE 1 TABLET BY MOUTH EVERY DAY 4/18/21  Yes Donnell Hess MD   aspirin (ASPIRIN) 325 mg tablet Take 1 Tab by mouth daily. 3/3/21  Yes Donnell Hess MD   glucose blood VI test strips (ONETOUCH ULTRA BLUE TEST STRIP) strip TEST DAILY. Dx:  E11.29 5/29/18  Yes Donnell Hess MD   Lancets misc Test BS daily. DX:250.00 2/22/17  Yes Donnell Hess MD   Blood-Glucose Meter monitoring kit Test BS daily. DX:250.00 7/28/15  Yes Donnell Hess MD   lidocaine (XYLOCAINE) 4 % topical cream Apply  to affected area two (2) times daily as needed (hand or foot pain or swelling). Patient not taking: Reported on 6/8/2021 5/5/21 6/8/21  Yevgeniy Abraham MD   lisinopriL (PRINIVIL, ZESTRIL) 5 mg tablet Take 2.5 mg by mouth daily. Provider, Historical   carvediloL (COREG) 6.25 mg tablet Take 6.25 mg by mouth two (2) times daily (with meals). Patient not taking: Reported on 6/8/2021 6/8/21  Provider, Historical        Review of Systems   Respiratory: Negative for cough and shortness of breath. Cardiovascular: Negative for chest pain and palpitations.    Gastrointestinal: Negative for abdominal pain, constipation, diarrhea, nausea and vomiting. Musculoskeletal: Positive for myalgias (L foot). Physical Exam  Cardiovascular:      Rate and Rhythm: Regular rhythm. Heart sounds: Normal heart sounds. No murmur heard. Pulmonary:      Effort: Pulmonary effort is normal.      Breath sounds: Normal breath sounds. No wheezing or rales. Abdominal:      General: Bowel sounds are normal.      Palpations: Abdomen is soft. There is no mass. Tenderness: There is no abdominal tenderness. Feet:      Left foot:      Skin integrity: Ulcer and callus present. No blister, skin breakdown or erythema. Comments: Tender to palpation over lateral callus on the L foot. No pain on medial callus. No fluctuance. No erythema.               Visit Vitals  BP (!) 70/43   Pulse 79   Temp 97.3 °F (36.3 °C) (Temporal)   Resp 20   Ht 5' 7.5\" (1.715 m)   Wt 175 lb 8 oz (79.6 kg)   SpO2 98%   BMI 27.08 kg/m²         Jarett Lockhart MD

## 2021-06-08 NOTE — ASSESSMENT & PLAN NOTE
Low today, improved at home, asymptomatic, no medication changes, will check amb BP TID x 5 days and family will update me next week. Okay to remain off lisinopril for now.

## 2021-06-08 NOTE — Clinical Note
[place],  Can we chat tomorrow regarding him and his dx of CRPS? I have questions about the time course (sx started in Jan and did not have his MI/CABG until the next month).  Jake Monroe  ([phone]- [phone])

## 2021-07-12 RX ORDER — GLIPIZIDE 5 MG/1
TABLET, FILM COATED, EXTENDED RELEASE ORAL
Qty: 90 TABLET | Refills: 0 | Status: SHIPPED | OUTPATIENT
Start: 2021-07-12 | End: 2021-10-07

## 2021-08-04 RX ORDER — SITAGLIPTIN AND METFORMIN HYDROCHLORIDE 500; 50 MG/1; MG/1
TABLET, FILM COATED ORAL
Qty: 180 TABLET | Refills: 1 | Status: SHIPPED | OUTPATIENT
Start: 2021-08-04 | End: 2022-02-03

## 2021-09-20 RX ORDER — LISINOPRIL 5 MG/1
2.5 TABLET ORAL DAILY
Qty: 45 TABLET | Refills: 1 | Status: SHIPPED | OUTPATIENT
Start: 2021-09-20 | End: 2022-03-18

## 2021-09-29 RX ORDER — FUROSEMIDE 40 MG/1
40 TABLET ORAL DAILY
Qty: 90 TABLET | Refills: 0 | Status: SHIPPED | OUTPATIENT
Start: 2021-09-29 | End: 2021-11-14

## 2021-09-29 NOTE — TELEPHONE ENCOUNTER
See MyChart message    Future Appointments   Date Time Provider Elisa Milton   10/12/2021 10:15 AM Twyla Vickers MD [facility]

## 2021-10-07 RX ORDER — GLIPIZIDE 5 MG/1
TABLET, FILM COATED, EXTENDED RELEASE ORAL
Qty: 90 TABLET | Refills: 1 | Status: SHIPPED | OUTPATIENT
Start: 2021-10-07 | End: 2022-02-01

## 2021-10-12 ENCOUNTER — OFFICE VISIT (OUTPATIENT)
Dept: INTERNAL MEDICINE CLINIC | Age: 61
End: 2021-10-12
Payer: MEDICARE

## 2021-10-12 VITALS
RESPIRATION RATE: 20 BRPM | SYSTOLIC BLOOD PRESSURE: 97 MMHG | TEMPERATURE: 97.8 F | BODY MASS INDEX: 27.28 KG/M2 | DIASTOLIC BLOOD PRESSURE: 61 MMHG | WEIGHT: 180 LBS | HEART RATE: 73 BPM | OXYGEN SATURATION: 100 % | HEIGHT: 68 IN

## 2021-10-12 DIAGNOSIS — R80.9 DIABETES MELLITUS WITH MICROALBUMINURIA (HCC): Primary | ICD-10-CM

## 2021-10-12 DIAGNOSIS — I25.10 CORONARY ARTERY DISEASE INVOLVING NATIVE CORONARY ARTERY OF NATIVE HEART WITHOUT ANGINA PECTORIS: ICD-10-CM

## 2021-10-12 DIAGNOSIS — E11.29 DIABETES MELLITUS WITH MICROALBUMINURIA (HCC): Primary | ICD-10-CM

## 2021-10-12 DIAGNOSIS — I10 HYPERTENSION, ESSENTIAL: ICD-10-CM

## 2021-10-12 DIAGNOSIS — R60.0 BILATERAL LOWER EXTREMITY EDEMA: ICD-10-CM

## 2021-10-12 DIAGNOSIS — E78.00 PURE HYPERCHOLESTEROLEMIA: ICD-10-CM

## 2021-10-12 PROCEDURE — G8754 DIAS BP LESS 90: HCPCS | Performed by: INTERNAL MEDICINE

## 2021-10-12 PROCEDURE — 3017F COLORECTAL CA SCREEN DOC REV: CPT | Performed by: INTERNAL MEDICINE

## 2021-10-12 PROCEDURE — G8432 DEP SCR NOT DOC, RNG: HCPCS | Performed by: INTERNAL MEDICINE

## 2021-10-12 PROCEDURE — G8427 DOCREV CUR MEDS BY ELIG CLIN: HCPCS | Performed by: INTERNAL MEDICINE

## 2021-10-12 PROCEDURE — 2022F DILAT RTA XM EVC RTNOPTHY: CPT | Performed by: INTERNAL MEDICINE

## 2021-10-12 PROCEDURE — 99214 OFFICE O/P EST MOD 30 MIN: CPT | Performed by: INTERNAL MEDICINE

## 2021-10-12 PROCEDURE — G8752 SYS BP LESS 140: HCPCS | Performed by: INTERNAL MEDICINE

## 2021-10-12 PROCEDURE — G0463 HOSPITAL OUTPT CLINIC VISIT: HCPCS | Performed by: INTERNAL MEDICINE

## 2021-10-12 PROCEDURE — G8419 CALC BMI OUT NRM PARAM NOF/U: HCPCS | Performed by: INTERNAL MEDICINE

## 2021-10-12 PROCEDURE — 3044F HG A1C LEVEL LT 7.0%: CPT | Performed by: INTERNAL MEDICINE

## 2021-10-12 RX ORDER — POTASSIUM CHLORIDE 1500 MG/1
20 TABLET, FILM COATED, EXTENDED RELEASE ORAL DAILY
Qty: 90 TABLET | Refills: 1 | Status: SHIPPED | OUTPATIENT
Start: 2021-10-12 | End: 2022-05-02

## 2021-10-12 NOTE — PROGRESS NOTES
Jordi Ruffin is a 59 y.o. male who was seen in clinic today (10/12/2021). He RTC with his niece Raven Waggoner. Assessment & Plan:   Below is the assessment and plan developed based on review of pertinent history, physical exam, labs, studies, and medications. 1. Diabetes mellitus with microalbuminuria (HCC)  Assessment & Plan:  previously at goal, continue current treatment pending review of labs    Orders:  -     potassium chloride SR (K-TAB) 20 mEq tablet; Take 1 Tablet by mouth daily. , Normal, Disp-90 Tablet, R-1  -     METABOLIC PANEL, COMPREHENSIVE; Future  -     CBC W/O DIFF; Future  -     HEMOGLOBIN A1C WITH EAG; Future  -     LIPID PANEL; Future  -     MICROALBUMIN, UR, RAND W/ MICROALB/CREAT RATIO; Future  2. Coronary artery disease involving native coronary artery of native heart without angina pectoris  Assessment & Plan:  Asymptomatic, well controlled & stable. BP is well controlled, lipids are well controlled. Continue: current plan pending review of labs. Orders:  -     METABOLIC PANEL, COMPREHENSIVE; Future  -     CBC W/O DIFF; Future  -     LIPID PANEL; Future  3. Hypertension, essential  Assessment & Plan:  well controlled, continue current treatment pending review of labs   Orders:  -     METABOLIC PANEL, COMPREHENSIVE; Future  -     CBC W/O DIFF; Future  4. Pure hypercholesterolemia  Assessment & Plan:  at goal, continue current treatment pending review of labs   Orders:  -     METABOLIC PANEL, COMPREHENSIVE; Future  -     LIPID PANEL; Future  5. Bilateral lower extremity edema  Comments:  recurrent, likely due to venous insufficiency, nothing to suggest CHF, previous EF looked good, will check labs, continue w/ diuretics      Did review w/ niece can add in an extra 1/2 to 1 tab of lasix in the afternoon as needed if the swelling gets worse. They need to update me if it becomes a regular need. May need repeat TTE.       Follow-up and Dispositions    · Return in about 4 months (around 2/12/2022) for FULL PHYSICAL - 30 minutes. Subjective/Objective:   Jesus Ryan was seen today for Diabetes and Coronary Artery Disease    Since last visit: ran out of lasix for a few weeks and feet swelled up, improved once it was restarted. Endocrine Review  He is seen for diabetes. Testing: is performed regularly, fasting minimum reading is 90's, maximum reading is 200's, and average reading is 120's, patient did not bring glucose log to this visit. He reports medication compliance: compliant all of the time. Medication side effects: none. Diabetic diet compliance: compliant most of the time. Cardiovascular Review  The patient has hypertension, hyperlipidemia and coronary artery disease. He reports taking medications as instructed, no medication side effects noted, patient does not perform home BP monitoring. He denies orthopnea, SOB/MCKEON, or PND. Prior to Admission medications    Medication Sig Start Date End Date Taking? Authorizing Provider   ferrous sulfate (SLOW FE PO) Take  by mouth daily. Yes Provider, Historical   glipiZIDE SR (GLUCOTROL XL) 5 mg CR tablet TAKE 1 TABLET BY MOUTH EVERY DAY 10/7/21  Yes Alfred Jamil MD   furosemide (LASIX) 40 mg tablet Take 1 Tablet by mouth daily. 9/29/21  Yes Alfred Jamil MD   lisinopriL (PRINIVIL, ZESTRIL) 5 mg tablet Take 0.5 Tablets by mouth daily. 9/20/21  Yes Alfred Jamil MD   Janumet  mg per tablet TAKE 1 TABLET BY MOUTH TWICE A DAY WITH MEALS 8/4/21  Yes Alfred Jamil MD   carvediloL (COREG) 3.125 mg tablet Take 3.125 mg by mouth two (2) times daily (with meals). Yes Provider, Historical   atorvastatin (LIPITOR) 80 mg tablet Take 1 Tab by mouth daily. 5/17/21  Yes Alfred Jamil MD   potassium chloride SR (K-TAB) 20 mEq tablet Take 1 Tab by mouth two (2) times a day. Patient taking differently: Take 20 mEq by mouth daily.  5/11/21  Yes Alfred Jamil MD   aspirin (ASPIRIN) 325 mg tablet Take 1 Tab by mouth daily. 3/3/21  Yes José Miguel Kwan MD   glucose blood VI test strips (ONETOUCH ULTRA BLUE TEST STRIP) strip TEST DAILY. Dx:  E11.29 5/29/18  Yes José Miguel Kwan MD   Lancets misc Test BS daily. DX:250.00 2/22/17  Yes José Miguel Kwan MD   Blood-Glucose Meter monitoring kit Test BS daily. DX:250.00 7/28/15  Yes José Miguel Kwan MD   predniSONE (DELTASONE) 5 mg tablet TAKE 1/2 TABLET BY MOUTH ONCE DAILY 7/2/21 10/12/21  Emiliano Velazquez MD        Physical Exam  Constitutional:       General: He is not in acute distress. Appearance: Normal appearance. Eyes:      Conjunctiva/sclera: Conjunctivae normal.   Cardiovascular:      Rate and Rhythm: Regular rhythm. Pulses:           Dorsalis pedis pulses are 2+ on the right side and 2+ on the left side. Heart sounds: No murmur heard. Pulmonary:      Effort: Pulmonary effort is normal.      Breath sounds: Normal breath sounds. No decreased breath sounds or wheezing. Abdominal:      General: Bowel sounds are normal.      Palpations: Abdomen is soft. Tenderness: There is no abdominal tenderness. Musculoskeletal:      Right lower leg: Edema (trace to 1+ in ankles) present. Left lower leg: Edema (3+ in the ankles) present. Feet:      Right foot:      Skin integrity: Callus (base of the 1st toe, heeled ulcer) present.       Toenail Condition: Right toenails are normal.      Left foot:      Skin integrity: Skin integrity normal.      Toenail Condition: Left toenails are normal.      Comments:        Left foot Filament test: normal sensation        Right foot Filament test: normal sensation   Psychiatric:         Mood and Affect: Mood and affect normal.         Behavior: Behavior normal.       Visit Vitals  BP 97/61   Pulse 73   Temp 97.8 °F (36.6 °C) (Temporal)   Resp 20   Ht 5' 7.5\" (1.715 m)   Wt 180 lb (81.6 kg)   SpO2 100%   BMI 27.78 kg/m²       Libby Ureña MD

## 2021-10-12 NOTE — ASSESSMENT & PLAN NOTE
Asymptomatic, well controlled & stable. BP is well controlled, lipids are well controlled. Continue: current plan pending review of labs.

## 2021-10-13 ENCOUNTER — TELEPHONE (OUTPATIENT)
Dept: INTERNAL MEDICINE CLINIC | Age: 61
End: 2021-10-13

## 2021-10-13 NOTE — TELEPHONE ENCOUNTER
Patient's sister Gilmar Alonzo on HIPAA called office to express concerns from office visit yesterday. Jacqueline's daughter, patient's niece, accompanied pt to appt. Sister Gilmar Alonzo upset that niece was asked to accompany pt in the bathroom to assist him with voiding. This embarrassed both the patient and niece. I apologized for this and offered to mail lab slip to them so they can get labs and urine done closer to their home. Sister asked that sensitivity training of our staff be done as her brother has mental delays but still has feelings, assured I will make sure to reinforce this with our staff and that we strive to give a high level of care. Per our staff since patient was unable to void at time of lab collect and he was asked to drink water down in the lobby and then have urine and blood collected together in the draw center downstairs and that patient and niece did not voice concerns with this plan. Again apologized to patient's daughter and will mail new lab slip.

## 2021-11-14 RX ORDER — FUROSEMIDE 40 MG/1
TABLET ORAL
Qty: 90 TABLET | Refills: 0 | Status: SHIPPED | OUTPATIENT
Start: 2021-11-14 | End: 2022-01-18 | Stop reason: SDUPTHER

## 2021-11-14 RX ORDER — ATORVASTATIN CALCIUM 80 MG/1
TABLET, FILM COATED ORAL
Qty: 90 TABLET | Refills: 1 | Status: SHIPPED | OUTPATIENT
Start: 2021-11-14 | End: 2022-05-12

## 2021-11-15 NOTE — TELEPHONE ENCOUNTER
Please call patient's sister. Overdue for labs. There was a mix up issue when he was here last time (please read note prior to calling family). Needs repeat labs.

## 2021-11-16 NOTE — TELEPHONE ENCOUNTER
Notified sister Henna Aaron). Patient scheduled with another provider this week and will have labs completed then.  Understanding verbalized

## 2021-11-18 LAB
ALBUMIN SERPL-MCNC: 4.6 G/DL (ref 3.8–4.8)
ALBUMIN/CREAT UR: 14 MG/G CREAT (ref 0–29)
ALBUMIN/GLOB SERPL: 2.6 {RATIO} (ref 1.2–2.2)
ALP SERPL-CCNC: 111 IU/L (ref 44–121)
ALT SERPL-CCNC: 41 IU/L (ref 0–44)
AST SERPL-CCNC: 43 IU/L (ref 0–40)
BASOPHILS # BLD AUTO: 0 X10E3/UL (ref 0–0.2)
BASOPHILS NFR BLD AUTO: 0 %
BILIRUB SERPL-MCNC: 0.5 MG/DL (ref 0–1.2)
BUN SERPL-MCNC: 17 MG/DL (ref 8–27)
BUN/CREAT SERPL: 18 (ref 10–24)
CALCIUM SERPL-MCNC: 9.4 MG/DL (ref 8.6–10.2)
CHLORIDE SERPL-SCNC: 104 MMOL/L (ref 96–106)
CHOLEST SERPL-MCNC: 86 MG/DL (ref 100–199)
CO2 SERPL-SCNC: 27 MMOL/L (ref 20–29)
CREAT SERPL-MCNC: 0.94 MG/DL (ref 0.76–1.27)
CREAT UR-MCNC: 177.1 MG/DL
EOSINOPHIL # BLD AUTO: 0.1 X10E3/UL (ref 0–0.4)
EOSINOPHIL NFR BLD AUTO: 1 %
ERYTHROCYTE [DISTWIDTH] IN BLOOD BY AUTOMATED COUNT: 14.7 % (ref 11.6–15.4)
EST. AVERAGE GLUCOSE BLD GHB EST-MCNC: 128 MG/DL
GLOBULIN SER CALC-MCNC: 1.8 G/DL (ref 1.5–4.5)
GLUCOSE SERPL-MCNC: 101 MG/DL (ref 65–99)
HBA1C MFR BLD: 6.1 % (ref 4.8–5.6)
HCT VFR BLD AUTO: 39.3 % (ref 37.5–51)
HDLC SERPL-MCNC: 24 MG/DL
HGB BLD-MCNC: 12.7 G/DL (ref 13–17.7)
IMM GRANULOCYTES # BLD AUTO: 0 X10E3/UL (ref 0–0.1)
IMM GRANULOCYTES NFR BLD AUTO: 0 %
LDLC SERPL CALC-MCNC: 47 MG/DL (ref 0–99)
LYMPHOCYTES # BLD AUTO: 7.3 X10E3/UL (ref 0.7–3.1)
LYMPHOCYTES NFR BLD AUTO: 60 %
MCH RBC QN AUTO: 28.9 PG (ref 26.6–33)
MCHC RBC AUTO-ENTMCNC: 32.3 G/DL (ref 31.5–35.7)
MCV RBC AUTO: 89 FL (ref 79–97)
MICROALBUMIN UR-MCNC: 24.8 UG/ML
MONOCYTES # BLD AUTO: 1.1 X10E3/UL (ref 0.1–0.9)
MONOCYTES NFR BLD AUTO: 9 %
MORPHOLOGY BLD-IMP: ABNORMAL
NEUTROPHILS # BLD AUTO: 3.6 X10E3/UL (ref 1.4–7)
NEUTROPHILS NFR BLD AUTO: 30 %
PLATELET # BLD AUTO: 112 X10E3/UL (ref 150–450)
POTASSIUM SERPL-SCNC: 4.9 MMOL/L (ref 3.5–5.2)
PROT SERPL-MCNC: 6.4 G/DL (ref 6–8.5)
RBC # BLD AUTO: 4.4 X10E6/UL (ref 4.14–5.8)
SODIUM SERPL-SCNC: 144 MMOL/L (ref 134–144)
TRIGL SERPL-MCNC: 67 MG/DL (ref 0–149)
VLDLC SERPL CALC-MCNC: 15 MG/DL (ref 5–40)
WBC # BLD AUTO: 12.2 X10E3/UL (ref 3.4–10.8)

## 2021-11-19 NOTE — PROGRESS NOTES
Results released to patient via [employer]. All labs are stable or at goal for him. WBC elevated and low PLT. Has fluctuated, please route labs to oncologist.  DM control acceptable. Lipids stable. AST up slightly, will monitor.

## 2022-01-05 ENCOUNTER — TRANSCRIBE ORDER (OUTPATIENT)
Dept: SCHEDULING | Age: 62
End: 2022-01-05

## 2022-01-05 DIAGNOSIS — C91.10 CHRONIC LYMPHOCYTIC LEUKEMIA OF B-CELL TYPE NOT HAVING ACHIEVED REMISSION (HCC): ICD-10-CM

## 2022-01-05 DIAGNOSIS — L27.8: ICD-10-CM

## 2022-01-05 DIAGNOSIS — D72.829 ELEVATED WHITE BLOOD CELL COUNT, UNSPECIFIED: Primary | ICD-10-CM

## 2022-01-18 ENCOUNTER — HOSPITAL ENCOUNTER (OUTPATIENT)
Dept: PET IMAGING | Age: 62
Discharge: HOME | End: 2022-01-18
Attending: INTERNAL MEDICINE
Payer: MEDICARE

## 2022-01-18 VITALS — BODY MASS INDEX: 25.18 KG/M2 | HEIGHT: 69 IN | WEIGHT: 170 LBS

## 2022-01-18 VITALS — BODY MASS INDEX: 26.68 KG/M2 | WEIGHT: 170 LBS | HEIGHT: 67 IN

## 2022-01-18 DIAGNOSIS — D72.829 ELEVATED WHITE BLOOD CELL COUNT, UNSPECIFIED: ICD-10-CM

## 2022-01-18 DIAGNOSIS — L27.8: ICD-10-CM

## 2022-01-18 DIAGNOSIS — C91.10 CHRONIC LYMPHOCYTIC LEUKEMIA OF B-CELL TYPE NOT HAVING ACHIEVED REMISSION (HCC): ICD-10-CM

## 2022-01-18 LAB
GLUCOSE BLD STRIP.AUTO-MCNC: 173 MG/DL (ref 65–117)
SERVICE CMNT-IMP: ABNORMAL

## 2022-01-18 PROCEDURE — A9552 F18 FDG: HCPCS

## 2022-01-18 RX ORDER — FUROSEMIDE 40 MG/1
TABLET ORAL
Qty: 135 TABLET | Refills: 1 | Status: SHIPPED | OUTPATIENT
Start: 2022-01-18 | End: 2022-05-02

## 2022-01-18 RX ORDER — FLUDEOXYGLUCOSE F-18 200 MCI/ML
10 INJECTION INTRAVENOUS ONCE
Status: COMPLETED | OUTPATIENT
Start: 2022-01-18 | End: 2022-01-18

## 2022-01-18 RX ADMIN — FLUDEOXYGLUCOSE F-18 9.98 MILLICURIE: 200 INJECTION INTRAVENOUS at 09:05

## 2022-01-21 ENCOUNTER — TRANSCRIBE ORDER (OUTPATIENT)
Dept: SCHEDULING | Age: 62
End: 2022-01-21

## 2022-01-21 DIAGNOSIS — J90 PLEURAL EFFUSION ON RIGHT: Primary | ICD-10-CM

## 2022-02-01 ENCOUNTER — HOSPITAL ENCOUNTER (OUTPATIENT)
Dept: GENERAL RADIOLOGY | Age: 62
Discharge: HOME | End: 2022-02-01
Attending: NURSE PRACTITIONER
Payer: MEDICARE

## 2022-02-01 ENCOUNTER — HOSPITAL ENCOUNTER (OUTPATIENT)
Dept: ULTRASOUND IMAGING | Age: 62
Discharge: HOME | End: 2022-02-01
Attending: INTERNAL MEDICINE
Payer: MEDICARE

## 2022-02-01 VITALS — HEART RATE: 82 BPM | DIASTOLIC BLOOD PRESSURE: 62 MMHG | SYSTOLIC BLOOD PRESSURE: 104 MMHG | OXYGEN SATURATION: 100 %

## 2022-02-01 DIAGNOSIS — J90 PLEURAL EFFUSION ON RIGHT: ICD-10-CM

## 2022-02-01 LAB
COMMENT, HOLDF: NORMAL
SAMPLES BEING HELD,HOLD: NORMAL

## 2022-02-01 PROCEDURE — 88112 CYTOPATH CELL ENHANCE TECH: CPT

## 2022-02-01 PROCEDURE — 32555 ASPIRATE PLEURA W/ IMAGING: CPT

## 2022-02-01 PROCEDURE — C1729 CATH, DRAINAGE: HCPCS

## 2022-02-01 PROCEDURE — 71045 X-RAY EXAM CHEST 1 VIEW: CPT

## 2022-02-01 PROCEDURE — 88305 TISSUE EXAM BY PATHOLOGIST: CPT

## 2022-02-01 PROCEDURE — 77030032034 HC SYS EVAC ASEPT DISP BBMI -B

## 2022-02-01 RX ORDER — LIDOCAINE HYDROCHLORIDE 10 MG/ML
10 INJECTION, SOLUTION EPIDURAL; INFILTRATION; INTRACAUDAL; PERINEURAL
Status: COMPLETED | OUTPATIENT
Start: 2022-02-01 | End: 2022-02-01

## 2022-02-01 RX ORDER — GLIPIZIDE 5 MG/1
TABLET ORAL 2 TIMES DAILY
COMMUNITY
End: 2022-04-01 | Stop reason: ALTCHOICE

## 2022-02-01 RX ADMIN — LIDOCAINE HYDROCHLORIDE 10 ML: 10 INJECTION, SOLUTION EPIDURAL; INFILTRATION; INTRACAUDAL; PERINEURAL at 12:41

## 2022-02-03 RX ORDER — SITAGLIPTIN AND METFORMIN HYDROCHLORIDE 500; 50 MG/1; MG/1
TABLET, FILM COATED ORAL
Qty: 180 TABLET | Refills: 1 | Status: SHIPPED | OUTPATIENT
Start: 2022-02-03 | End: 2022-08-11

## 2022-03-18 RX ORDER — LISINOPRIL 5 MG/1
TABLET ORAL
Qty: 45 TABLET | Refills: 0 | Status: SHIPPED | OUTPATIENT
Start: 2022-03-18 | End: 2022-08-26

## 2022-03-18 NOTE — TELEPHONE ENCOUNTER
Future Appointments   Date Time Provider Elisa Milton   5/9/2022 10:15 AM Maisha Gracia MD [facility]

## 2022-03-20 PROBLEM — I25.10 CORONARY ARTERY DISEASE INVOLVING NATIVE CORONARY ARTERY OF NATIVE HEART WITHOUT ANGINA PECTORIS: Status: ACTIVE | Noted: 2021-03-03

## 2022-03-20 PROBLEM — Z95.1 HISTORY OF HEART BYPASS SURGERY: Status: ACTIVE | Noted: 2021-02-08

## 2022-04-01 RX ORDER — GLIPIZIDE 5 MG/1
TABLET, FILM COATED, EXTENDED RELEASE ORAL
Qty: 90 TABLET | Refills: 1 | Status: SHIPPED | OUTPATIENT
Start: 2022-04-01 | End: 2022-08-26 | Stop reason: ALTCHOICE

## 2022-05-02 DIAGNOSIS — R80.9 DIABETES MELLITUS WITH MICROALBUMINURIA (HCC): ICD-10-CM

## 2022-05-02 DIAGNOSIS — E11.29 DIABETES MELLITUS WITH MICROALBUMINURIA (HCC): ICD-10-CM

## 2022-05-02 RX ORDER — POTASSIUM CHLORIDE 1500 MG/1
TABLET, FILM COATED, EXTENDED RELEASE ORAL
Qty: 90 TABLET | Refills: 0 | Status: SHIPPED | OUTPATIENT
Start: 2022-05-02 | End: 2022-05-20

## 2022-05-02 RX ORDER — FUROSEMIDE 40 MG/1
TABLET ORAL
Qty: 90 TABLET | Refills: 0 | Status: SHIPPED | OUTPATIENT
Start: 2022-05-02 | End: 2022-08-11

## 2022-05-03 NOTE — TELEPHONE ENCOUNTER
Future Appointments   Date Time Provider Elisa Milton   6/29/2022  1:15 PM Marii Springer MD [facility]

## 2022-05-12 RX ORDER — ATORVASTATIN CALCIUM 80 MG/1
TABLET, FILM COATED ORAL
Qty: 90 TABLET | Refills: 0 | Status: SHIPPED | OUTPATIENT
Start: 2022-05-12 | End: 2022-08-11

## 2022-05-13 ENCOUNTER — TELEPHONE (OUTPATIENT)
Dept: INTERNAL MEDICINE CLINIC | Age: 62
End: 2022-05-13

## 2022-05-13 NOTE — TELEPHONE ENCOUNTER
Sister says that she will call and schedule appt for pt when she can find a day to take off of work.

## 2022-05-13 NOTE — TELEPHONE ENCOUNTER
Please call patient's POA. Is overdue for f/u. Has cancelled/reschedule multiple times. We cancelled his f/u in June but nothing is scheduled.   Needs appointment this month or June

## 2022-05-20 DIAGNOSIS — E11.29 DIABETES MELLITUS WITH MICROALBUMINURIA (HCC): ICD-10-CM

## 2022-05-20 DIAGNOSIS — R80.9 DIABETES MELLITUS WITH MICROALBUMINURIA (HCC): ICD-10-CM

## 2022-05-20 RX ORDER — POTASSIUM CHLORIDE 1500 MG/1
TABLET, FILM COATED, EXTENDED RELEASE ORAL
Qty: 30 TABLET | Refills: 0 | Status: SHIPPED | OUTPATIENT
Start: 2022-05-20 | End: 2022-08-11

## 2022-07-01 ENCOUNTER — TRANSCRIBE ORDER (OUTPATIENT)
Dept: SCHEDULING | Age: 62
End: 2022-07-01

## 2022-07-01 DIAGNOSIS — D72.829 ELEVATED WHITE BLOOD CELL COUNT, UNSPECIFIED: ICD-10-CM

## 2022-07-01 DIAGNOSIS — C91.10 CHRONIC LYMPHOCYTIC LEUKEMIA B-CELL TYPE NOT HAVING ACHIEVED REMISSION (HCC): ICD-10-CM

## 2022-07-01 DIAGNOSIS — L27.8: ICD-10-CM

## 2022-07-01 DIAGNOSIS — Z51.11 ENCOUNTER FOR ANTINEOPLASTIC CHEMOTHERAPY: Primary | ICD-10-CM

## 2022-07-01 DIAGNOSIS — J90 PLEURAL EFFUSION, NOT ELSEWHERE CLASSIFIED: ICD-10-CM

## 2022-07-08 ENCOUNTER — HOSPITAL ENCOUNTER (OUTPATIENT)
Dept: INTERVENTIONAL RADIOLOGY/VASCULAR | Age: 62
Discharge: HOME | End: 2022-07-08
Attending: INTERNAL MEDICINE
Payer: MEDICARE

## 2022-07-08 VITALS
TEMPERATURE: 98.3 F | HEART RATE: 98 BPM | WEIGHT: 167 LBS | OXYGEN SATURATION: 100 % | HEIGHT: 67 IN | RESPIRATION RATE: 20 BRPM | DIASTOLIC BLOOD PRESSURE: 67 MMHG | SYSTOLIC BLOOD PRESSURE: 124 MMHG | BODY MASS INDEX: 26.21 KG/M2

## 2022-07-08 DIAGNOSIS — Z51.11 ENCOUNTER FOR ANTINEOPLASTIC CHEMOTHERAPY: ICD-10-CM

## 2022-07-08 DIAGNOSIS — J90 PLEURAL EFFUSION, NOT ELSEWHERE CLASSIFIED: ICD-10-CM

## 2022-07-08 DIAGNOSIS — C91.10 CHRONIC LYMPHOCYTIC LEUKEMIA B-CELL TYPE NOT HAVING ACHIEVED REMISSION (HCC): ICD-10-CM

## 2022-07-08 DIAGNOSIS — D72.829 ELEVATED WHITE BLOOD CELL COUNT, UNSPECIFIED: ICD-10-CM

## 2022-07-08 DIAGNOSIS — L27.8: ICD-10-CM

## 2022-07-08 PROCEDURE — 74011250636 HC RX REV CODE- 250/636: Performed by: STUDENT IN AN ORGANIZED HEALTH CARE EDUCATION/TRAINING PROGRAM

## 2022-07-08 PROCEDURE — 74011250636 HC RX REV CODE- 250/636: Performed by: INTERNAL MEDICINE

## 2022-07-08 PROCEDURE — 77030010507 HC ADH SKN DERMBND J&J -B

## 2022-07-08 PROCEDURE — 77030031139 HC SUT VCRL2 J&J -A

## 2022-07-08 PROCEDURE — 74011000250 HC RX REV CODE- 250: Performed by: STUDENT IN AN ORGANIZED HEALTH CARE EDUCATION/TRAINING PROGRAM

## 2022-07-08 PROCEDURE — 2709999900 HC NON-CHARGEABLE SUPPLY

## 2022-07-08 PROCEDURE — C1788 PORT, INDWELLING, IMP: HCPCS

## 2022-07-08 PROCEDURE — C1892 INTRO/SHEATH,FIXED,PEEL-AWAY: HCPCS

## 2022-07-08 PROCEDURE — 36561 INSERT TUNNELED CV CATH: CPT

## 2022-07-08 RX ORDER — SODIUM CHLORIDE 9 MG/ML
25 INJECTION, SOLUTION INTRAVENOUS CONTINUOUS
Status: DISCONTINUED | OUTPATIENT
Start: 2022-07-08 | End: 2022-07-08

## 2022-07-08 RX ORDER — CLINDAMYCIN PHOSPHATE 600 MG/50ML
600 INJECTION INTRAVENOUS ONCE
Status: DISCONTINUED | OUTPATIENT
Start: 2022-07-08 | End: 2022-07-08

## 2022-07-08 RX ORDER — CLINDAMYCIN PHOSPHATE 900 MG/50ML
900 INJECTION INTRAVENOUS ONCE
Status: COMPLETED | OUTPATIENT
Start: 2022-07-08 | End: 2022-07-08

## 2022-07-08 RX ORDER — LIDOCAINE HYDROCHLORIDE AND EPINEPHRINE 10; 10 MG/ML; UG/ML
20 INJECTION, SOLUTION INFILTRATION; PERINEURAL ONCE
Status: ACTIVE | OUTPATIENT
Start: 2022-07-08 | End: 2022-07-08

## 2022-07-08 RX ORDER — MIDAZOLAM HYDROCHLORIDE 1 MG/ML
1-5 INJECTION, SOLUTION INTRAMUSCULAR; INTRAVENOUS
Status: DISCONTINUED | OUTPATIENT
Start: 2022-07-08 | End: 2022-07-08

## 2022-07-08 RX ORDER — HEPARIN 100 UNIT/ML
300 SYRINGE INTRAVENOUS ONCE
Status: COMPLETED | OUTPATIENT
Start: 2022-07-08 | End: 2022-07-08

## 2022-07-08 RX ORDER — LIDOCAINE HYDROCHLORIDE 20 MG/ML
10 INJECTION, SOLUTION INFILTRATION; PERINEURAL ONCE
Status: DISPENSED | OUTPATIENT
Start: 2022-07-08 | End: 2022-07-08

## 2022-07-08 RX ORDER — LIDOCAINE HYDROCHLORIDE 20 MG/ML
10 INJECTION, SOLUTION INFILTRATION; PERINEURAL ONCE
Status: COMPLETED | OUTPATIENT
Start: 2022-07-08 | End: 2022-07-08

## 2022-07-08 RX ORDER — HEPARIN SODIUM 200 [USP'U]/100ML
400 INJECTION, SOLUTION INTRAVENOUS ONCE
Status: COMPLETED | OUTPATIENT
Start: 2022-07-08 | End: 2022-07-08

## 2022-07-08 RX ORDER — FENTANYL CITRATE 50 UG/ML
100 INJECTION, SOLUTION INTRAMUSCULAR; INTRAVENOUS
Status: DISCONTINUED | OUTPATIENT
Start: 2022-07-08 | End: 2022-07-08

## 2022-07-08 RX ADMIN — FENTANYL CITRATE 50 MCG: 50 INJECTION, SOLUTION INTRAMUSCULAR; INTRAVENOUS at 08:56

## 2022-07-08 RX ADMIN — LIDOCAINE HYDROCHLORIDE 8 ML: 20 INJECTION, SOLUTION INFILTRATION; PERINEURAL at 09:00

## 2022-07-08 RX ADMIN — CLINDAMYCIN PHOSPHATE 900 MG: 900 INJECTION, SOLUTION INTRAVENOUS at 08:32

## 2022-07-08 RX ADMIN — SODIUM CHLORIDE, PRESERVATIVE FREE 500 UNITS: 5 INJECTION INTRAVENOUS at 09:01

## 2022-07-08 RX ADMIN — SODIUM CHLORIDE 25 ML/HR: 9 INJECTION, SOLUTION INTRAVENOUS at 08:09

## 2022-07-08 RX ADMIN — HEPARIN SODIUM IN SODIUM CHLORIDE 60 ML: 200 INJECTION INTRAVENOUS at 09:01

## 2022-07-08 NOTE — PROGRESS NOTES
Procedure reviewed with patient by Dr. Terri Go. Opportunity to verbalize questions and concerns. Consent obtained.

## 2022-07-08 NOTE — H&P
Interventional and Vascular Radiology History and Physical    Patient: Rhett Jeans 57 y.o. male       Chief Complaint: port placement     History of Present Illness: 57year old male presents for port placement for long term IV access.      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    1 yr old, developed PNA, was hypoxic, since then has had issues     Ill-defined condition      Family History   Problem Relation Age of Onset    COPD Mother     Cancer Father         lung cancer - small oat cell    Diabetes Brother     Hypertension Brother     High Cholesterol Brother     No Known Problems Sister      Social History     Socioeconomic History    Marital status: SINGLE     Spouse name: Not on file    Number of children: Not on file    Years of education: Not on file    Highest education level: Not on file   Occupational History    Not on file   Tobacco Use    Smoking status: Never Smoker    Smokeless tobacco: Never Used   Vaping Use    Vaping Use: Never used   Substance and Sexual Activity    Alcohol use: No    Drug use: No    Sexual activity: Never   Other Topics Concern     Service Not Asked    Blood Transfusions Not Asked    Caffeine Concern Not Asked    Occupational Exposure Not Asked    Hobby Hazards Not Asked    Sleep Concern Not Asked    Stress Concern Not Asked    Weight Concern Not Asked    Special Diet Not Asked    Back Care Not Asked    Exercise Not Asked    Bike Helmet Not Asked    Seat Belt Not Asked    Self-Exams Not Asked   Social History Narrative    Not on file     Social Determinants of Health     Financial Resource Strain:     Difficulty of Paying Living Expenses: Not on file   Food Insecurity:     Worried About Running Out of Food in the Last Year: Not on file    Dima of Food in the Last Year: Not on file   Transportation Needs:     Lack of Transportation (Medical): Not on file    Lack of Transportation (Non-Medical): Not on file   Physical Activity:     Days of Exercise per Week: Not on file    Minutes of Exercise per Session: Not on file   Stress:     Feeling of Stress : Not on file   Social Connections:     Frequency of Communication with Friends and Family: Not on file    Frequency of Social Gatherings with Friends and Family: Not on file    Attends [employer] Services: Not on file    Active Member of [employer] or Organizations: Not on file    Attends Club or Organization Meetings: Not on file    Marital Status: Not on file   Intimate Partner Violence:     Fear of Current or Ex-Partner: Not on file    Emotionally Abused: Not on file    Physically Abused: Not on file    Sexually Abused: Not on file   Housing Stability:     Unable to Pay for Housing in the Last Year: Not on file    Number of [place] in the Last Year: Not on file    Unstable Housing in the Last Year: Not on file       Allergies: Allergies   Allergen Reactions    Pcn [Penicillins] Anaphylaxis       Current Medications:  Current Outpatient Medications   Medication Sig    potassium chloride SR (K-TAB) 20 mEq tablet 1 tab PO daily. Appointment required prior to any further refills    atorvastatin (LIPITOR) 80 mg tablet TAKE 1 TABLET BY MOUTH EVERY DAY    furosemide (LASIX) 40 mg tablet TAKE 1 TABLET BY MOUTH EVERY DAY    glipiZIDE SR (GLUCOTROL XL) 5 mg CR tablet TAKE 1 TABLET BY MOUTH EVERY DAY    lisinopriL (PRINIVIL, ZESTRIL) 5 mg tablet TAKE 1/2 TABLET BY MOUTH EVERY DAY    Janumet  mg per tablet TAKE 1 TABLET BY MOUTH TWICE A DAY WITH MEALS    ferrous sulfate (SLOW FE PO) Take  by mouth daily.  carvediloL (COREG) 3.125 mg tablet Take 3.125 mg by mouth two (2) times daily (with meals).  aspirin (ASPIRIN) 325 mg tablet Take 1 Tab by mouth daily.     glucose blood VI test strips (ONETOUCH ULTRA BLUE TEST STRIP) strip TEST DAILY. Dx:  E11.29    Lancets misc Test BS daily. DX:250.00    Blood-Glucose Meter monitoring kit Test BS daily. DX:250.00     Current Facility-Administered Medications   Medication Dose Route Frequency    0.9% sodium chloride infusion  25 mL/hr IntraVENous CONTINUOUS    fentaNYL citrate (PF) injection 100 mcg  100 mcg IntraVENous Multiple    midazolam (VERSED) injection 1-5 mg  1-5 mg IntraVENous Multiple    clindamycin (CLEOCIN) 600mg D5W 50mL IVPB (premix)  600 mg IntraVENous ONCE    heparin (porcine) pf 300 Units  300 Units InterCATHeter ONCE    heparinized saline 2 units/mL infusion 800 Units  400 mL Irrigation ONCE    lidocaine (XYLOCAINE) 20 mg/mL (2 %) injection 200 mg  10 mL SubCUTAneous ONCE    lidocaine-EPINEPHrine (XYLOCAINE) 1 %-1:100,000 injection 200 mg  20 mL IntraDERMal ONCE    lidocaine (XYLOCAINE) 20 mg/mL (2 %) injection 200 mg  10 mL SubCUTAneous ONCE        Physical Exam:  Blood pressure (!) 106/56, pulse 91, temperature 98.3 °F (36.8 °C), resp. rate 20, height 5' 7\" (1.702 m), weight 75.8 kg (167 lb), SpO2 98 %. No acute distress  Good peripheral perfusion  Nonlabored respirations  Abdomen nondistended    Alerts:    Hospital Problems  Date Reviewed: 10/12/2021    None          Laboratory:    No results for input(s): HGB, HCT, WBC, PLT, INR, BUN, CREA, K, CRCLT, HGBEXT, HCTEXT, PLTEXT, INREXT in the last 72 hours. No lab exists for component: PTT, PT      Plan of Care/Planned Procedure:  Risks, benefits, and alternatives reviewed with patient's sister who agrees to proceed with the procedure. Conscious sedation will be performed with IV fentanyl and versed.        Lilliana Allan MD

## 2022-07-08 NOTE — DISCHARGE INSTRUCTIONS
[facility]  Angiography Department      Radiologist:         Date:         Portacath Discharge Instructions      Watch for signs of infection:    1. Redness,   2. Fever, chills,   3. Increased pain, and/or drainage from the site. If this occurs, call your physician at once. If you have an appointment with a provider or at the infusion center in the upcoming week,  they may check your site and change the dressing for you. Keep your dressing clean and dry. Leave the dressing in place for 3 days    Continue your previous diet and restart your regularly prescribed medications. You may take Tylenol, as directed on the label, for pain if needed. Because you received sedation, you are not to drive or sign any legal documents for the next 24 hours. Do not lift anything heavier than 5 pounds with the affected arm and avoid pushing and pulling movements for several days. If you have any questions or concerns, please call our radiology department at [phone].

## 2022-08-11 DIAGNOSIS — E11.29 DIABETES MELLITUS WITH MICROALBUMINURIA (HCC): ICD-10-CM

## 2022-08-11 DIAGNOSIS — R80.9 DIABETES MELLITUS WITH MICROALBUMINURIA (HCC): ICD-10-CM

## 2022-08-11 RX ORDER — POTASSIUM CHLORIDE 1500 MG/1
TABLET, FILM COATED, EXTENDED RELEASE ORAL
Qty: 30 TABLET | Refills: 0 | Status: SHIPPED | OUTPATIENT
Start: 2022-08-11

## 2022-08-11 RX ORDER — FUROSEMIDE 40 MG/1
TABLET ORAL
Qty: 90 TABLET | Refills: 0 | Status: CANCELLED | OUTPATIENT
Start: 2022-08-11

## 2022-08-11 RX ORDER — ATORVASTATIN CALCIUM 80 MG/1
TABLET, FILM COATED ORAL
Qty: 30 TABLET | Refills: 0 | Status: SHIPPED | OUTPATIENT
Start: 2022-08-11 | End: 2022-08-26 | Stop reason: SDUPTHER

## 2022-08-11 RX ORDER — FUROSEMIDE 40 MG/1
TABLET ORAL
Qty: 45 TABLET | Refills: 0 | Status: SHIPPED | OUTPATIENT
Start: 2022-08-11 | End: 2022-08-26 | Stop reason: SDUPTHER

## 2022-08-11 RX ORDER — SITAGLIPTIN AND METFORMIN HYDROCHLORIDE 500; 50 MG/1; MG/1
TABLET, FILM COATED ORAL
Qty: 60 TABLET | Refills: 0 | Status: SHIPPED | OUTPATIENT
Start: 2022-08-11 | End: 2022-08-26 | Stop reason: SDUPTHER

## 2022-08-12 NOTE — TELEPHONE ENCOUNTER
Please call patient's sister. Last seen in Oct '21. Overdue for f/u. Told in May needed f/u, scheduled in June but the cancelled and now scheduled in Dec.    Needs to be seen in August or September, 15 minute visit, ASAP.

## 2022-08-22 DIAGNOSIS — R80.9 DIABETES MELLITUS WITH MICROALBUMINURIA (HCC): Primary | ICD-10-CM

## 2022-08-22 DIAGNOSIS — E11.29 DIABETES MELLITUS WITH MICROALBUMINURIA (HCC): Primary | ICD-10-CM

## 2022-08-23 NOTE — PROGRESS NOTES
Future Appointments   Date Time Provider Elisa Karine   8/26/2022 12:15 PM Joe Thao MD [facility]   12/9/2022 11:00 AM Joe Thao MD [facility] AMB

## 2022-08-26 ENCOUNTER — OFFICE VISIT (OUTPATIENT)
Dept: INTERNAL MEDICINE CLINIC | Age: 62
End: 2022-08-26
Payer: MEDICARE

## 2022-08-26 VITALS
OXYGEN SATURATION: 98 % | HEART RATE: 78 BPM | SYSTOLIC BLOOD PRESSURE: 94 MMHG | BODY MASS INDEX: 27 KG/M2 | HEIGHT: 67 IN | TEMPERATURE: 97.9 F | RESPIRATION RATE: 18 BRPM | WEIGHT: 172 LBS | DIASTOLIC BLOOD PRESSURE: 60 MMHG

## 2022-08-26 DIAGNOSIS — R60.0 BILATERAL LOWER EXTREMITY EDEMA: ICD-10-CM

## 2022-08-26 DIAGNOSIS — Z12.11 COLON CANCER SCREENING: ICD-10-CM

## 2022-08-26 DIAGNOSIS — I10 HYPERTENSION, ESSENTIAL: ICD-10-CM

## 2022-08-26 DIAGNOSIS — C91.10 CLL (CHRONIC LYMPHOCYTIC LEUKEMIA) (HCC): ICD-10-CM

## 2022-08-26 DIAGNOSIS — E11.29 DIABETES MELLITUS WITH MICROALBUMINURIA (HCC): Primary | ICD-10-CM

## 2022-08-26 DIAGNOSIS — G93.1 HYPOXIC BRAIN DAMAGE (HCC): ICD-10-CM

## 2022-08-26 DIAGNOSIS — E78.00 PURE HYPERCHOLESTEROLEMIA: ICD-10-CM

## 2022-08-26 DIAGNOSIS — R80.9 DIABETES MELLITUS WITH MICROALBUMINURIA (HCC): Primary | ICD-10-CM

## 2022-08-26 DIAGNOSIS — I25.10 CORONARY ARTERY DISEASE INVOLVING NATIVE CORONARY ARTERY OF NATIVE HEART WITHOUT ANGINA PECTORIS: ICD-10-CM

## 2022-08-26 LAB
ALBUMIN SERPL-MCNC: 4.2 G/DL (ref 3.8–4.8)
ALBUMIN/GLOB SERPL: 2.8 {RATIO} (ref 1.2–2.2)
ALP SERPL-CCNC: 98 IU/L (ref 44–121)
ALT SERPL-CCNC: 23 IU/L (ref 0–44)
AST SERPL-CCNC: 22 IU/L (ref 0–40)
BILIRUB SERPL-MCNC: 0.7 MG/DL (ref 0–1.2)
BUN SERPL-MCNC: 13 MG/DL (ref 8–27)
BUN/CREAT SERPL: 18 (ref 10–24)
CALCIUM SERPL-MCNC: 9.3 MG/DL (ref 8.6–10.2)
CHLORIDE SERPL-SCNC: 100 MMOL/L (ref 96–106)
CHOLEST SERPL-MCNC: 70 MG/DL (ref 100–199)
CO2 SERPL-SCNC: 29 MMOL/L (ref 20–29)
CREAT SERPL-MCNC: 0.73 MG/DL (ref 0.76–1.27)
EGFR: 102 ML/MIN/1.73
EST. AVERAGE GLUCOSE BLD GHB EST-MCNC: 128 MG/DL
GLOBULIN SER CALC-MCNC: 1.5 G/DL (ref 1.5–4.5)
GLUCOSE SERPL-MCNC: 113 MG/DL (ref 65–99)
HBA1C MFR BLD: 6.1 % (ref 4.8–5.6)
HDLC SERPL-MCNC: 24 MG/DL
LDLC SERPL CALC-MCNC: 29 MG/DL (ref 0–99)
POTASSIUM SERPL-SCNC: 4.2 MMOL/L (ref 3.5–5.2)
PROT SERPL-MCNC: 5.7 G/DL (ref 6–8.5)
SODIUM SERPL-SCNC: 143 MMOL/L (ref 134–144)
SPECIMEN STATUS REPORT, ROLRST: NORMAL
TRIGL SERPL-MCNC: 82 MG/DL (ref 0–149)
VLDLC SERPL CALC-MCNC: 17 MG/DL (ref 5–40)

## 2022-08-26 PROCEDURE — 3044F HG A1C LEVEL LT 7.0%: CPT | Performed by: INTERNAL MEDICINE

## 2022-08-26 PROCEDURE — G8419 CALC BMI OUT NRM PARAM NOF/U: HCPCS | Performed by: INTERNAL MEDICINE

## 2022-08-26 PROCEDURE — G0463 HOSPITAL OUTPT CLINIC VISIT: HCPCS | Performed by: INTERNAL MEDICINE

## 2022-08-26 PROCEDURE — 99214 OFFICE O/P EST MOD 30 MIN: CPT | Performed by: INTERNAL MEDICINE

## 2022-08-26 PROCEDURE — 3017F COLORECTAL CA SCREEN DOC REV: CPT | Performed by: INTERNAL MEDICINE

## 2022-08-26 PROCEDURE — 2022F DILAT RTA XM EVC RTNOPTHY: CPT | Performed by: INTERNAL MEDICINE

## 2022-08-26 PROCEDURE — G8510 SCR DEP NEG, NO PLAN REQD: HCPCS | Performed by: INTERNAL MEDICINE

## 2022-08-26 PROCEDURE — G8754 DIAS BP LESS 90: HCPCS | Performed by: INTERNAL MEDICINE

## 2022-08-26 PROCEDURE — G8427 DOCREV CUR MEDS BY ELIG CLIN: HCPCS | Performed by: INTERNAL MEDICINE

## 2022-08-26 PROCEDURE — G8752 SYS BP LESS 140: HCPCS | Performed by: INTERNAL MEDICINE

## 2022-08-26 RX ORDER — SITAGLIPTIN AND METFORMIN HYDROCHLORIDE 500; 50 MG/1; MG/1
1 TABLET, FILM COATED ORAL 2 TIMES DAILY WITH MEALS
Qty: 180 TABLET | Refills: 1 | Status: SHIPPED | OUTPATIENT
Start: 2022-08-26

## 2022-08-26 RX ORDER — FUROSEMIDE 80 MG/1
TABLET ORAL
Qty: 135 TABLET | Refills: 0 | Status: SHIPPED | OUTPATIENT
Start: 2022-08-26 | End: 2022-10-17 | Stop reason: DRUGHIGH

## 2022-08-26 RX ORDER — PROCHLORPERAZINE MALEATE 10 MG
TABLET ORAL
COMMUNITY
Start: 2022-07-05

## 2022-08-26 RX ORDER — METOPROLOL SUCCINATE 25 MG/1
TABLET, EXTENDED RELEASE ORAL
COMMUNITY
Start: 2022-08-21 | End: 2022-10-17 | Stop reason: ALTCHOICE

## 2022-08-26 RX ORDER — ONDANSETRON HYDROCHLORIDE 8 MG/1
TABLET, FILM COATED ORAL
COMMUNITY
Start: 2022-07-05

## 2022-08-26 RX ORDER — ATORVASTATIN CALCIUM 80 MG/1
80 TABLET, FILM COATED ORAL DAILY
Qty: 90 TABLET | Refills: 1 | Status: SHIPPED | OUTPATIENT
Start: 2022-08-26

## 2022-08-26 NOTE — PROGRESS NOTES
Rhett Jeans is a 57 y.o. male who was seen in clinic today (8/26/2022). He RTC with his sister. Assessment & Plan:   Below is the assessment and plan developed based on review of pertinent history, physical exam, labs, studies, and medications. 1. Diabetes mellitus with microalbuminuria ([facility] [address]  Assessment & Plan:  well controlled, continue current treatment but will stop glipizide and re-assess at follow up   Orders:  -     SITagliptin-metFORMIN (Janumet)  mg per tablet; Take 1 Tablet by mouth two (2) times daily (with meals). , Normal, Disp-180 Tablet, R-1  2. Coronary artery disease involving native coronary artery of native heart without angina pectoris  Assessment & Plan:  stable, asymptomatic. BP is well controlled, lipids are well controlled. Continue: current plan pending review of labs. BP is low, will remain on BB as he is asymptomatic and reviewed cardiac benefits. 3. Hypertension, essential  Assessment & Plan:  well controlled, continue current treatment, likely to well controlled, will continue w/ low dose BB for now due to recent MI   4. Pure hypercholesterolemia  Assessment & Plan:  well controlled, continue current treatment    Orders:  -     atorvastatin (LIPITOR) 80 mg tablet; Take 1 Tablet by mouth daily. , Normal, Disp-90 Tablet, R-1  5. Bilateral lower extremity edema  Assessment & Plan:  Stable, not ideally controlled, family has compression socks and considering, will increase lasix dose and reassess, reviewed keeping feet elevated. Will check labs in 1 month   Orders:  -     furosemide (LASIX) 80 mg tablet; TAKE 1 TAB IN THE MORNING AND 1/2 TAB IN THE AFTERNOON, Normal, Disp-135 Tablet, R-0  -     METABOLIC PANEL, BASIC; Future  6. Colon cancer screening  Comments:  due to recent cardiac issues will start w/ stool testing  Orders:  -     COLOGUARD TEST (FECAL DNA COLORECTAL CANCER SCREENING)  7.  CLL (chronic lymphocytic leukemia) (HCC)  Assessment & Plan:  Asymptomatic, started chemo, tolerating well, monitored by specialist. No acute findings meriting change in the plan  8. Hypoxic brain damage (HCC)  Assessment & Plan:  Stable, no changes       Recommended to see podiatry regarding nail changes. Likely bruising. Follow-up and Dispositions    Return in about 6 months (around 2/26/2023) for FULL PHYSICAL. Subjective/Objective:   Duane Carton was seen today for Follow-up    He was last seen in clinic in Oct '21. Family has cancelled/rescheduled multiple appointments until now. Since last visit: has started chemo for CLL. He had labs done yesterday. Endocrine Review  He is seen for diabetes. Testing: is not performed. He reports medication compliance: compliant all of the time. Medication side effects: none. Diabetic diet compliance: compliant most of the time. Cardiovascular Review  The patient has hypertension, hyperlipidemia and coronary artery disease. He reports taking medications as instructed, no medication side effects noted, patient does not perform home BP monitoring. He denies orthopnea, SOB/MCKEON, or PND. Prior to Admission medications    Medication Sig Start Date End Date Taking?  Authorizing Provider   prochlorperazine (COMPAZINE) 10 mg tablet TAKE 1 TABLET BY MOUTH EVERY 6 HOURS AS NEEDED FOR NAUSEA 7/5/22  Yes Provider, Historical   ondansetron hcl (ZOFRAN) 8 mg tablet TAKE 1 TABLET BY MOUTH EVERY 8 HOURS AS NEEDED FOR UNRELIEVED NAUSEA 7/5/22  Yes Provider, Historical   metoprolol succinate (TOPROL-XL) 25 mg XL tablet  8/21/22  Yes Provider, Historical   aspirin 81 mg cap  2/1/22  Yes Provider, Historical   furosemide (LASIX) 40 mg tablet TAKE 1 TAB IN THE MORNING AND 1/2 TAB IN THE AFTERNOON 8/11/22  Yes Kalee Balderas MD   SITagliptin-metFORMIN (Janumet)  mg per tablet TAKE 1 TABLET BY MOUTH TWICE A DAY WITH MEALS 8/11/22  Yes Kalee Balderas MD   atorvastatin (LIPITOR) 80 mg tablet TAKE 1 TABLET BY MOUTH EVERY DAY 8/11/22  Yes Genesis Graves MD   potassium chloride SR (K-TAB) 20 mEq tablet TAKE 1 TABLET BY MOUTH DAILY. APPOINTMENT REQUIRED PRIOR TO ANY FURTHER REFILLS 8/11/22  Yes Genesis Graves MD   glipiZIDE SR (GLUCOTROL XL) 5 mg CR tablet TAKE 1 TABLET BY MOUTH EVERY DAY 4/1/22  Yes Genesis Graves MD   ferrous sulfate (SLOW FE PO) Take  by mouth daily. Yes Provider, Historical   glucose blood VI test strips (ONETOUCH ULTRA BLUE TEST STRIP) strip TEST DAILY. Dx:  E11.29 5/29/18  Yes Genesis Graves MD   Lancets misc Test BS daily. DX:250.00 2/22/17  Yes Genesis Graves MD   Blood-Glucose Meter monitoring kit Test BS daily. DX:250.00 7/28/15  Yes Genesis Graves MD   lisinopriL (PRINIVIL, ZESTRIL) 5 mg tablet TAKE 1/2 TABLET BY MOUTH EVERY DAY 3/18/22 8/26/22  Genesis Graves MD   carvediloL (COREG) 3.125 mg tablet Take 3.125 mg by mouth two (2) times daily (with meals). 8/26/22  Provider, Historical   aspirin (ASPIRIN) 325 mg tablet Take 1 Tab by mouth daily. 3/3/21 8/26/22  Genesis Graves MD        Review of Systems   Constitutional:  Negative for malaise/fatigue and weight loss. HENT:  Positive for hearing loss (on/off, L ear). Respiratory:  Negative for cough and shortness of breath. Cardiovascular:  Negative for chest pain, palpitations and leg swelling. Gastrointestinal:  Negative for abdominal pain, constipation, diarrhea, heartburn, nausea and vomiting. Musculoskeletal:  Negative for joint pain and myalgias. Skin:  Negative for rash. Neurological:  Negative for dizziness and headaches. Psychiatric/Behavioral:  Negative for depression. The patient is not nervous/anxious and does not have insomnia. Physical Exam  Constitutional:       General: He is not in acute distress. HENT:      Ears:      Comments: Right ear is: 50% occluded with hard cerumen. Left ear is: < 25% occluded with hard cerumen.    Eyes:      Conjunctiva/sclera: Conjunctivae normal.   Cardiovascular:      Rate and Rhythm: Regular rhythm. Pulses:           Dorsalis pedis pulses are 2+ on the right side and 2+ on the left side. Heart sounds: No murmur heard. Pulmonary:      Effort: Pulmonary effort is normal.      Breath sounds: Normal breath sounds. No decreased breath sounds or wheezing. Abdominal:      General: Bowel sounds are normal.      Palpations: Abdomen is soft. There is no hepatomegaly or splenomegaly. Tenderness: There is no abdominal tenderness. Musculoskeletal:      Right lower leg: Edema (2+ up to mid shin, trace to knee) present. Left lower leg: Edema (3+ almost to the knee, including foot) present. Feet:      Right foot:      Skin integrity: Skin integrity normal.      Toenail Condition: Right toenails are long. Left foot:      Skin integrity: Skin integrity normal.      Toenail Condition: Left toenails are long.       Comments:      Black nail discoloration on R 1st and L 4th toe       Left foot Filament test: normal sensation        Right foot Filament test: normal sensation   Psychiatric:         Mood and Affect: Mood and affect normal.         Behavior: Behavior normal.     Visit Vitals  BP 94/60   Pulse 78   Temp 97.9 °F (36.6 °C) (Temporal)   Resp 18   Ht 5' 7\" (1.702 m)   Wt 172 lb (78 kg)   SpO2 98%   BMI 26.94 kg/m²       Candi Dorsey MD

## 2022-08-26 NOTE — ASSESSMENT & PLAN NOTE
Asymptomatic, started chemo, tolerating well, monitored by specialist. No acute findings meriting change in the plan

## 2022-08-26 NOTE — ASSESSMENT & PLAN NOTE
Stable, not ideally controlled, family has compression socks and considering, will increase lasix dose and reassess, reviewed keeping feet elevated.  Will check labs in 1 month

## 2022-08-26 NOTE — ASSESSMENT & PLAN NOTE
well controlled, continue current treatment, likely to well controlled, will continue w/ low dose BB for now due to recent MI

## 2022-08-26 NOTE — ASSESSMENT & PLAN NOTE
stable, asymptomatic. BP is well controlled, lipids are well controlled. Continue: current plan pending review of labs. BP is low, will remain on BB as he is asymptomatic and reviewed cardiac benefits.

## 2022-08-28 LAB
ALBUMIN/CREAT UR: 10 MG/G CREAT (ref 0–29)
CREAT UR-MCNC: 47.2 MG/DL
MICROALBUMIN UR-MCNC: 4.9 UG/ML

## 2022-10-02 RX ORDER — FUROSEMIDE 40 MG/1
TABLET ORAL
Qty: 45 TABLET | Refills: 0 | OUTPATIENT
Start: 2022-10-02

## 2022-10-17 ENCOUNTER — TELEPHONE (OUTPATIENT)
Dept: INTERNAL MEDICINE CLINIC | Age: 62
End: 2022-10-17

## 2022-10-17 RX ORDER — GLIPIZIDE 5 MG/1
5 TABLET, FILM COATED, EXTENDED RELEASE ORAL DAILY
Qty: 90 TABLET | Refills: 0 | Status: SHIPPED | OUTPATIENT
Start: 2022-10-17

## 2022-10-17 RX ORDER — DAPAGLIFLOZIN 10 MG/1
TABLET, FILM COATED ORAL
COMMUNITY
Start: 2022-09-16

## 2022-10-17 RX ORDER — FUROSEMIDE 40 MG/1
TABLET ORAL
COMMUNITY
Start: 2022-09-03

## 2022-10-17 NOTE — TELEPHONE ENCOUNTER
Received incoming fax from pts sister Jarad Bledsoe requesting Dr. Maxim López restart pt on glipizide d/t high blood sugars. Pts cardiologist added Faxiga 10 mg daily on 9/19/22. Jarad Bledsoe states pts blood sugars are still ranging 290's-320's at Dr. Ghazal Hare office (these are not fasting sugars but are at least 2 hours after last meal). Reviewed with PCP and he states he will send in the requested glipizide for patient. Jacqueline voiced understanding.

## 2022-10-17 NOTE — TELEPHONE ENCOUNTER
A1c normal in August.  Not sure if this is chemo related. Will restart medication (glipizide 5mg SR).

## 2022-11-08 DIAGNOSIS — E11.29 DIABETES MELLITUS WITH MICROALBUMINURIA (HCC): ICD-10-CM

## 2022-11-08 DIAGNOSIS — R80.9 DIABETES MELLITUS WITH MICROALBUMINURIA (HCC): ICD-10-CM

## 2022-11-08 RX ORDER — POTASSIUM CHLORIDE 1500 MG/1
TABLET, FILM COATED, EXTENDED RELEASE ORAL
Qty: 90 TABLET | Refills: 1 | Status: SHIPPED | OUTPATIENT
Start: 2022-11-08

## 2022-11-17 ENCOUNTER — OFFICE VISIT (OUTPATIENT)
Dept: PODIATRY | Age: 62
End: 2022-11-17
Payer: MEDICARE

## 2022-11-17 VITALS
HEART RATE: 91 BPM | TEMPERATURE: 96.9 F | SYSTOLIC BLOOD PRESSURE: 106 MMHG | DIASTOLIC BLOOD PRESSURE: 67 MMHG | HEIGHT: 67 IN | WEIGHT: 163.8 LBS | BODY MASS INDEX: 25.71 KG/M2

## 2022-11-17 DIAGNOSIS — B35.1 TINEA UNGUIUM: Primary | ICD-10-CM

## 2022-11-17 DIAGNOSIS — E11.42 DIABETIC SENSORIMOTOR NEUROPATHY (HCC): ICD-10-CM

## 2022-11-17 PROCEDURE — 3044F HG A1C LEVEL LT 7.0%: CPT | Performed by: PODIATRIST

## 2022-11-17 PROCEDURE — 3078F DIAST BP <80 MM HG: CPT | Performed by: PODIATRIST

## 2022-11-17 PROCEDURE — 3074F SYST BP LT 130 MM HG: CPT | Performed by: PODIATRIST

## 2022-11-17 PROCEDURE — 11721 DEBRIDE NAIL 6 OR MORE: CPT | Performed by: PODIATRIST

## 2022-11-17 PROCEDURE — 99203 OFFICE O/P NEW LOW 30 MIN: CPT | Performed by: PODIATRIST

## 2022-11-17 RX ORDER — CICLOPIROX OLAMINE 7.7 MG/G
CREAM TOPICAL 2 TIMES DAILY
Qty: 30 G | Refills: 1 | Status: SHIPPED | OUTPATIENT
Start: 2022-11-17

## 2022-11-17 RX ORDER — AMMONIUM LACTATE 12 G/100G
CREAM TOPICAL 2 TIMES DAILY
Qty: 280 G | Refills: 0 | Status: SHIPPED | OUTPATIENT
Start: 2022-11-17

## 2022-11-17 NOTE — PROGRESS NOTES
1. Have you been to the ER, urgent care clinic since your last visit? Hospitalized since your last visit? No    2. Have you seen or consulted any other health care providers outside of the [address] [place] [address] since your last visit? Include any pap smears or colon screening.  No    Chief Complaint   Patient presents with    Foot Pain     Patient's caregiver states that he had x-rays done on his feet and that he has bone spurs in bone feet    Diabetic Foot Exam

## 2022-11-28 NOTE — PROGRESS NOTES
[facility] FOOT SURGERY     Patient Name: Manoj Hanks    : 1960    Visit Date: 2022    Office Visit Note    Subjective:     Patient is a 57 y.o. male who is being seen as a new patient for diabetic pedal exam.  Patient primary care physician is Karel San MD. Patient states the last office visit with PCP was 22. Patient describes diabetes as being under control. Patient's last hemoglobin A1c was 6.1. Patient denies any overt pedal pain. Patient denies any recent pedal trauma. Patient denies any systemic signs of infection but does state nails are thick and discolored. No other lower extremity complaints at this time.     Past Medical History:   Diagnosis Date    CLL (chronic lymphocytic leukemia) ([facility] [address] 2014    Hematologist: Dr Nuzhat Gr     Diabetes [facility])     Hyperlipidemia     Hypertension     Hypoxic brain damage ([facility] [address] 2014    1 yr old, developed PNA, was hypoxic, since then has had issues     Ill-defined condition      Past Surgical History:   Procedure Laterality Date    HX COLONOSCOPY      no record available    HX COLONOSCOPY  2021    inadequate - poor prep - [place]    HX CORONARY ARTERY BYPASS GRAFT  2021    LIMA to LAD, SVG to OM1, SVG to PDA    HX HEART CATHETERIZATION  2021    significant multiple vessel disease, no intervention, referred for CABG    HX OTHER SURGICAL  3/7/16    CT guided BM bx    IR INSERT TUNL CVC W PORT OVER 5 YEARS  2022       Family History   Adopted: Yes   Problem Relation Age of Onset    COPD Mother     Lung Cancer Father         lung cancer - small oat cell    No Known Problems Sister     Diabetes Brother     Hypertension Brother     High Cholesterol Brother       Social History     Tobacco Use    Smoking status: Never    Smokeless tobacco: Never   Substance Use Topics    Alcohol use: Never     Allergies   Allergen Reactions    Pcn [Penicillins] Anaphylaxis     Prior to Admission medications Medication Sig Start Date End Date Taking? Authorizing Provider   ammonium lactate (AMLACTIN) 12 % topical cream Apply  to affected area two (2) times a day. rub in to affected area well 11/17/22  Yes Chasity Tucker DPM   ciclopirox (LOPROX) 0.77 % topical cream Apply  to affected area two (2) times a day. 11/17/22  Yes Chasity Tucker DPM   potassium chloride SR (K-TAB) 20 mEq tablet TAKE 1 TABLET BY MOUTH DAILY. 11/8/22   Yane Mayfield MD   Farxiga 10 mg tab tablet TAKE 1 TABLET BY MOUTH EVERY DAY FOR 90 DAYS 9/16/22   Provider, Historical   furosemide (LASIX) 40 mg tablet TAKE 1 TAB IN THE MORNING AND 1/2 TAB IN THE AFTERNOON 9/3/22   Provider, Historical   glipiZIDE SR (GLUCOTROL XL) 5 mg CR tablet Take 1 Tablet by mouth daily. 10/17/22   Yane Mayfield MD   prochlorperazine (COMPAZINE) 10 mg tablet TAKE 1 TABLET BY MOUTH EVERY 6 HOURS AS NEEDED FOR NAUSEA 7/5/22   Provider, Historical   ondansetron hcl (ZOFRAN) 8 mg tablet TAKE 1 TABLET BY MOUTH EVERY 8 HOURS AS NEEDED FOR UNRELIEVED NAUSEA 7/5/22   Provider, Historical   aspirin 81 mg cap  2/1/22   Provider, Historical   atorvastatin (LIPITOR) 80 mg tablet Take 1 Tablet by mouth daily. 8/26/22   Yane Mayfield MD   SITagliptin-metFORMIN (Janumet)  mg per tablet Take 1 Tablet by mouth two (2) times daily (with meals). 8/26/22   Yane Mayfield MD   ferrous sulfate (SLOW FE PO) Take  by mouth daily. Provider, Historical   glucose blood VI test strips (ONETOUCH ULTRA BLUE TEST STRIP) strip TEST DAILY. Dx:  E11.29 5/29/18   Yane Mayfield MD   Lancets misc Test BS daily. DX:250.00 2/22/17   Yane Mayfield MD   Blood-Glucose Meter monitoring kit Test BS daily. DX:250.00 7/28/15   Yane Mayfield MD       Review of Systems   Constitutional:  Negative for chills, diaphoresis, fever and malaise/fatigue. Respiratory:  Negative for cough, hemoptysis, sputum production, shortness of breath and wheezing.          No cyanosis   Cardiovascular:  Negative for chest pain, palpitations, claudication and leg swelling. Gastrointestinal:  Negative for abdominal pain, constipation, diarrhea, heartburn, nausea and vomiting. Genitourinary:  Negative for frequency. Musculoskeletal:  Positive for joint pain. Negative for back pain, myalgias and neck pain. Skin:  Negative for itching and rash. Neurological:  Positive for tingling. Negative for headaches. Alert and oriented x 4. Endo/Heme/Allergies:  Negative for polydipsia. Psychiatric/Behavioral:  Negative for depression and memory loss. The patient is not nervous/anxious. Objective:     Visit Vitals  /67 (BP 1 Location: Right upper arm, BP Patient Position: Sitting, BP Cuff Size: Adult)   Pulse 91   Temp 96.9 °F (36.1 °C) (Temporal)   Ht 5' 7\" (1.702 m)   Wt 163 lb 12.8 oz (74.3 kg)   BMI 25.65 kg/m²       GEN: Pt is AAOx4 and in NAD. No dressing noted to B/L LE. No family noted at [facility]. DERM: No wounds noted to B/L LE. Dry skin noted to B/L LE. No proximal lymphatic streaking noted to B/L LE. Toenails 1-5 B/L noted to be elongated, dystrophic and with subungual debris. Skin noted to be thin and atrophic to the bilateral lower extremity. VASC: Pedal pulses (DP/PT) diminished to B/L LE. CFT<3sec to all digits of B/L LE. No hair growth noted to the B/L LE. Skin temp is warm to cool from proximal to distal for B/L LE. Neg homas/yuni signs to B/L LE. No varicosities or telangectasias noted to B/L LE.    NEURO: Protective and epicritic sensations grossly absent to B/L LE. Paraesthesia noted to the bilateral feet. MSK: Negative pain on palpation, no gross deformities noted. Good muscle tone and bulk noted to B/L LE.    PSYCH: Cooperative with normal mood and affect. Data Review: No results found for this or any previous visit (from the past 24 hour(s)). Assessment:   1. Tinea unguium  2.  Diabetic sensorimotor neuropathy ([facility] [address]       Plan: Patient seen and evaluated in the office. Discussed and educated patient regarding his current medical condition as it pertains to his diabetes and his thick/discolored toenails. Instructed patient to monitor his feet daily for possible wound formation, be compliant with all medications and wear supportive shoe gear for wound prevention. Reviewed and discussed most recent lab work, specifically as it pertains to his diabetes. Patient verbalized understanding of all discussed and reviewed. A sharp toenail plate debridement was performed to all 10 pedal digits with a nail nipper without incident. Follow-up and Dispositions    Return in about 3 months (around 2/17/2023).             Tessa Clark DPM, CWSP, [address] [place] [address], [address] [place] [address]  Terry Tamez: [phone]  F: [phone]

## 2023-01-15 RX ORDER — GLIPIZIDE 5 MG/1
TABLET, FILM COATED, EXTENDED RELEASE ORAL
Qty: 90 TABLET | Refills: 0 | Status: SHIPPED | OUTPATIENT
Start: 2023-01-15

## 2023-01-16 NOTE — TELEPHONE ENCOUNTER
Future Appointments   Date Time Provider Elisa Karine   2/17/2023 11:00 AM Samson Resendiz MD [facility] AMB   2/17/2023  2:00 PM Marjory Landau, MD REP BS AMB   2/20/2023  8:30 AM Cecil Benitez DPM RPP BS AMB

## 2023-02-13 RX ORDER — GLIPIZIDE 5 MG/1
TABLET, FILM COATED, EXTENDED RELEASE ORAL
Qty: 90 TABLET | Refills: 1 | Status: SHIPPED | OUTPATIENT
Start: 2023-02-13

## 2023-02-13 RX ORDER — FUROSEMIDE 40 MG/1
TABLET ORAL
Qty: 45 TABLET | Refills: 1 | Status: SHIPPED | OUTPATIENT
Start: 2023-02-13

## 2023-02-14 NOTE — TELEPHONE ENCOUNTER
Future Appointments   Date Time Provider Elisa Karine   2/17/2023 11:00 AM Wallace Ochoa MD [facility] AMB   2/17/2023  1:00 PM CATINA Sorensen BS AMB   2/20/2023  8:30 AM Elaina Montalvo DPM RPP BS AMB

## 2023-02-17 ENCOUNTER — OFFICE VISIT (OUTPATIENT)
Dept: INTERNAL MEDICINE CLINIC | Age: 63
End: 2023-02-17

## 2023-02-17 ENCOUNTER — OFFICE VISIT (OUTPATIENT)
Dept: ENT CLINIC | Age: 63
End: 2023-02-17
Payer: MEDICARE

## 2023-02-17 VITALS
OXYGEN SATURATION: 96 % | TEMPERATURE: 98.7 F | HEIGHT: 67 IN | BODY MASS INDEX: 26.68 KG/M2 | DIASTOLIC BLOOD PRESSURE: 59 MMHG | RESPIRATION RATE: 18 BRPM | SYSTOLIC BLOOD PRESSURE: 113 MMHG | HEART RATE: 80 BPM | WEIGHT: 170 LBS

## 2023-02-17 VITALS
SYSTOLIC BLOOD PRESSURE: 118 MMHG | RESPIRATION RATE: 18 BRPM | WEIGHT: 170 LBS | BODY MASS INDEX: 26.68 KG/M2 | DIASTOLIC BLOOD PRESSURE: 78 MMHG | HEIGHT: 67 IN | HEART RATE: 84 BPM | OXYGEN SATURATION: 97 %

## 2023-02-17 DIAGNOSIS — I10 HYPERTENSION, ESSENTIAL: ICD-10-CM

## 2023-02-17 DIAGNOSIS — I25.10 CORONARY ARTERY DISEASE INVOLVING NATIVE CORONARY ARTERY OF NATIVE HEART WITHOUT ANGINA PECTORIS: ICD-10-CM

## 2023-02-17 DIAGNOSIS — R60.0 BILATERAL LOWER EXTREMITY EDEMA: ICD-10-CM

## 2023-02-17 DIAGNOSIS — E11.29 DIABETES MELLITUS WITH MICROALBUMINURIA (HCC): ICD-10-CM

## 2023-02-17 DIAGNOSIS — Z71.89 ADVANCED CARE PLANNING/COUNSELING DISCUSSION: ICD-10-CM

## 2023-02-17 DIAGNOSIS — Z12.5 PROSTATE CANCER SCREENING: ICD-10-CM

## 2023-02-17 DIAGNOSIS — J30.2 SEASONAL ALLERGIC RHINITIS, UNSPECIFIED TRIGGER: Primary | ICD-10-CM

## 2023-02-17 DIAGNOSIS — H61.23 IMPACTED CERUMEN OF BOTH EARS: ICD-10-CM

## 2023-02-17 DIAGNOSIS — C91.10 CLL (CHRONIC LYMPHOCYTIC LEUKEMIA) (HCC): ICD-10-CM

## 2023-02-17 DIAGNOSIS — E78.00 PURE HYPERCHOLESTEROLEMIA: ICD-10-CM

## 2023-02-17 DIAGNOSIS — Z23 ENCOUNTER FOR IMMUNIZATION: ICD-10-CM

## 2023-02-17 DIAGNOSIS — G93.1 HYPOXIC BRAIN DAMAGE (HCC): ICD-10-CM

## 2023-02-17 DIAGNOSIS — Z00.00 MEDICARE ANNUAL WELLNESS VISIT, SUBSEQUENT: Primary | ICD-10-CM

## 2023-02-17 DIAGNOSIS — H90.3 SENSORINEURAL HEARING LOSS (SNHL) OF BOTH EARS: ICD-10-CM

## 2023-02-17 DIAGNOSIS — R80.9 DIABETES MELLITUS WITH MICROALBUMINURIA (HCC): ICD-10-CM

## 2023-02-17 RX ORDER — FLUTICASONE PROPIONATE 50 MCG
2 SPRAY, SUSPENSION (ML) NASAL DAILY
Qty: 1 EACH | Refills: 2 | Status: SHIPPED | OUTPATIENT
Start: 2023-02-17

## 2023-02-17 NOTE — PROGRESS NOTES
Diya Ramirez is a 57 y.o. male who was seen in clinic today (2/17/2023) for a full physical.      Assessment & Plan:   Below is the assessment and plan developed based on review of pertinent history, physical exam, labs, studies, and medications. 1. Medicare annual wellness visit, subsequent  2. Advanced care planning/counseling discussion  3. Encounter for immunization  4. Prostate cancer screening  -     PSA SCREENING (SCREENING); Future  5. Diabetes mellitus with microalbuminuria ([facility]  Assessment & Plan:  Well controlled, no changes pending review of labs but with addition of Budd Felecia can likely stop Glipizide   Orders:  -     METABOLIC PANEL, COMPREHENSIVE; Future  -     LIPID PANEL; Future  -     HEMOGLOBIN A1C WITH EAG; Future  6. Coronary artery disease involving native coronary artery of native heart without angina pectoris  Assessment & Plan:  asymptomatic. BP is well controlled, lipids are well controlled. Continue current plan per specialist.   Orders:  -     METABOLIC PANEL, COMPREHENSIVE; Future  -     LIPID PANEL; Future  7. Hypertension, essential  Assessment & Plan:  at goal, continue current treatment pending review of labs    Orders:  -     METABOLIC PANEL, COMPREHENSIVE; Future  8. Pure hypercholesterolemia  Assessment & Plan:  well controlled, continue current treatment pending review of labs    Orders:  -     METABOLIC PANEL, COMPREHENSIVE; Future  -     LIPID PANEL; Future  9. Bilateral lower extremity edema  Assessment & Plan:  Chronic, fluctuating, will offer increase in lasix after labs reviewed. No obvious medication or cardiac etiology   Orders:  -     METABOLIC PANEL, COMPREHENSIVE; Future  10. CLL (chronic lymphocytic leukemia) ([facility]  Assessment & Plan:  Asymptomatic, monitored by specialist. No acute findings meriting change in the plan  11.  Hypoxic brain damage [facility])  Assessment & Plan:  Stable, no changes, will monitor         Follow-up and Dispositions    Return in about 6 months (around 8/17/2023) for Regular follow up. Subjective:   Jerman Riojas is here today for a full physical.      Annual Wellness Visit- Subsequent Visit    Since last visit: completed chemo for CLL. Weight did decrease by 15 lbs but back to baseline. Cardiology started him on Farxiga    The following acute and/or chronic problems were addressed today:    Endocrine Review  He is seen for diabetes. Testing: is not performed. He reports medication compliance: compliant all of the time. Medication side effects: none. Diabetic diet compliance: compliant most of the time. Cardiovascular Review  The patient has CAD, hypertension, hyperlipidemia and lower extremity edema. He reports taking medications as instructed, no medication side effects noted, patient does not perform home BP monitoring. He denies orthopnea, SOB/MCKEON, or PND. End of Life Planning: This was discussed with him today and he has an advanced directive - a copy HAS NOT been provided. Reviewed DNR/DNI and patient is not interested. Depression Screen:  3 most recent PHQ Screens 2/17/2023   PHQ Not Done -   Little interest or pleasure in doing things Not at all   Feeling down, depressed, irritable, or hopeless Not at all   Total Score PHQ 2 0         Fall Risk:   Fall Risk Assessment, last 12 mths 3/8/2021   Able to walk? Yes   Fall in past 12 months? 0   Do you feel unsteady? 1   Are you worried about falling 0   Is the gait abnormal? 0       Abuse Screen:  Abuse Screening Questionnaire 8/31/2020   Do you ever feel afraid of your partner? N   Are you in a relationship with someone who physically or mentally threatens you? Y   Is it safe for you to go home?  Y       Do you average more than 2 drinks per night or 14 drinks a week: No    On any one occasion in the past three months have you have had more than 4 drinks containing alcohol:  No        Health Maintenance:   Daily Aspirin: yes  AAA Screening: n/a: never smoked    Immunizations:   Covid: up to date   Influenza: up to date   Tetanus: up to date   Shingles: records requested ([facility])   Pneumonia: up to date  Cancer screening:   Prostate: guidelines reviewed, will do today  Colon: guidelines reviewed, up to date    Functional Ability and Level of Safety:    Hearing:  hearing is stable, not ideal, is due to get his ears cleaned out, has appointment w/ ENT    Cognition Screen:   Has your family/caregiver stated any concerns about your memory: no  Cognitive Screening: deferred due to time     Ambulation: with no difficulty     Activities of Daily Living: The home contains: no safety equipment. Patient needs help with:  transportation and managing money  Exercise: moderately active    Adult Nutrition Screen:  No risk factors noted. Patient Care Team:  Chayo Nguyen MD as PCP - General (Internal Medicine Physician)  Chayo Nguyen MD as PCP - [facility] Empaneled Provider  Saeed Leone MD (Hematology and Oncology)  Jessica Wills MD as Physician (Ophthalmology)  Ruthy Feliciano DO as Physician (Dermatology Physician)  Riley Samano MD (Cardiovascular Disease Physician)  Juanis Us MD (Cardiothoracic Surgery)       The following sections were reviewed & updated as appropriate: Problem List, Allergies, PMH, PSH, FH, and SH. Prior to Admission medications    Medication Sig Start Date End Date Taking? Authorizing Provider   furosemide (LASIX) 40 mg tablet TAKE 1 TAB IN THE MORNING AND 1/2 TAB IN THE AFTERNOON 2/13/23  Yes Chayo Nguyen MD   glipiZIDE SR (GLUCOTROL XL) 5 mg CR tablet TAKE 1 TABLET BY MOUTH EVERY DAY 2/13/23  Yes Chayo Nguyen MD   ammonium lactate (AMLACTIN) 12 % topical cream Apply  to affected area two (2) times a day. rub in to affected area well 11/17/22  Yes Ginna Feliciano DPM   ciclopirox (LOPROX) 0.77 % topical cream Apply  to affected area two (2) times a day.  11/17/22 Yes Anne Obregon DPM   potassium chloride SR (K-TAB) 20 mEq tablet TAKE 1 TABLET BY MOUTH DAILY. 11/8/22  Yes Anival Matos MD   Farxiga 10 mg tab tablet TAKE 1 TABLET BY MOUTH EVERY DAY FOR 90 DAYS 9/16/22  Yes Provider, Historical   prochlorperazine (COMPAZINE) 10 mg tablet TAKE 1 TABLET BY MOUTH EVERY 6 HOURS AS NEEDED FOR NAUSEA 7/5/22  Yes Provider, Historical   ondansetron hcl (ZOFRAN) 8 mg tablet TAKE 1 TABLET BY MOUTH EVERY 8 HOURS AS NEEDED FOR UNRELIEVED NAUSEA 7/5/22  Yes Provider, Historical   aspirin 81 mg cap  2/1/22  Yes Provider, Historical   atorvastatin (LIPITOR) 80 mg tablet Take 1 Tablet by mouth daily. 8/26/22  Yes Anival Matos MD   SITagliptin-metFORMIN (Janumet)  mg per tablet Take 1 Tablet by mouth two (2) times daily (with meals). 8/26/22  Yes Anival Matos MD   ferrous sulfate (SLOW FE PO) Take  by mouth daily. Yes Provider, Historical   glucose blood VI test strips (ONETOUCH ULTRA BLUE TEST STRIP) strip TEST DAILY. Dx:  E11.29 5/29/18  Yes Anival Matos MD   Lancets misc Test BS daily. DX:250.00 2/22/17  Yes Anival Matos MD   Blood-Glucose Meter monitoring kit Test BS daily. DX:250.00 7/28/15  Yes Anival Matos MD          Review of Systems   Constitutional:  Negative for malaise/fatigue and weight loss. Respiratory:  Negative for cough and shortness of breath. Cardiovascular:  Negative for chest pain, palpitations and leg swelling. Gastrointestinal:  Negative for abdominal pain, constipation, diarrhea, heartburn, nausea and vomiting. Musculoskeletal:  Negative for joint pain and myalgias. Skin:  Negative for rash. Neurological:  Negative for dizziness and headaches. Psychiatric/Behavioral:  Negative for depression. The patient is not nervous/anxious and does not have insomnia. Objective:   Physical Exam  Constitutional:       General: He is not in acute distress.   Eyes:      Conjunctiva/sclera: Conjunctivae normal.   Cardiovascular:      Rate and Rhythm: Regular rhythm. Pulses:           Dorsalis pedis pulses are 2+ on the right side and 2+ on the left side. Heart sounds: No murmur heard. Pulmonary:      Effort: Pulmonary effort is normal.      Breath sounds: Normal breath sounds. No decreased breath sounds or wheezing. Abdominal:      General: Bowel sounds are normal.      Palpations: Abdomen is soft. There is no hepatomegaly or splenomegaly. Tenderness: There is no abdominal tenderness. Musculoskeletal:      Right lower leg: No edema. Left lower leg: No edema.    Feet:      Right foot:      Skin integrity: Skin integrity normal.      Toenail Condition: Right toenails are normal.      Left foot:      Skin integrity: Skin integrity normal.      Toenail Condition: Left toenails are normal.      Comments:        Left foot Filament test: normal sensation        Right foot Filament test: normal sensation   Psychiatric:         Mood and Affect: Mood and affect normal.         Behavior: Behavior normal.          Visit Vitals  BP (!) 113/59   Pulse 80   Temp 98.7 °F (37.1 °C) (Temporal)   Resp 18   Ht 5' 7\" (1.702 m)   Wt 170 lb (77.1 kg)   SpO2 96%   BMI 26.63 kg/m²         Jayson Agrawal MD

## 2023-02-17 NOTE — PATIENT INSTRUCTIONS
Medicare Wellness Visit, Male    The best way to live healthy is to have a lifestyle where you eat a well-balanced diet, exercise regularly, limit alcohol use, and quit all forms of tobacco/nicotine, if applicable. Regular preventive services are another way to keep healthy. Preventive services (vaccines, screening tests, monitoring & exams) can help personalize your care plan, which helps you manage your own care. Screening tests can find health problems at the earliest stages, when they are easiest to treat. [facility] follows the current, evidence-based guidelines published by the [facility] ([facility]) when recommending preventive services for our patients. Because we follow these guidelines, sometimes recommendations change over time as research supports it. (For example, a prostate screening blood test is no longer routinely recommended for men with no symptoms). Of course, you and your doctor may decide to screen more often for some diseases, based on your risk and co-morbidities (chronic disease you are already diagnosed with). Preventive services for you include:  - Medicare offers their members a free annual wellness visit, which is time for you and your primary care provider to discuss and plan for your preventive service needs.  Take advantage of this benefit every year!    -Over the age of 72 should receive the recommended pneumonia vaccines.   -All adults should have a flu vaccine yearly.  -All adults should receive a tetanus vaccine every 10 years.  -Over the age of 48 should receive the shingles vaccines.    -All adults should be screened once for Hepatitis C.  -All adults age 38-68 who are overweight should have a diabetes screening test once every three years.   -Other screening tests & preventive services for persons with diabetes include: an eye exam to screen for diabetic retinopathy, a kidney function test, a foot exam, and stricter control over your cholesterol.   -Cardiovascular screening for adults with routine risk involves an electrocardiogram (ECG) at intervals determined by the provider.     -Colorectal cancer screening should be done for adults age 43-69 with no increased risk factors for colorectal cancer. There are a number of acceptable methods of screening for this type of cancer. Each test has its own benefits and drawbacks. Discuss with your provider what is most appropriate for you during your annual wellness visit. The different tests include: colonoscopy (considered the best screening method), a fecal occult blood test, a fecal DNA test, and sigmoidoscopy.    -Lung cancer screening is recommended annually with a low dose CT scan for adults between age 54 and 68, who have smoked at least 30 pack years (equivalent of 1 pack per day for 30 days), and who is a current smoker or quit less than 15 years ago. -An Abdominal Aortic Aneurysm (AAA) Screening is recommended for men age 73-68 who has ever smoked in their lifetime.      Here is a list of your current Health Maintenance items (your personalized list of preventive services) with a due date:  Health Maintenance Due   Topic Date Due    Shingles Vaccine (2 of 2) 12/19/2019    Eye Exam  10/24/2021    COVID-19 Vaccine (4 - Booster for [employer] series) 01/31/2023

## 2023-02-17 NOTE — ASSESSMENT & PLAN NOTE
Chronic, fluctuating, will offer increase in lasix after labs reviewed.   No obvious medication or cardiac etiology

## 2023-02-17 NOTE — ACP (ADVANCE CARE PLANNING)
Advance Care Planning   Advance Care Planning (ACP) Physician/NP/PA (Provider) Conversation    Date of ACP Conversation: 02/17/23  Persons included in Conversation:  patient and family  Length of ACP Conversation in minutes: <16 minutes (Non-Billable)    Authorized Decision Maker (if patient is incapable of making informed decisions):   Named in Advance Directive or Healthcare Power of       Primary Decision Maker: Sandeep Joseph Sister - [phone]    Secondary Decision Maker: Yajairavicki Au - Other Relative - [phone]    He has an advanced directive - a copy HAS NOT been provided. Reviewed DNR/DNI and patient is not interested- elects Full Code (attempt resuscitation).        Cecilio Valle MD

## 2023-02-17 NOTE — PROGRESS NOTES
Subjective:    Dina Jensen   57 y.o.   1960     New Patient Visit  This is a 57 y.o. male who is here with his bother in law. He has intellectual disability. He is thought to have hearing issues since birth, They are unsure if he has ever had a hearing test in the past. His brother in law states that he has delayed speech issues. Brother in law states his mental capacity is equivalent to a 9-7 year old. Review of Systems  Review of Systems   Reason unable to perform ROS: cognitive level. Past Medical History:   Diagnosis Date    Cerumen impaction     CLL (chronic lymphocytic leukemia) ([facility] 06/27/2014    Hematologist: Dr Phu Mayberry     Diabetes [facility])     Ear problems     Hearing reduced     Hyperlipidemia     Hypertension     Hypoxic brain damage ([facility] 06/17/2014    1 yr old, developed PNA, was hypoxic, since then has had issues     Ill-defined condition      Past Surgical History:   Procedure Laterality Date    HX COLONOSCOPY  2010    no record available    HX COLONOSCOPY  01/11/2021    inadequate - poor prep - [place]    HX CORONARY ARTERY BYPASS GRAFT  02/08/2021    LIMA to LAD, SVG to OM1, SVG to PDA    HX HEART CATHETERIZATION  02/05/2021    significant multiple vessel disease, no intervention, referred for CABG    HX OTHER SURGICAL  3/7/16    CT guided BM bx    IR INSERT TUNL CVC W PORT OVER 5 YEARS  7/8/2022      Family History   Adopted: Yes   Problem Relation Age of Onset    COPD Mother     Lung Cancer Father         lung cancer - small oat cell    No Known Problems Sister     Diabetes Brother     Hypertension Brother     High Cholesterol Brother      Social History     Tobacco Use    Smoking status: Never    Smokeless tobacco: Never   Substance Use Topics    Alcohol use: Never      Prior to Admission medications    Medication Sig Start Date End Date Taking?  Authorizing Provider   furosemide (LASIX) 40 mg tablet TAKE 1 TAB IN THE MORNING AND 1/2 TAB IN THE AFTERNOON 2/13/23  Yes Patricia Somers Lilia Delgado MD   glipiZIDE SR (GLUCOTROL XL) 5 mg CR tablet TAKE 1 TABLET BY MOUTH EVERY DAY 2/13/23  Yes Geoffrey Romo MD   ammonium lactate (AMLACTIN) 12 % topical cream Apply  to affected area two (2) times a day. rub in to affected area well 11/17/22  Yes Charly العراقي DPM   ciclopirox (LOPROX) 0.77 % topical cream Apply  to affected area two (2) times a day. 11/17/22  Yes Charly العراقي DPM   potassium chloride SR (K-TAB) 20 mEq tablet TAKE 1 TABLET BY MOUTH DAILY. 11/8/22  Yes Geoffrey Romo MD   Farxiga 10 mg tab tablet TAKE 1 TABLET BY MOUTH EVERY DAY FOR 90 DAYS 9/16/22  Yes Provider, Historical   prochlorperazine (COMPAZINE) 10 mg tablet TAKE 1 TABLET BY MOUTH EVERY 6 HOURS AS NEEDED FOR NAUSEA 7/5/22  Yes Provider, Historical   ondansetron hcl (ZOFRAN) 8 mg tablet TAKE 1 TABLET BY MOUTH EVERY 8 HOURS AS NEEDED FOR UNRELIEVED NAUSEA 7/5/22  Yes Provider, Historical   aspirin 81 mg cap  2/1/22  Yes Provider, Historical   atorvastatin (LIPITOR) 80 mg tablet Take 1 Tablet by mouth daily. 8/26/22  Yes Geoffrey Romo MD   SITagliptin-metFORMIN (Janumet)  mg per tablet Take 1 Tablet by mouth two (2) times daily (with meals). 8/26/22  Yes Geoffrey Romo MD   ferrous sulfate (SLOW FE PO) Take  by mouth daily. Yes Provider, Historical   glucose blood VI test strips (ONETOUCH ULTRA BLUE TEST STRIP) strip TEST DAILY. Dx:  E11.29 5/29/18  Yes Geoffrey Romo MD   Lancets misc Test BS daily. DX:250.00 2/22/17  Yes Geoffrey Romo MD   Blood-Glucose Meter monitoring kit Test BS daily. DX:250.00 7/28/15  Yes Geoffrey Romo MD        Allergies   Allergen Reactions    Pcn [Penicillins] Anaphylaxis         Objective:     Visit Vitals  /78 (BP 1 Location: Left upper arm, BP Patient Position: Sitting, BP Cuff Size: Adult)   Pulse 84   Resp 18   Ht 5' 7\" (1.702 m)   Wt 170 lb (77.1 kg)   SpO2 97%   BMI 26.63 kg/m²        Physical Exam  Constitutional: General: He is not in acute distress. Appearance: Normal appearance. He is normal weight. He is not ill-appearing or toxic-appearing. HENT:      Head: Normocephalic and atraumatic. Right Ear: Tympanic membrane and external ear normal. There is impacted cerumen. Left Ear: Tympanic membrane and external ear normal. There is impacted cerumen. Nose: Congestion and rhinorrhea present. Mouth/Throat:      Mouth: Mucous membranes are moist.      Pharynx: Oropharynx is clear. No oropharyngeal exudate or posterior oropharyngeal erythema. Eyes:      Extraocular Movements: Extraocular movements intact. Conjunctiva/sclera: Conjunctivae normal.      Pupils: Pupils are equal, round, and reactive to light. Cardiovascular:      Rate and Rhythm: Normal rate and regular rhythm. Pulmonary:      Effort: Pulmonary effort is normal.      Breath sounds: Normal breath sounds. Abdominal:      General: Abdomen is flat. Palpations: Abdomen is soft. Musculoskeletal:         General: Normal range of motion. Cervical back: Normal range of motion and neck supple. No rigidity or tenderness. Lymphadenopathy:      Cervical: No cervical adenopathy. Skin:     General: Skin is warm. Capillary Refill: Capillary refill takes less than 2 seconds. Neurological:      General: No focal deficit present. Mental Status: He is alert. Gait: Gait abnormal.       Assessment/Plan:     Encounter Diagnoses   Name Primary? Seasonal allergic rhinitis, unspecified trigger Yes    Sensorineural hearing loss (SNHL) of both ears     Impacted cerumen of both ears      Orders Placed This Encounter    REFERRAL TO AUDIOLOGY    fluticasone propionate (FLONASE) 50 mcg/actuation nasal spray   -Recommend fluticasone for seasonal allergies and rhinitis  -Audiogram scheduled in March for hearing loss  -Impacted cerumen of bilateral ears was evacuated using a 7 Welsh suction catheter.  Left ear required mineral oil/docusate sloution to soften cerumen for extraction. Tolerated well.          Thank you for referring this patient,    Harley Face AGACNP-BC     [address]  ENT  [phone]

## 2023-02-17 NOTE — ASSESSMENT & PLAN NOTE
Well controlled, no changes pending review of labs but with addition of Edilma Juan Daniel can likely stop Glipizide

## 2023-02-17 NOTE — ASSESSMENT & PLAN NOTE
asymptomatic. BP is well controlled, lipids are well controlled.   Continue current plan per specialist.

## 2023-02-17 NOTE — PROGRESS NOTES
Chief Complaint   Patient presents with    Wax in Ear     VERONICA ears     New Patient     Visit Vitals  /78 (BP 1 Location: Left upper arm, BP Patient Position: Sitting, BP Cuff Size: Adult)   Pulse 84   Resp 18   Ht 5' 7\" (1.702 m)   Wt 170 lb (77.1 kg)   SpO2 97%   BMI 26.63 kg/m²

## 2023-02-18 LAB
ALBUMIN SERPL-MCNC: 4.5 G/DL (ref 3.8–4.8)
ALBUMIN/GLOB SERPL: 2.5 {RATIO} (ref 1.2–2.2)
ALP SERPL-CCNC: 162 IU/L (ref 44–121)
ALT SERPL-CCNC: 49 IU/L (ref 0–44)
AST SERPL-CCNC: 30 IU/L (ref 0–40)
BILIRUB SERPL-MCNC: 0.5 MG/DL (ref 0–1.2)
BUN SERPL-MCNC: 17 MG/DL (ref 8–27)
BUN/CREAT SERPL: 23 (ref 10–24)
CALCIUM SERPL-MCNC: 9.5 MG/DL (ref 8.6–10.2)
CHLORIDE SERPL-SCNC: 100 MMOL/L (ref 96–106)
CHOLEST SERPL-MCNC: 96 MG/DL (ref 100–199)
CO2 SERPL-SCNC: 26 MMOL/L (ref 20–29)
CREAT SERPL-MCNC: 0.74 MG/DL (ref 0.76–1.27)
EGFRCR SERPLBLD CKD-EPI 2021: 101 ML/MIN/1.73
EST. AVERAGE GLUCOSE BLD GHB EST-MCNC: 140 MG/DL
GLOBULIN SER CALC-MCNC: 1.8 G/DL (ref 1.5–4.5)
GLUCOSE SERPL-MCNC: 87 MG/DL (ref 70–99)
HBA1C MFR BLD: 6.5 % (ref 4.8–5.6)
HDLC SERPL-MCNC: 32 MG/DL
LDLC SERPL CALC-MCNC: 47 MG/DL (ref 0–99)
POTASSIUM SERPL-SCNC: 4.1 MMOL/L (ref 3.5–5.2)
PROT SERPL-MCNC: 6.3 G/DL (ref 6–8.5)
PSA SERPL-MCNC: 0.3 NG/ML (ref 0–4)
SODIUM SERPL-SCNC: 138 MMOL/L (ref 134–144)
TRIGL SERPL-MCNC: 84 MG/DL (ref 0–149)
VLDLC SERPL CALC-MCNC: 17 MG/DL (ref 5–40)

## 2023-02-20 ENCOUNTER — TELEPHONE (OUTPATIENT)
Dept: INTERNAL MEDICINE CLINIC | Age: 63
End: 2023-02-20

## 2023-02-20 ENCOUNTER — OFFICE VISIT (OUTPATIENT)
Dept: PODIATRY | Age: 63
End: 2023-02-20
Payer: MEDICARE

## 2023-02-20 VITALS
TEMPERATURE: 96.9 F | SYSTOLIC BLOOD PRESSURE: 118 MMHG | HEIGHT: 67 IN | DIASTOLIC BLOOD PRESSURE: 60 MMHG | WEIGHT: 167.8 LBS | BODY MASS INDEX: 26.34 KG/M2 | HEART RATE: 79 BPM

## 2023-02-20 DIAGNOSIS — E11.29 DIABETES MELLITUS WITH MICROALBUMINURIA (HCC): ICD-10-CM

## 2023-02-20 DIAGNOSIS — B35.1 TINEA UNGUIUM: Primary | ICD-10-CM

## 2023-02-20 DIAGNOSIS — R80.9 DIABETES MELLITUS WITH MICROALBUMINURIA (HCC): ICD-10-CM

## 2023-02-20 DIAGNOSIS — L84 CALLUS OF FOOT: ICD-10-CM

## 2023-02-20 PROCEDURE — 3078F DIAST BP <80 MM HG: CPT | Performed by: PODIATRIST

## 2023-02-20 PROCEDURE — 3074F SYST BP LT 130 MM HG: CPT | Performed by: PODIATRIST

## 2023-02-20 PROCEDURE — 2022F DILAT RTA XM EVC RTNOPTHY: CPT | Performed by: PODIATRIST

## 2023-02-20 PROCEDURE — 11056 PARNG/CUTG B9 HYPRKR LES 2-4: CPT | Performed by: PODIATRIST

## 2023-02-20 PROCEDURE — G8427 DOCREV CUR MEDS BY ELIG CLIN: HCPCS | Performed by: PODIATRIST

## 2023-02-20 PROCEDURE — G8432 DEP SCR NOT DOC, RNG: HCPCS | Performed by: PODIATRIST

## 2023-02-20 PROCEDURE — 11721 DEBRIDE NAIL 6 OR MORE: CPT | Performed by: PODIATRIST

## 2023-02-20 PROCEDURE — G8419 CALC BMI OUT NRM PARAM NOF/U: HCPCS | Performed by: PODIATRIST

## 2023-02-20 PROCEDURE — 99213 OFFICE O/P EST LOW 20 MIN: CPT | Performed by: PODIATRIST

## 2023-02-20 PROCEDURE — 3017F COLORECTAL CA SCREEN DOC REV: CPT | Performed by: PODIATRIST

## 2023-02-20 PROCEDURE — 3044F HG A1C LEVEL LT 7.0%: CPT | Performed by: PODIATRIST

## 2023-02-20 NOTE — PROGRESS NOTES
Results released to patient via [email or web address]. All labs are stable or at goal for him except for increase in ALT and Alk Phos. Will try to stop Glipizide and re-evaluate.

## 2023-02-20 NOTE — PROGRESS NOTES
1. Have you been to the ER, urgent care clinic since your last visit? Hospitalized since your last visit? No    2. Have you seen or consulted any other health care providers outside of the [address] [place] [address] since your last visit? Include any pap smears or colon screening.  No    Chief Complaint   Patient presents with    Diabetic Foot Exam    Callouses

## 2023-02-20 NOTE — PROGRESS NOTES
[facility] FOOT SURGERY     Patient Name: Rox Ramsay    : 1960    Visit Date: 2023    Office Visit Note    Subjective:         Patient is a 57 y.o. male who is being seen as a new patient for diabetic pedal exam.  Patient primary care physician is Noah Bellamy MD. Patient states the last office visit with PCP was 23. Patient describes diabetes as being under control. Patient's last hemoglobin A1c was 6.5. Patient denies any overt pedal pain. Patient denies any recent pedal trauma. Patient denies any systemic signs of infection but does state nails are thick and discolored. Patient does complain about pain to the left foot due to calluses.     Past Medical History:   Diagnosis Date    Cerumen impaction     CLL (chronic lymphocytic leukemia) ([facility] [address] 2014    Hematologist: Dr Obie Narayanan     Diabetes [facility])     Ear problems     Hearing reduced     Hyperlipidemia     Hypertension     Hypoxic brain damage ([facility] [address] 2014    1 yr old, developed PNA, was hypoxic, since then has had issues     Ill-defined condition      Past Surgical History:   Procedure Laterality Date    HX COLONOSCOPY      no record available    HX COLONOSCOPY  2021    inadequate - poor prep - [place]    HX CORONARY ARTERY BYPASS GRAFT  2021    LIMA to LAD, SVG to OM1, SVG to PDA    HX HEART CATHETERIZATION  2021    significant multiple vessel disease, no intervention, referred for CABG    HX OTHER SURGICAL  3/7/16    CT guided BM bx    IR INSERT TUNL CVC W PORT OVER 5 YEARS  2022       Family History   Adopted: Yes   Problem Relation Age of Onset    COPD Mother     Lung Cancer Father         lung cancer - small oat cell    No Known Problems Sister     Diabetes Brother     Hypertension Brother     High Cholesterol Brother       Social History     Tobacco Use    Smoking status: Never    Smokeless tobacco: Never   Substance Use Topics    Alcohol use: Never     Allergies   Allergen Reactions    Pcn [Penicillins] Anaphylaxis     Prior to Admission medications    Medication Sig Start Date End Date Taking? Authorizing Provider   fluticasone propionate (FLONASE) 50 mcg/actuation nasal spray 2 Sprays by Both Nostrils route daily. 2/17/23   Young Osorio NP   furosemide (LASIX) 40 mg tablet TAKE 1 TAB IN THE MORNING AND 1/2 TAB IN THE AFTERNOON 2/13/23   Mikie Cannon MD   glipiZIDE SR (GLUCOTROL XL) 5 mg CR tablet TAKE 1 TABLET BY MOUTH EVERY DAY 2/13/23   Mikie Cannon MD   ammonium lactate (AMLACTIN) 12 % topical cream Apply  to affected area two (2) times a day. rub in to affected area well 11/17/22   Kala Ordonez DPM   ciclopirox (LOPROX) 0.77 % topical cream Apply  to affected area two (2) times a day. 11/17/22   Kala Ordonez DPM   potassium chloride SR (K-TAB) 20 mEq tablet TAKE 1 TABLET BY MOUTH DAILY. 11/8/22   Mikie Cannon MD   Farxiga 10 mg tab tablet TAKE 1 TABLET BY MOUTH EVERY DAY FOR 90 DAYS 9/16/22   Provider, Historical   prochlorperazine (COMPAZINE) 10 mg tablet TAKE 1 TABLET BY MOUTH EVERY 6 HOURS AS NEEDED FOR NAUSEA 7/5/22   Provider, Historical   ondansetron hcl (ZOFRAN) 8 mg tablet TAKE 1 TABLET BY MOUTH EVERY 8 HOURS AS NEEDED FOR UNRELIEVED NAUSEA 7/5/22   Provider, Historical   aspirin 81 mg cap  2/1/22   Provider, Historical   atorvastatin (LIPITOR) 80 mg tablet Take 1 Tablet by mouth daily. 8/26/22   Mikie Cannon MD   SITagliptin-metFORMIN (Janumet)  mg per tablet Take 1 Tablet by mouth two (2) times daily (with meals). 8/26/22   Mikie Cannon MD   ferrous sulfate (SLOW FE PO) Take  by mouth daily. Provider, Historical   glucose blood VI test strips (ONETOUCH ULTRA BLUE TEST STRIP) strip TEST DAILY. Dx:  E11.29 5/29/18   Mikie Cannon MD   Lancets misc Test BS daily. DX:250.00 2/22/17   Mikie Cannon MD   Blood-Glucose Meter monitoring kit Test BS daily. DX:250.00 7/28/15   Mikie Cannon MD Review of Systems   Constitutional:  Negative for chills, diaphoresis, fever and malaise/fatigue. Respiratory:  Negative for cough, hemoptysis, sputum production, shortness of breath and wheezing. No cyanosis   Cardiovascular:  Negative for chest pain, palpitations, claudication and leg swelling. Gastrointestinal:  Negative for abdominal pain, constipation, diarrhea, heartburn, nausea and vomiting. Genitourinary:  Negative for frequency. Musculoskeletal:  Positive for joint pain. Negative for back pain, myalgias and neck pain. Skin:  Negative for itching and rash. Neurological:  Positive for tingling. Negative for headaches. Alert and oriented x 4. Endo/Heme/Allergies:  Negative for polydipsia. Psychiatric/Behavioral:  Negative for depression and memory loss. The patient is not nervous/anxious. Objective:     Visit Vitals  /60 (BP 1 Location: Left upper arm, BP Patient Position: Sitting, BP Cuff Size: Adult)   Pulse 79   Temp 96.9 °F (36.1 °C) (Temporal)   Ht 5' 7\" (1.702 m)   Wt 167 lb 12.8 oz (76.1 kg)   BMI 26.28 kg/m²       GEN: Pt is AAOx4 and in NAD. No dressing noted to B/L LE. No family noted at [facility]. DERM: No wounds noted to B/L LE. Dry skin noted to B/L LE. No proximal lymphatic streaking noted to B/L LE. Toenails 1-5 B/L noted to be elongated, dystrophic and with subungual debris. Skin noted to be thin and atrophic to the bilateral lower extremity. VASC: Pedal pulses (DP/PT) diminished to B/L LE. CFT<3sec to all digits of B/L LE. No hair growth noted to the B/L LE. Skin temp is warm to cool from proximal to distal for B/L LE. Neg homas/yuni signs to B/L LE. No varicosities or telangectasias noted to B/L LE.    NEURO: Protective and epicritic sensations grossly absent to B/L LE. Paraesthesia noted to the bilateral feet. MSK: Negative pain on palpation, no gross deformities noted.  Good muscle tone and bulk noted to B/L LE.    PSYCH: Cooperative with normal mood and affect. Data Review: No results found for this or any previous visit (from the past 24 hour(s)). Assessment:   Diagnoses and all orders for this visit:    1. Tinea unguium    2. Diabetes mellitus with microalbuminuria (HCC)    3. Callus of foot       Plan:   Patient seen and evaluated in the office. Discussed and educated patient regarding his current medical condition as it pertains to his diabetes and his thick/discolored toenails. Instructed patient to monitor his feet daily for possible wound formation, be compliant with all medications and wear supportive shoe gear for wound prevention. Reviewed and discussed most recent lab work, specifically as it pertains to his diabetes. Patient verbalized understanding of all discussed and reviewed. A sharp toenail plate debridement was performed to all 10 pedal digits with a nail nipper without incident. Using a 15 blade a total of 2 hyperkeratosis of the left foot was removed in a paring fashion down to level of healthy tissue. Patient tolerated procedure well       Follow-up and Dispositions    Return in about 3 months (around 5/20/2023).           Frederick Chapa DPM, CW, [address] [place] [address] [facility], [facility]  [place] Hahn: [phone]  F: [phone]

## 2023-02-22 ENCOUNTER — TELEPHONE (OUTPATIENT)
Dept: INTERNAL MEDICINE CLINIC | Age: 63
End: 2023-02-22

## 2023-02-22 NOTE — TELEPHONE ENCOUNTER
Spoke with sister (Jacqueline/ASHWIN) she will call back with patient to update abuse screening or wait until next visit.

## 2023-03-12 DIAGNOSIS — R80.9 DIABETES MELLITUS WITH MICROALBUMINURIA (HCC): ICD-10-CM

## 2023-03-12 DIAGNOSIS — E11.29 DIABETES MELLITUS WITH MICROALBUMINURIA (HCC): ICD-10-CM

## 2023-03-12 DIAGNOSIS — E78.00 PURE HYPERCHOLESTEROLEMIA: ICD-10-CM

## 2023-03-12 RX ORDER — SITAGLIPTIN AND METFORMIN HYDROCHLORIDE 500; 50 MG/1; MG/1
TABLET, FILM COATED ORAL
Qty: 180 TABLET | Refills: 1 | Status: SHIPPED | OUTPATIENT
Start: 2023-03-12

## 2023-03-12 RX ORDER — ATORVASTATIN CALCIUM 80 MG/1
TABLET, FILM COATED ORAL
Qty: 90 TABLET | Refills: 1 | Status: SHIPPED | OUTPATIENT
Start: 2023-03-12

## 2023-03-12 NOTE — TELEPHONE ENCOUNTER
Future Appointments   Date Time Provider Elisa Karine   3/17/2023  1:15 PM Janice Dunbar BS AMB   5/22/2023  8:30 AM Rivka Lay DPM [facility]PP BS AMB   8/18/2023 11:00 AM Vira Reis MD [facility] NOEL BS AMB

## 2023-03-14 RX ORDER — FUROSEMIDE 40 MG/1
TABLET ORAL
Qty: 135 TABLET | Refills: 1 | Status: SHIPPED | OUTPATIENT
Start: 2023-03-14

## 2023-03-15 NOTE — TELEPHONE ENCOUNTER
Future Appointments   Date Time Provider Elisa Milton   3/17/2023  1:15 PM Janice Del Rio BS AMB   5/22/2023  8:30 AM Nico Michele DPM RPP BS AMB   8/18/2023 11:00 AM Jane Abdi MD [facility] NOEL BS AMB

## 2023-03-17 ENCOUNTER — OFFICE VISIT (OUTPATIENT)
Dept: ENT CLINIC | Age: 63
End: 2023-03-17

## 2023-03-17 DIAGNOSIS — H90.3 SENSORINEURAL HEARING LOSS (SNHL) OF BOTH EARS: Primary | ICD-10-CM

## 2023-03-17 NOTE — PROGRESS NOTES
Patient name: Gema Powell   : 1960   MRN: [id number]   Appointment type: Audiogram    Patient is a very pleasant 57 y.o. male  referred by James Butler NP for an audiological evaluation. Patient was initially seen by NP eDnise Tucker on 2023 for concerns for allergies and hearing issues. At that time, impacted cerumen was noted bilaterally and was debrided. Patient's medical history is significant for hypoxic brain damage following pneumonia, and intellectual disability. He is accompanied to today's appointment by his brother in law, who reports patient has had bilateral hearing loss likely since childhood. Per brother in law, patient has not had audiogram in at least 5 years. He is believed to have cognitive age of 9-7 year old. There is no family history of hearing loss. Patient has no reported history of noise exposure or head trauma and denies any ear pain or ear fullness/pressure. Patient himself does report that he feels his hearing is worse in the left ear. Otoscopy: canals clear, tympanic membrane intact bilaterally  Tympanometry: RE Type B, flat  LE Type A, normal    SRT: RE Speech Awareness Threshold (SAT) was observed at 45 dBHL LE Speech Awareness Threshold (SAT) was observed at 80 dBHL    Patient unable to repeat words back as speech approached threshold but raised his hand to indicate awareness of speech. WRS (NU-CHIPS): RE Good in quiet when words were presented at 85 dBHL. WRS (NU-CHIPS): LE Good in quiet when words were presented at 100 dBHL. (75 dBHL contralateral masking)    Pure tone audiometry:   RE: Moderate-severe flat sensorineural hearing loss (moderate at 1000Hz)   LE: severe to profound likely sensorineural hearing loss (no response at bone conduction max 2000-4000Hz; patient fatigued and could not obtain masked bone thresholds at 500 and 1000Hz)    Asymmetry noted, L>R. Results should be considered of fair reliability. Explained results to patient and brother in law. Patient has bilateral hearing loss and would benefit from hearing aids. Did discuss that unfortunately Medicare does not offer hearing aid coverage at this time; brother in law stated that patient has many other ongoing medical bills due to recent heart attack, surgery, and ongoing cancer treatments. [address] application; reviewed portions to complete. Brother in law stated that he would fill out patient's portion on his behalf and return it to our office. Can complete remainder of application at that time and send in. If approved, will plan to schedule bilateral earmold impressions and then schedule fitting once earmolds arrive in office. Plan:   Follow-up with NP as needed. Complete [employer].      Janice Brown   Doctor of Audiology

## 2023-03-17 NOTE — Clinical Note
MARY. Briefly discussed hearing aids with patient's VERONICA; they are interested in filling out [employer] so I provided that today

## 2023-03-20 RX ORDER — AMMONIUM LACTATE 12 G/100G
CREAM TOPICAL 2 TIMES DAILY
Qty: 280 G | Refills: 0 | Status: SHIPPED | OUTPATIENT
Start: 2023-03-20

## 2023-05-17 DIAGNOSIS — E11.29 TYPE 2 DIABETES MELLITUS WITH OTHER DIABETIC KIDNEY COMPLICATION (HCC): ICD-10-CM

## 2023-05-17 RX ORDER — POTASSIUM CHLORIDE 1500 MG/1
TABLET, FILM COATED, EXTENDED RELEASE ORAL
Qty: 90 TABLET | Refills: 1 | Status: SHIPPED | OUTPATIENT
Start: 2023-05-17

## 2023-05-17 NOTE — TELEPHONE ENCOUNTER
Chief Complaint   Patient presents with    Medication Refill     Last Appointment with Dr. Geri Soto: 23    Future Appointments   Date Time Provider Sandy Clayton   2023  8:30 AM Brook Arnold DPM RPJOANA BS AMB   2023 11:00 AM MD AIDEE Mccormick BS AMB        Elva Palmer

## 2023-05-22 ENCOUNTER — OFFICE VISIT (OUTPATIENT)
Age: 63
End: 2023-05-22
Payer: MEDICARE

## 2023-05-22 VITALS
TEMPERATURE: 97.7 F | HEART RATE: 72 BPM | DIASTOLIC BLOOD PRESSURE: 63 MMHG | BODY MASS INDEX: 26.28 KG/M2 | SYSTOLIC BLOOD PRESSURE: 128 MMHG | HEIGHT: 67 IN

## 2023-05-22 DIAGNOSIS — L84 CORNS AND CALLOSITIES: ICD-10-CM

## 2023-05-22 DIAGNOSIS — B35.1 TINEA UNGUIUM: Primary | ICD-10-CM

## 2023-05-22 DIAGNOSIS — E11.42 TYPE 2 DIABETES MELLITUS WITH DIABETIC POLYNEUROPATHY, WITHOUT LONG-TERM CURRENT USE OF INSULIN (HCC): ICD-10-CM

## 2023-05-22 PROCEDURE — 2022F DILAT RTA XM EVC RTNOPTHY: CPT | Performed by: PODIATRIST

## 2023-05-22 PROCEDURE — G8419 CALC BMI OUT NRM PARAM NOF/U: HCPCS | Performed by: PODIATRIST

## 2023-05-22 PROCEDURE — 3017F COLORECTAL CA SCREEN DOC REV: CPT | Performed by: PODIATRIST

## 2023-05-22 PROCEDURE — 3044F HG A1C LEVEL LT 7.0%: CPT | Performed by: PODIATRIST

## 2023-05-22 PROCEDURE — 3078F DIAST BP <80 MM HG: CPT | Performed by: PODIATRIST

## 2023-05-22 PROCEDURE — 11056 PARNG/CUTG B9 HYPRKR LES 2-4: CPT | Performed by: PODIATRIST

## 2023-05-22 PROCEDURE — G8427 DOCREV CUR MEDS BY ELIG CLIN: HCPCS | Performed by: PODIATRIST

## 2023-05-22 PROCEDURE — 1036F TOBACCO NON-USER: CPT | Performed by: PODIATRIST

## 2023-05-22 PROCEDURE — 1123F ACP DISCUSS/DSCN MKR DOCD: CPT | Performed by: PODIATRIST

## 2023-05-22 PROCEDURE — 3074F SYST BP LT 130 MM HG: CPT | Performed by: PODIATRIST

## 2023-05-22 PROCEDURE — 99213 OFFICE O/P EST LOW 20 MIN: CPT | Performed by: PODIATRIST

## 2023-05-22 PROCEDURE — 11721 DEBRIDE NAIL 6 OR MORE: CPT | Performed by: PODIATRIST

## 2023-05-22 RX ORDER — AMMONIUM LACTATE 12 G/100G
CREAM TOPICAL PRN
COMMUNITY

## 2023-05-22 RX ORDER — ATORVASTATIN CALCIUM 80 MG/1
80 TABLET, FILM COATED ORAL DAILY
COMMUNITY

## 2023-05-22 RX ORDER — GLIPIZIDE 5 MG/1
5 TABLET ORAL
COMMUNITY

## 2023-05-22 RX ORDER — FUROSEMIDE 40 MG/1
40 TABLET ORAL 2 TIMES DAILY
COMMUNITY

## 2023-05-22 RX ORDER — SITAGLIPTIN AND METFORMIN HYDROCHLORIDE 500; 50 MG/1; MG/1
1 TABLET, FILM COATED ORAL 2 TIMES DAILY WITH MEALS
COMMUNITY

## 2023-05-22 RX ORDER — CICLOPIROX 7.7 MG/G
GEL TOPICAL 2 TIMES DAILY
COMMUNITY

## 2023-05-22 RX ORDER — FERROUS SULFATE 325(65) MG
325 TABLET ORAL
COMMUNITY

## 2023-05-22 RX ORDER — ONDANSETRON HYDROCHLORIDE 8 MG/1
8 TABLET, FILM COATED ORAL EVERY 8 HOURS PRN
COMMUNITY

## 2023-05-22 RX ORDER — ASPIRIN 81 MG/1
81 TABLET ORAL DAILY
COMMUNITY

## 2023-05-22 RX ORDER — PROCHLORPERAZINE MALEATE 10 MG
10 TABLET ORAL EVERY 6 HOURS PRN
COMMUNITY

## 2023-05-22 RX ORDER — FLUTICASONE PROPIONATE 50 MCG
1 SPRAY, SUSPENSION (ML) NASAL DAILY
COMMUNITY

## 2023-05-29 RX ORDER — FUROSEMIDE 40 MG/1
TABLET ORAL
COMMUNITY
Start: 2023-03-14

## 2023-05-29 RX ORDER — AMMONIUM LACTATE 12 G/100G
CREAM TOPICAL 2 TIMES DAILY
COMMUNITY
Start: 2023-03-20

## 2023-05-29 RX ORDER — GLIPIZIDE 5 MG/1
1 TABLET, FILM COATED, EXTENDED RELEASE ORAL DAILY
COMMUNITY
Start: 2023-02-13

## 2023-05-29 RX ORDER — ONDANSETRON HYDROCHLORIDE 8 MG/1
TABLET, FILM COATED ORAL
COMMUNITY
Start: 2022-07-05

## 2023-05-29 RX ORDER — ATORVASTATIN CALCIUM 80 MG/1
1 TABLET, FILM COATED ORAL DAILY
COMMUNITY
Start: 2023-03-12

## 2023-05-29 RX ORDER — FLUTICASONE PROPIONATE 50 MCG
2 SPRAY, SUSPENSION (ML) NASAL DAILY
COMMUNITY
Start: 2023-02-17

## 2023-05-29 RX ORDER — LANCETS 30 GAUGE
EACH MISCELLANEOUS
COMMUNITY
Start: 2017-02-22

## 2023-05-29 RX ORDER — PROCHLORPERAZINE MALEATE 10 MG
1 TABLET ORAL EVERY 6 HOURS PRN
COMMUNITY
Start: 2022-07-05

## 2023-05-29 RX ORDER — ASPIRIN 81 MG/81MG
CAPSULE ORAL
COMMUNITY
Start: 2022-02-01

## 2023-05-29 RX ORDER — SITAGLIPTIN AND METFORMIN HYDROCHLORIDE 500; 50 MG/1; MG/1
1 TABLET, FILM COATED ORAL 2 TIMES DAILY WITH MEALS
COMMUNITY
Start: 2023-03-12

## 2023-05-29 RX ORDER — POTASSIUM CHLORIDE 20 MEQ/1
1 TABLET, EXTENDED RELEASE ORAL DAILY
COMMUNITY
Start: 2022-11-08

## 2023-06-21 ASSESSMENT — ENCOUNTER SYMPTOMS
VOMITING: 0
ABDOMINAL PAIN: 0
SHORTNESS OF BREATH: 0
DIARRHEA: 0

## 2023-06-22 NOTE — PROGRESS NOTES
[address] [place] & [place] SURGERY       Patient Name: Karen Steinberg    : 1960    Visit Date: 2023    Office Visit Note    Subjective:         Patient is a 70 y.o. male who is being seen as a follow up with a history of diabetes with a chief complain of painful elongated toenails. Patient primary care physician is Winsome Mercedes MD. Patient states the last office visit with PCP was 23. Patient describes diabetes as being under control. Patient's last hemoglobin A1c was unknown. Patient denies any overt pedal pain. Patient denies any recent pedal trauma. Patient denies any systemic signs of infection but does state nails are thick and discolored. No other lower extremity complaints at this time.     Past Medical History:   Diagnosis Date    Cerumen impaction     CLL (chronic lymphocytic leukemia) ([facility] [address] 2014    Hematologist: Dr Arturo Weaver     Diabetes [facility])     Ear problems     Hearing reduced     Hyperlipidemia     Hypertension     Hypoxic brain damage ([facility] [address] 2014    1 yr old, developed PNA, was hypoxic, since then has had issues     Ill-defined condition      Past Surgical History:   Procedure Laterality Date    CARDIAC CATHETERIZATION  2021    significant multiple vessel disease, no intervention, referred for CABG    COL[facility]COPY  2021    [facility]    COLONOSCOPY      no record available    CORONARY ARTERY BYPASS GRAFT  2021    LIMA to LAD, SVG to OM1, SVG to PDA    IR PORT PLACEMENT EQUAL OR GREATER THAN 5 YEARS  2022    IR PORT PLACEMENT EQUAL OR GREATER THAN 5 YEARS 2022 [facility] RAD ANGIO IR    IR PORT PLACEMENT EQUAL OR GREATER THAN 5 YEARS  2022    OTHER SURGICAL HISTORY  3/7/16    CT guided BM bx       Family History   Adopted: Yes   Problem Relation Age of Onset    Lung Cancer Father         lung cancer - small oat cell    High Cholesterol Brother     Hypertension Brother     Diabetes Brother

## 2023-06-27 ENCOUNTER — OFFICE VISIT (OUTPATIENT)
Age: 63
End: 2023-06-27

## 2023-06-27 VITALS
SYSTOLIC BLOOD PRESSURE: 114 MMHG | WEIGHT: 167 LBS | BODY MASS INDEX: 26.21 KG/M2 | HEART RATE: 72 BPM | HEIGHT: 67 IN | DIASTOLIC BLOOD PRESSURE: 63 MMHG | TEMPERATURE: 97.1 F

## 2023-06-27 DIAGNOSIS — E11.42 TYPE 2 DIABETES MELLITUS WITH DIABETIC POLYNEUROPATHY, WITHOUT LONG-TERM CURRENT USE OF INSULIN (HCC): ICD-10-CM

## 2023-06-27 DIAGNOSIS — B35.1 TINEA UNGUIUM: Primary | ICD-10-CM

## 2023-06-29 ASSESSMENT — ENCOUNTER SYMPTOMS
ABDOMINAL PAIN: 0
DIARRHEA: 0
SHORTNESS OF BREATH: 0
VOMITING: 0

## 2023-08-15 SDOH — ECONOMIC STABILITY: INCOME INSECURITY: HOW HARD IS IT FOR YOU TO PAY FOR THE VERY BASICS LIKE FOOD, HOUSING, MEDICAL CARE, AND HEATING?: NOT VERY HARD

## 2023-08-15 SDOH — ECONOMIC STABILITY: FOOD INSECURITY: WITHIN THE PAST 12 MONTHS, THE FOOD YOU BOUGHT JUST DIDN'T LAST AND YOU DIDN'T HAVE MONEY TO GET MORE.: NEVER TRUE

## 2023-08-15 SDOH — ECONOMIC STABILITY: HOUSING INSECURITY
IN THE LAST 12 MONTHS, WAS THERE A TIME WHEN YOU DID NOT HAVE A STEADY PLACE TO SLEEP OR SLEPT IN A SHELTER (INCLUDING NOW)?: NO

## 2023-08-15 SDOH — ECONOMIC STABILITY: TRANSPORTATION INSECURITY
IN THE PAST 12 MONTHS, HAS LACK OF TRANSPORTATION KEPT YOU FROM MEETINGS, WORK, OR FROM GETTING THINGS NEEDED FOR DAILY LIVING?: NO

## 2023-08-15 SDOH — ECONOMIC STABILITY: FOOD INSECURITY: WITHIN THE PAST 12 MONTHS, YOU WORRIED THAT YOUR FOOD WOULD RUN OUT BEFORE YOU GOT MONEY TO BUY MORE.: NEVER TRUE

## 2023-08-18 ENCOUNTER — OFFICE VISIT (OUTPATIENT)
Age: 63
End: 2023-08-18
Payer: MEDICARE

## 2023-08-18 VITALS
RESPIRATION RATE: 16 BRPM | OXYGEN SATURATION: 98 % | HEIGHT: 67 IN | DIASTOLIC BLOOD PRESSURE: 67 MMHG | WEIGHT: 167 LBS | BODY MASS INDEX: 26.21 KG/M2 | HEART RATE: 81 BPM | TEMPERATURE: 97.2 F | SYSTOLIC BLOOD PRESSURE: 104 MMHG

## 2023-08-18 DIAGNOSIS — R80.9 DIABETES MELLITUS WITH MICROALBUMINURIA (HCC): ICD-10-CM

## 2023-08-18 DIAGNOSIS — R60.0 BILATERAL LOWER EXTREMITY EDEMA: ICD-10-CM

## 2023-08-18 DIAGNOSIS — E11.29 DIABETES MELLITUS WITH MICROALBUMINURIA (HCC): Primary | ICD-10-CM

## 2023-08-18 DIAGNOSIS — E11.29 DIABETES MELLITUS WITH MICROALBUMINURIA (HCC): ICD-10-CM

## 2023-08-18 DIAGNOSIS — I25.10 CORONARY ARTERY DISEASE INVOLVING NATIVE CORONARY ARTERY OF NATIVE HEART WITHOUT ANGINA PECTORIS: ICD-10-CM

## 2023-08-18 DIAGNOSIS — E78.00 PURE HYPERCHOLESTEROLEMIA: ICD-10-CM

## 2023-08-18 DIAGNOSIS — I10 HYPERTENSION, ESSENTIAL: ICD-10-CM

## 2023-08-18 DIAGNOSIS — L24.9 IRRITANT CONTACT DERMATITIS, UNSPECIFIED TRIGGER: ICD-10-CM

## 2023-08-18 DIAGNOSIS — R80.9 DIABETES MELLITUS WITH MICROALBUMINURIA (HCC): Primary | ICD-10-CM

## 2023-08-18 PROCEDURE — G8427 DOCREV CUR MEDS BY ELIG CLIN: HCPCS | Performed by: INTERNAL MEDICINE

## 2023-08-18 PROCEDURE — 3017F COLORECTAL CA SCREEN DOC REV: CPT | Performed by: INTERNAL MEDICINE

## 2023-08-18 PROCEDURE — G8419 CALC BMI OUT NRM PARAM NOF/U: HCPCS | Performed by: INTERNAL MEDICINE

## 2023-08-18 PROCEDURE — 99214 OFFICE O/P EST MOD 30 MIN: CPT | Performed by: INTERNAL MEDICINE

## 2023-08-18 PROCEDURE — 2022F DILAT RTA XM EVC RTNOPTHY: CPT | Performed by: INTERNAL MEDICINE

## 2023-08-18 PROCEDURE — 3078F DIAST BP <80 MM HG: CPT | Performed by: INTERNAL MEDICINE

## 2023-08-18 PROCEDURE — 3074F SYST BP LT 130 MM HG: CPT | Performed by: INTERNAL MEDICINE

## 2023-08-18 PROCEDURE — 3044F HG A1C LEVEL LT 7.0%: CPT | Performed by: INTERNAL MEDICINE

## 2023-08-18 PROCEDURE — 1123F ACP DISCUSS/DSCN MKR DOCD: CPT | Performed by: INTERNAL MEDICINE

## 2023-08-18 PROCEDURE — 1036F TOBACCO NON-USER: CPT | Performed by: INTERNAL MEDICINE

## 2023-08-18 RX ORDER — TRIAMCINOLONE ACETONIDE 1 MG/G
CREAM TOPICAL
Qty: 30 G | Refills: 0 | Status: SHIPPED | OUTPATIENT
Start: 2023-08-18

## 2023-08-18 SDOH — ECONOMIC STABILITY: FOOD INSECURITY: WITHIN THE PAST 12 MONTHS, THE FOOD YOU BOUGHT JUST DIDN'T LAST AND YOU DIDN'T HAVE MONEY TO GET MORE.: NEVER TRUE

## 2023-08-18 SDOH — ECONOMIC STABILITY: FOOD INSECURITY: WITHIN THE PAST 12 MONTHS, YOU WORRIED THAT YOUR FOOD WOULD RUN OUT BEFORE YOU GOT MONEY TO BUY MORE.: NEVER TRUE

## 2023-08-18 SDOH — ECONOMIC STABILITY: INCOME INSECURITY: HOW HARD IS IT FOR YOU TO PAY FOR THE VERY BASICS LIKE FOOD, HOUSING, MEDICAL CARE, AND HEATING?: NOT HARD AT ALL

## 2023-08-18 ASSESSMENT — PATIENT HEALTH QUESTIONNAIRE - PHQ9
SUM OF ALL RESPONSES TO PHQ QUESTIONS 1-9: 0
2. FEELING DOWN, DEPRESSED OR HOPELESS: 0
SUM OF ALL RESPONSES TO PHQ QUESTIONS 1-9: 0
SUM OF ALL RESPONSES TO PHQ9 QUESTIONS 1 & 2: 0
SUM OF ALL RESPONSES TO PHQ QUESTIONS 1-9: 0
1. LITTLE INTEREST OR PLEASURE IN DOING THINGS: 0
SUM OF ALL RESPONSES TO PHQ QUESTIONS 1-9: 0

## 2023-08-18 NOTE — ASSESSMENT & PLAN NOTE
well controlled, continue current treatment pending review of labs, did review w/ niece about stopping glipizide if DM control is stable or improved.

## 2023-08-18 NOTE — ASSESSMENT & PLAN NOTE
Stable, reviewed treatment options, continue w/ current plan. He is not elevating his legs. He can't do compression socks.

## 2023-08-18 NOTE — PROGRESS NOTES
Magdalena Greenberg is a 70 y.o. male who was seen in clinic today (8/18/2023). Assessment & Plan:   Below is the assessment and plan developed based on review of pertinent history, physical exam, labs, studies, and medications. 1. Diabetes mellitus with microalbuminuria (HCC)  Assessment & Plan:  well controlled, continue current treatment pending review of labs, did review w/ niece about stopping glipizide if DM control is stable or improved. Orders:  -     Comprehensive Metabolic Panel; Future  -     CBC; Future  -     Hemoglobin A1C; Future  -     Microalbumin / Creatinine Urine Ratio; Future  2. Coronary artery disease involving native coronary artery of native heart without angina pectoris  Assessment & Plan:  well controlled, asymptomatic. BP is well controlled, lipids are well controlled. Continue: current plan pending review of labs. Orders:  -     Comprehensive Metabolic Panel; Future  -     CBC; Future  3. Hypertension, essential  Assessment & Plan:  well controlled, continue current treatment pending review of labs    Orders:  -     Comprehensive Metabolic Panel; Future  4. Pure hypercholesterolemia  Assessment & Plan:  at goal, continue current treatment pending review of labs    Orders:  -     Comprehensive Metabolic Panel; Future  5. Bilateral lower extremity edema  Assessment & Plan:  Stable, reviewed treatment options, continue w/ current plan. He is not elevating his legs. He can't do compression socks. Orders:  -     Comprehensive Metabolic Panel; Future  6. Irritant contact dermatitis, unspecified trigger  -     triamcinolone (KENALOG) 0.1 % cream; Apply topically to rash 2 times daily. , Disp-30 g, R-0, Normal     Return in about 6 months (around 2/18/2024) for FULL PHYSICAL. Subjective/Objective:   Lamberto Rey was seen today for Diabetes and Coronary Artery Disease     Since last visit: weight is stable. Foot ulcers have helped. He is seeing podiatry.      Endocrine Review  He is seen

## 2023-08-21 ENCOUNTER — OFFICE VISIT (OUTPATIENT)
Age: 63
End: 2023-08-21
Payer: MEDICARE

## 2023-08-21 VITALS
WEIGHT: 167 LBS | HEART RATE: 76 BPM | DIASTOLIC BLOOD PRESSURE: 66 MMHG | HEIGHT: 67 IN | SYSTOLIC BLOOD PRESSURE: 119 MMHG | RESPIRATION RATE: 16 BRPM | BODY MASS INDEX: 26.21 KG/M2

## 2023-08-21 DIAGNOSIS — B35.1 TINEA UNGUIUM: Primary | ICD-10-CM

## 2023-08-21 DIAGNOSIS — E11.42 DIABETIC SENSORIMOTOR NEUROPATHY (HCC): ICD-10-CM

## 2023-08-21 PROCEDURE — 99213 OFFICE O/P EST LOW 20 MIN: CPT | Performed by: PODIATRIST

## 2023-08-21 PROCEDURE — 1036F TOBACCO NON-USER: CPT | Performed by: PODIATRIST

## 2023-08-21 PROCEDURE — 3044F HG A1C LEVEL LT 7.0%: CPT | Performed by: PODIATRIST

## 2023-08-21 PROCEDURE — G8419 CALC BMI OUT NRM PARAM NOF/U: HCPCS | Performed by: PODIATRIST

## 2023-08-21 PROCEDURE — 2022F DILAT RTA XM EVC RTNOPTHY: CPT | Performed by: PODIATRIST

## 2023-08-21 PROCEDURE — 3078F DIAST BP <80 MM HG: CPT | Performed by: PODIATRIST

## 2023-08-21 PROCEDURE — G8427 DOCREV CUR MEDS BY ELIG CLIN: HCPCS | Performed by: PODIATRIST

## 2023-08-21 PROCEDURE — 11721 DEBRIDE NAIL 6 OR MORE: CPT | Performed by: PODIATRIST

## 2023-08-21 PROCEDURE — 3017F COLORECTAL CA SCREEN DOC REV: CPT | Performed by: PODIATRIST

## 2023-08-21 PROCEDURE — 1123F ACP DISCUSS/DSCN MKR DOCD: CPT | Performed by: PODIATRIST

## 2023-08-21 PROCEDURE — 3074F SYST BP LT 130 MM HG: CPT | Performed by: PODIATRIST

## 2023-08-22 LAB
ALBUMIN SERPL-MCNC: 4.8 G/DL (ref 3.9–4.9)
ALBUMIN/CREAT UR: 12 MG/G CREAT (ref 0–29)
ALBUMIN/GLOB SERPL: 2.2 {RATIO} (ref 1.2–2.2)
ALP SERPL-CCNC: 178 IU/L (ref 44–121)
ALT SERPL-CCNC: 66 IU/L (ref 0–44)
AST SERPL-CCNC: 43 IU/L (ref 0–40)
BILIRUB SERPL-MCNC: 0.7 MG/DL (ref 0–1.2)
BUN SERPL-MCNC: 18 MG/DL (ref 8–27)
BUN/CREAT SERPL: 24 (ref 10–24)
CALCIUM SERPL-MCNC: 9.1 MG/DL (ref 8.6–10.2)
CHLORIDE SERPL-SCNC: 101 MMOL/L (ref 96–106)
CO2 SERPL-SCNC: 23 MMOL/L (ref 20–29)
CREAT SERPL-MCNC: 0.76 MG/DL (ref 0.76–1.27)
CREAT UR-MCNC: 137.2 MG/DL
EGFRCR SERPLBLD CKD-EPI 2021: 100 ML/MIN/1.73
ERYTHROCYTE [DISTWIDTH] IN BLOOD BY AUTOMATED COUNT: 16.5 % (ref 11.6–15.4)
GLOBULIN SER CALC-MCNC: 2.2 G/DL (ref 1.5–4.5)
GLUCOSE SERPL-MCNC: 109 MG/DL (ref 70–99)
HBA1C MFR BLD: 6.8 % (ref 4.8–5.6)
HCT VFR BLD AUTO: 39.5 % (ref 37.5–51)
HGB BLD-MCNC: 12.3 G/DL (ref 13–17.7)
MCH RBC QN AUTO: 25.8 PG (ref 26.6–33)
MCHC RBC AUTO-ENTMCNC: 31.1 G/DL (ref 31.5–35.7)
MCV RBC AUTO: 83 FL (ref 79–97)
MICROALBUMIN UR-MCNC: 17.1 UG/ML
MORPHOLOGY BLD-IMP: NORMAL
PLATELET # BLD AUTO: 96 X10E3/UL (ref 150–450)
POTASSIUM SERPL-SCNC: 4.3 MMOL/L (ref 3.5–5.2)
PROT SERPL-MCNC: 7 G/DL (ref 6–8.5)
RBC # BLD AUTO: 4.76 X10E6/UL (ref 4.14–5.8)
SODIUM SERPL-SCNC: 142 MMOL/L (ref 134–144)
WBC # BLD AUTO: 4.5 X10E3/UL (ref 3.4–10.8)

## 2023-08-22 ASSESSMENT — ENCOUNTER SYMPTOMS
SHORTNESS OF BREATH: 0
DIARRHEA: 0
ABDOMINAL PAIN: 0
VOMITING: 0

## 2023-08-22 NOTE — PROGRESS NOTES
[facility] PODIATRY & FOOT SURGERY       Patient Name: Mimi Ragsdale    : 1960    Visit Date: 2023    Office Visit Note    Subjective:         Patient is a 70 y.o. male who is being seen as a follow up with a history of diabetes with a chief complain of painful elongated toenails. Patient primary care physician is Michelle Ireland MD. Patient states the last office visit with PCP was 23 . Patient describes diabetes as being under control. Patient's last hemoglobin A1c was 6.8. Patient denies any overt pedal pain. Patient denies any recent pedal trauma. Patient denies any systemic signs of infection but does state nails are thick and discolored. No other lower extremity complaints at this time.     Past Medical History:   Diagnosis Date    Cerumen impaction     CLL (chronic lymphocytic leukemia) ([facility]) 2014    Hematologist: Dr Diane Shelley     Diabetes [facility])     Ear problems     Hearing reduced     Hyperlipidemia     Hypertension     Hypoxic brain damage ([facility]) 2014    1 yr old, developed PNA, was hypoxic, since then has had issues     Ill-defined condition      Past Surgical History:   Procedure Laterality Date    CARDIAC CATHETERIZATION  2021    significant multiple vessel disease, no intervention, referred for CABG    COLONOSCOPY  2021    inadequate - poor prep - [place]    COLONOSCOPY      no record available    CORONARY ARTERY BYPASS GRAFT  2021    LIMA to LAD, SVG to OM1, SVG to PDA    IR PORT PLACEMENT EQUAL OR GREATER THAN 5 YEARS  2022    IR PORT PLACEMENT EQUAL OR GREATER THAN 5 YEARS 2022 [facility] RAD [facility] IR    OTHER SURGICAL HISTORY  2016    CT guided BM bx       Family History   Adopted: Yes   Problem Relation Age of Onset    Lung Cancer Father         lung cancer - small oat cell    High Cholesterol Brother     Hypertension Brother     Diabetes Brother     No Known Problems Sister     COPD Mother       Social

## 2023-09-17 DIAGNOSIS — E78.00 PURE HYPERCHOLESTEROLEMIA, UNSPECIFIED: ICD-10-CM

## 2023-09-17 RX ORDER — ATORVASTATIN CALCIUM 80 MG/1
80 TABLET, FILM COATED ORAL DAILY
Qty: 90 TABLET | Refills: 1 | Status: SHIPPED | OUTPATIENT
Start: 2023-09-17

## 2023-10-08 DIAGNOSIS — E11.29 TYPE 2 DIABETES MELLITUS WITH OTHER DIABETIC KIDNEY COMPLICATION (HCC): ICD-10-CM

## 2023-10-08 RX ORDER — SITAGLIPTIN AND METFORMIN HYDROCHLORIDE 500; 50 MG/1; MG/1
1 TABLET, FILM COATED ORAL 2 TIMES DAILY WITH MEALS
Qty: 180 TABLET | Refills: 1 | Status: SHIPPED | OUTPATIENT
Start: 2023-10-08

## 2023-10-23 ENCOUNTER — OFFICE VISIT (OUTPATIENT)
Age: 63
End: 2023-10-23
Payer: MEDICARE

## 2023-10-23 VITALS
DIASTOLIC BLOOD PRESSURE: 86 MMHG | HEART RATE: 110 BPM | TEMPERATURE: 96.8 F | OXYGEN SATURATION: 95 % | SYSTOLIC BLOOD PRESSURE: 128 MMHG

## 2023-10-23 DIAGNOSIS — B35.1 ONYCHOMYCOSIS: Primary | ICD-10-CM

## 2023-10-23 DIAGNOSIS — E11.42 DIABETIC SENSORIMOTOR NEUROPATHY (HCC): ICD-10-CM

## 2023-10-23 PROCEDURE — 2022F DILAT RTA XM EVC RTNOPTHY: CPT | Performed by: PODIATRIST

## 2023-10-23 PROCEDURE — 11721 DEBRIDE NAIL 6 OR MORE: CPT | Performed by: PODIATRIST

## 2023-10-23 PROCEDURE — 99213 OFFICE O/P EST LOW 20 MIN: CPT | Performed by: PODIATRIST

## 2023-10-23 PROCEDURE — 3074F SYST BP LT 130 MM HG: CPT | Performed by: PODIATRIST

## 2023-10-23 PROCEDURE — G8484 FLU IMMUNIZE NO ADMIN: HCPCS | Performed by: PODIATRIST

## 2023-10-23 PROCEDURE — 3079F DIAST BP 80-89 MM HG: CPT | Performed by: PODIATRIST

## 2023-10-23 PROCEDURE — G8427 DOCREV CUR MEDS BY ELIG CLIN: HCPCS | Performed by: PODIATRIST

## 2023-10-23 PROCEDURE — 1036F TOBACCO NON-USER: CPT | Performed by: PODIATRIST

## 2023-10-23 PROCEDURE — 3044F HG A1C LEVEL LT 7.0%: CPT | Performed by: PODIATRIST

## 2023-10-23 PROCEDURE — 3017F COLORECTAL CA SCREEN DOC REV: CPT | Performed by: PODIATRIST

## 2023-10-23 PROCEDURE — 1123F ACP DISCUSS/DSCN MKR DOCD: CPT | Performed by: PODIATRIST

## 2023-10-23 PROCEDURE — G8419 CALC BMI OUT NRM PARAM NOF/U: HCPCS | Performed by: PODIATRIST

## 2023-10-23 ASSESSMENT — ENCOUNTER SYMPTOMS
VOMITING: 0
DIARRHEA: 0
ABDOMINAL PAIN: 0
SHORTNESS OF BREATH: 0

## 2023-10-23 NOTE — PROGRESS NOTES
[address] & FOOT SURGERY       Patient Name: Aleksandra Rose    : 1960    Visit Date: 10/23/2023    Office Visit Note    Subjective:         Patient is a 70 y.o. male who is being seen as a follow up with a history of diabetes with a chief complain of painful elongated toenails. Patient primary care physician is Abdiaziz Dozier MD. Patient states the last office visit with PCP was 23. Patient describes diabetes as being under control. Patient's last hemoglobin A1c was 6.8. Patient denies any overt pedal pain. Patient denies any recent pedal trauma. Patient denies any systemic signs of infection but does state nails are thick and discolored. No other lower extremity complaints at this time.     Past Medical History:   Diagnosis Date    Cerumen impaction     CLL (chronic lymphocytic leukemia) ([address] [facility]) 2014    Hematologist: Dr Star Kay     Diabetes [facility])     Ear problems     Hearing reduced     Hyperlipidemia     Hypertension     Hypoxic brain damage ([address] [facility]) 2014    1 yr old, developed PNA, was hypoxic, since then has had issues     Ill-defined condition      Past Surgical History:   Procedure Laterality Date    CARDIAC CATHETERIZATION  2021    significant multiple vessel disease, no intervention, referred for CABG    COLONOSCOPY  2021    inadequate - poor prep - [place]    COLONOSCOPY      no record available    CORONARY ARTERY BYPASS GRAFT  2021    LIMA to LAD, SVG to OM1, SVG to PDA    IR PORT PLACEMENT EQUAL OR GREATER THAN 5 YEARS  2022    IR PORT PLACEMENT EQUAL OR GREATER THAN 5 YEARS 2022 [facility] RAD [facility]IO IR    OTHER SURGICAL HISTORY  2016    CT guided BM bx       Family History   Adopted: Yes   Problem Relation Age of Onset    Lung Cancer Father         lung cancer - small oat cell    High Cholesterol Brother     Hypertension Brother     Diabetes Brother     No Known Problems Sister     COPD Mother

## 2023-10-27 DIAGNOSIS — R60.0 BILATERAL LOWER EXTREMITY EDEMA: Primary | ICD-10-CM

## 2023-10-27 RX ORDER — FUROSEMIDE 40 MG/1
TABLET ORAL
Qty: 135 TABLET | Refills: 1 | Status: SHIPPED | OUTPATIENT
Start: 2023-10-27

## 2024-01-21 DIAGNOSIS — E78.00 PURE HYPERCHOLESTEROLEMIA, UNSPECIFIED: ICD-10-CM

## 2024-01-21 DIAGNOSIS — E11.29 TYPE 2 DIABETES MELLITUS WITH OTHER DIABETIC KIDNEY COMPLICATION (HCC): ICD-10-CM

## 2024-01-21 RX ORDER — ATORVASTATIN CALCIUM 80 MG/1
80 TABLET, FILM COATED ORAL DAILY
Qty: 90 TABLET | Refills: 1 | Status: SHIPPED | OUTPATIENT
Start: 2024-01-21

## 2024-01-21 RX ORDER — POTASSIUM CHLORIDE 1500 MG/1
TABLET, EXTENDED RELEASE ORAL
Qty: 90 TABLET | Refills: 1 | Status: SHIPPED | OUTPATIENT
Start: 2024-01-21

## 2024-01-24 ENCOUNTER — OFFICE VISIT (OUTPATIENT)
Age: 64
End: 2024-01-24
Payer: MEDICARE

## 2024-01-24 VITALS
WEIGHT: 167 LBS | DIASTOLIC BLOOD PRESSURE: 71 MMHG | HEART RATE: 104 BPM | SYSTOLIC BLOOD PRESSURE: 118 MMHG | BODY MASS INDEX: 26.21 KG/M2 | TEMPERATURE: 97.8 F | HEIGHT: 67 IN

## 2024-01-24 DIAGNOSIS — L84 PRE-ULCERATIVE CALLUSES: ICD-10-CM

## 2024-01-24 DIAGNOSIS — B35.1 ONYCHOMYCOSIS: ICD-10-CM

## 2024-01-24 DIAGNOSIS — M79.672 PAIN IN LEFT FOOT: Primary | ICD-10-CM

## 2024-01-24 DIAGNOSIS — E11.42 DIABETIC SENSORIMOTOR NEUROPATHY (HCC): ICD-10-CM

## 2024-01-24 PROCEDURE — 1036F TOBACCO NON-USER: CPT | Performed by: PODIATRIST

## 2024-01-24 PROCEDURE — 3046F HEMOGLOBIN A1C LEVEL >9.0%: CPT | Performed by: PODIATRIST

## 2024-01-24 PROCEDURE — 11056 PARNG/CUTG B9 HYPRKR LES 2-4: CPT | Performed by: PODIATRIST

## 2024-01-24 PROCEDURE — G8484 FLU IMMUNIZE NO ADMIN: HCPCS | Performed by: PODIATRIST

## 2024-01-24 PROCEDURE — G8419 CALC BMI OUT NRM PARAM NOF/U: HCPCS | Performed by: PODIATRIST

## 2024-01-24 PROCEDURE — G8427 DOCREV CUR MEDS BY ELIG CLIN: HCPCS | Performed by: PODIATRIST

## 2024-01-24 PROCEDURE — 2022F DILAT RTA XM EVC RTNOPTHY: CPT | Performed by: PODIATRIST

## 2024-01-24 PROCEDURE — 1123F ACP DISCUSS/DSCN MKR DOCD: CPT | Performed by: PODIATRIST

## 2024-01-24 PROCEDURE — 99214 OFFICE O/P EST MOD 30 MIN: CPT | Performed by: PODIATRIST

## 2024-01-24 PROCEDURE — 3074F SYST BP LT 130 MM HG: CPT | Performed by: PODIATRIST

## 2024-01-24 PROCEDURE — 11721 DEBRIDE NAIL 6 OR MORE: CPT | Performed by: PODIATRIST

## 2024-01-24 PROCEDURE — 3017F COLORECTAL CA SCREEN DOC REV: CPT | Performed by: PODIATRIST

## 2024-01-24 PROCEDURE — 3078F DIAST BP <80 MM HG: CPT | Performed by: PODIATRIST

## 2024-01-24 RX ORDER — AMMONIUM LACTATE 12 G/100G
CREAM TOPICAL
Qty: 385 G | Refills: 4 | Status: SHIPPED | OUTPATIENT
Start: 2024-01-24

## 2024-01-24 ASSESSMENT — ENCOUNTER SYMPTOMS
ABDOMINAL PAIN: 0
DIARRHEA: 0
SHORTNESS OF BREATH: 0
VOMITING: 0

## 2024-01-24 NOTE — PROGRESS NOTES
Chief Complaint   Patient presents with    Follow-up     3 month      /71 (Site: Left Upper Arm, Position: Sitting, Cuff Size: Large Adult)   Pulse (!) 104   Temp 97.8 °F (36.6 °C) (Temporal)   Ht 1.702 m (5' 7\")   Wt 75.8 kg (167 lb)   BMI 26.16 kg/m²     1. Have you been to the ER, urgent care clinic since your last visit?  Hospitalized since your last visit?No    2. Have you seen or consulted any other health care providers outside of the [facility] since your last visit?  Include any pap smears or colon screening. No

## 2024-01-24 NOTE — PROGRESS NOTES
[facility] PODIATRY & FOOT SURGERY       Patient Name: Edvin Leal    : 1960    Visit Date: 2024    Office Visit Note    Subjective:         Patient is a 63 y.o. male who is being seen as a follow up with a history of diabetes with a chief complain of painful elongated toenails. Patient primary care physician is Festus Novoa MD. Patient states the last office visit with PCP was 23.  Patient describes diabetes as being under control. Patient's last hemoglobin A1c was 6.8.  Patient denies any overt pedal pain.  Patient denies any recent pedal trauma.  Patient denies any systemic signs of infection but does state nails are thick and discolored.  Patient also has thick calluses plantar foot.    Past Medical History:   Diagnosis Date    Cerumen impaction     CLL (chronic lymphocytic leukemia) ([facility]) 2014    Hematologist: Dr Paz     Diabetes ([facility])     Ear problems     Hearing reduced     Hyperlipidemia     Hypertension     Hypoxic brain damage ([facility]) 2014    1 yr old, developed PNA, was hypoxic, since then has had issues     Ill-defined condition      Past Surgical History:   Procedure Laterality Date    CARDIAC CATHETERIZATION  2021    significant multiple vessel disease, no intervention, referred for CABG    COLONOSCOPY  2021    inadequate - poor prep - [place]    COLONOSCOPY      no record available    CORONARY ARTERY BYPASS GRAFT  2021    LIMA to LAD, SVG to OM1, SVG to PDA    IR PORT PLACEMENT EQUAL OR GREATER THAN 5 YEARS  2022    IR PORT PLACEMENT EQUAL OR GREATER THAN 5 YEARS 2022 [facility] RAD ANGIO IR    OTHER SURGICAL HISTORY  2016    CT guided BM bx       Family History   Adopted: Yes   Problem Relation Age of Onset    Lung Cancer Father         lung cancer - small oat cell    High Cholesterol Brother     Hypertension Brother     Diabetes Brother     No Known Problems Sister     COPD Mother       Social

## 2024-01-25 LAB
ALBUMIN SERPL-MCNC: 4.4 G/DL (ref 3.9–4.9)
ALBUMIN/GLOB SERPL: 1.8 {RATIO} (ref 1.2–2.2)
ALP SERPL-CCNC: 181 IU/L (ref 44–121)
ALT SERPL-CCNC: 73 IU/L (ref 0–44)
AST SERPL-CCNC: 41 IU/L (ref 0–40)
BILIRUB SERPL-MCNC: 0.8 MG/DL (ref 0–1.2)
BUN SERPL-MCNC: 19 MG/DL (ref 8–27)
BUN/CREAT SERPL: 24 (ref 10–24)
CALCIUM SERPL-MCNC: 9.9 MG/DL (ref 8.6–10.2)
CHLORIDE SERPL-SCNC: 98 MMOL/L (ref 96–106)
CO2 SERPL-SCNC: 23 MMOL/L (ref 20–29)
CREAT SERPL-MCNC: 0.8 MG/DL (ref 0.76–1.27)
EGFRCR SERPLBLD CKD-EPI 2021: 98 ML/MIN/1.73
ERYTHROCYTE [DISTWIDTH] IN BLOOD BY AUTOMATED COUNT: 15.9 % (ref 11.6–15.4)
GLOBULIN SER CALC-MCNC: 2.5 G/DL (ref 1.5–4.5)
GLUCOSE SERPL-MCNC: 211 MG/DL (ref 70–99)
HBA1C MFR BLD: 10.7 % (ref 4.8–5.6)
HCT VFR BLD AUTO: 42.4 % (ref 37.5–51)
HGB BLD-MCNC: 13 G/DL (ref 13–17.7)
MCH RBC QN AUTO: 26.2 PG (ref 26.6–33)
MCHC RBC AUTO-ENTMCNC: 30.7 G/DL (ref 31.5–35.7)
MCV RBC AUTO: 86 FL (ref 79–97)
PLATELET # BLD AUTO: 140 X10E3/UL (ref 150–450)
POTASSIUM SERPL-SCNC: 4.9 MMOL/L (ref 3.5–5.2)
PROT SERPL-MCNC: 6.9 G/DL (ref 6–8.5)
RBC # BLD AUTO: 4.96 X10E6/UL (ref 4.14–5.8)
SODIUM SERPL-SCNC: 139 MMOL/L (ref 134–144)
WBC # BLD AUTO: 9.1 X10E3/UL (ref 3.4–10.8)

## 2024-01-25 RX ORDER — GLIPIZIDE 5 MG/1
5 TABLET ORAL DAILY
Qty: 90 TABLET | Refills: 0 | Status: SHIPPED | OUTPATIENT
Start: 2024-01-25

## 2024-01-29 NOTE — TELEPHONE ENCOUNTER
Lov:01/24/24  Nov:04/17/24    Medication requested:Ciclopirox Olamine     Please send to pharmacy if appropriate.

## 2024-04-17 ENCOUNTER — OFFICE VISIT (OUTPATIENT)
Age: 64
End: 2024-04-17
Payer: MEDICARE

## 2024-04-17 VITALS
SYSTOLIC BLOOD PRESSURE: 100 MMHG | HEIGHT: 67 IN | BODY MASS INDEX: 26.27 KG/M2 | OXYGEN SATURATION: 97 % | WEIGHT: 167.4 LBS | TEMPERATURE: 97.5 F | DIASTOLIC BLOOD PRESSURE: 65 MMHG | RESPIRATION RATE: 16 BRPM | HEART RATE: 70 BPM

## 2024-04-17 VITALS
TEMPERATURE: 97.7 F | SYSTOLIC BLOOD PRESSURE: 124 MMHG | HEIGHT: 67 IN | HEART RATE: 92 BPM | DIASTOLIC BLOOD PRESSURE: 72 MMHG | OXYGEN SATURATION: 98 % | WEIGHT: 167 LBS | BODY MASS INDEX: 26.21 KG/M2

## 2024-04-17 DIAGNOSIS — E78.00 PURE HYPERCHOLESTEROLEMIA: ICD-10-CM

## 2024-04-17 DIAGNOSIS — C91.10 CLL (CHRONIC LYMPHOCYTIC LEUKEMIA) (HCC): ICD-10-CM

## 2024-04-17 DIAGNOSIS — R80.9 DIABETES MELLITUS WITH MICROALBUMINURIA (HCC): Primary | ICD-10-CM

## 2024-04-17 DIAGNOSIS — R60.0 BILATERAL LOWER EXTREMITY EDEMA: ICD-10-CM

## 2024-04-17 DIAGNOSIS — E11.42 DIABETIC SENSORIMOTOR NEUROPATHY (HCC): ICD-10-CM

## 2024-04-17 DIAGNOSIS — I25.10 CORONARY ARTERY DISEASE INVOLVING NATIVE CORONARY ARTERY OF NATIVE HEART WITHOUT ANGINA PECTORIS: ICD-10-CM

## 2024-04-17 DIAGNOSIS — H61.21 EXCESSIVE CERUMEN IN RIGHT EAR CANAL: ICD-10-CM

## 2024-04-17 DIAGNOSIS — E11.29 DIABETES MELLITUS WITH MICROALBUMINURIA (HCC): Primary | ICD-10-CM

## 2024-04-17 DIAGNOSIS — E11.29 DIABETES MELLITUS WITH MICROALBUMINURIA (HCC): ICD-10-CM

## 2024-04-17 DIAGNOSIS — I10 HYPERTENSION, ESSENTIAL: ICD-10-CM

## 2024-04-17 DIAGNOSIS — R80.9 DIABETES MELLITUS WITH MICROALBUMINURIA (HCC): ICD-10-CM

## 2024-04-17 DIAGNOSIS — B35.1 ONYCHOMYCOSIS: Primary | ICD-10-CM

## 2024-04-17 PROCEDURE — 3017F COLORECTAL CA SCREEN DOC REV: CPT | Performed by: PODIATRIST

## 2024-04-17 PROCEDURE — 99214 OFFICE O/P EST MOD 30 MIN: CPT | Performed by: INTERNAL MEDICINE

## 2024-04-17 PROCEDURE — 99213 OFFICE O/P EST LOW 20 MIN: CPT | Performed by: PODIATRIST

## 2024-04-17 PROCEDURE — 1036F TOBACCO NON-USER: CPT | Performed by: PODIATRIST

## 2024-04-17 PROCEDURE — 11721 DEBRIDE NAIL 6 OR MORE: CPT | Performed by: PODIATRIST

## 2024-04-17 PROCEDURE — G8427 DOCREV CUR MEDS BY ELIG CLIN: HCPCS | Performed by: PODIATRIST

## 2024-04-17 PROCEDURE — G8419 CALC BMI OUT NRM PARAM NOF/U: HCPCS | Performed by: PODIATRIST

## 2024-04-17 PROCEDURE — 3078F DIAST BP <80 MM HG: CPT | Performed by: PODIATRIST

## 2024-04-17 PROCEDURE — 2022F DILAT RTA XM EVC RTNOPTHY: CPT | Performed by: PODIATRIST

## 2024-04-17 PROCEDURE — 3074F SYST BP LT 130 MM HG: CPT | Performed by: PODIATRIST

## 2024-04-17 PROCEDURE — 3046F HEMOGLOBIN A1C LEVEL >9.0%: CPT | Performed by: INTERNAL MEDICINE

## 2024-04-17 PROCEDURE — G8427 DOCREV CUR MEDS BY ELIG CLIN: HCPCS | Performed by: INTERNAL MEDICINE

## 2024-04-17 PROCEDURE — 3074F SYST BP LT 130 MM HG: CPT | Performed by: INTERNAL MEDICINE

## 2024-04-17 PROCEDURE — 3078F DIAST BP <80 MM HG: CPT | Performed by: INTERNAL MEDICINE

## 2024-04-17 PROCEDURE — G8419 CALC BMI OUT NRM PARAM NOF/U: HCPCS | Performed by: INTERNAL MEDICINE

## 2024-04-17 PROCEDURE — PBSHW REMOVAL IMPACTED CERUMEN IRRIGATION/LVG UNILAT: Performed by: INTERNAL MEDICINE

## 2024-04-17 PROCEDURE — 1123F ACP DISCUSS/DSCN MKR DOCD: CPT | Performed by: INTERNAL MEDICINE

## 2024-04-17 PROCEDURE — 1036F TOBACCO NON-USER: CPT | Performed by: INTERNAL MEDICINE

## 2024-04-17 PROCEDURE — 3017F COLORECTAL CA SCREEN DOC REV: CPT | Performed by: INTERNAL MEDICINE

## 2024-04-17 PROCEDURE — 2022F DILAT RTA XM EVC RTNOPTHY: CPT | Performed by: INTERNAL MEDICINE

## 2024-04-17 PROCEDURE — 69209 REMOVE IMPACTED EAR WAX UNI: CPT | Performed by: INTERNAL MEDICINE

## 2024-04-17 PROCEDURE — 3052F HG A1C>EQUAL 8.0%<EQUAL 9.0%: CPT | Performed by: PODIATRIST

## 2024-04-17 PROCEDURE — 1123F ACP DISCUSS/DSCN MKR DOCD: CPT | Performed by: PODIATRIST

## 2024-04-17 ASSESSMENT — ENCOUNTER SYMPTOMS
DIARRHEA: 0
COUGH: 0
BLOOD IN STOOL: 0
SHORTNESS OF BREATH: 0
NAUSEA: 0
ABDOMINAL PAIN: 0
CONSTIPATION: 0
VOMITING: 0

## 2024-04-17 ASSESSMENT — PATIENT HEALTH QUESTIONNAIRE - PHQ9
SUM OF ALL RESPONSES TO PHQ QUESTIONS 1-9: 0
2. FEELING DOWN, DEPRESSED OR HOPELESS: NOT AT ALL
SUM OF ALL RESPONSES TO PHQ QUESTIONS 1-9: 0
SUM OF ALL RESPONSES TO PHQ9 QUESTIONS 1 & 2: 0
SUM OF ALL RESPONSES TO PHQ QUESTIONS 1-9: 0
SUM OF ALL RESPONSES TO PHQ QUESTIONS 1-9: 0
1. LITTLE INTEREST OR PLEASURE IN DOING THINGS: NOT AT ALL

## 2024-04-17 NOTE — PROGRESS NOTES
Chief Complaint   Patient presents with    Follow-up    Diabetes     /72 (Site: Left Upper Arm, Position: Sitting, Cuff Size: Medium Adult)   Pulse 92   Temp 97.7 °F (36.5 °C) (Temporal)   Ht 1.702 m (5' 7\")   Wt 75.8 kg (167 lb)   SpO2 98%   BMI 26.16 kg/m²     1. Have you been to the ER, urgent care clinic since your last visit?  Hospitalized since your last visit?No    2. Have you seen or consulted any other health care providers outside of the [facility] since your last visit?  Include any pap smears or colon screening. No

## 2024-04-17 NOTE — ASSESSMENT & PLAN NOTE
Chronic and stable, reviewed treatment options, continue w/ current plan.  We reviewed when to try and increase dose of lasix.  He is not elevating his legs.  He can't do compression socks.

## 2024-04-17 NOTE — ASSESSMENT & PLAN NOTE
Asymptomatic, saw Dr Paz today. Monitored by specialist- no acute findings meriting change in the plan

## 2024-04-17 NOTE — PROGRESS NOTES
visit: weight is stable.  He had appointment scheduled with me in February and March but had to reschedule. He had labs done in January in anticipation of these appointments.     Endocrine Review  He is seen for diabetes.  He had labs done in January in anticipation of Feb '24 appointment.  A1c was significantly worse, attributed to poor diet.  Glipizide was restarted.Testing: is not performed.  He reports medication compliance: compliant all of the time.  Medication side effects: none.  Diabetic diet compliance: noncompliant much of the time.       Cardiovascular Review  The patient has CAD, hypertension, hyperlipidemia and lower extremity edema.  He reports taking medications as instructed, no medication side effects noted, patient does not perform home BP monitoring.  He denies orthopnea, SOB/RASMUSSEN, or PND.          Prior to Admission medications    Medication Sig Start Date End Date Taking? Authorizing Provider   ciclopirox (LOPROX) 0.77 % cream APPLY TO AFFECTED AREA TWICE A DAY 1/30/24  Yes WolfeEben wuph, DPM   glipiZIDE (GLUCOTROL) 5 MG tablet Take 1 tablet by mouth daily 1/25/24  Yes Festus Novoa MD   ammonium lactate (LAC-HYDRIN) 12 % cream Apply topically to the bilateral feet 2 times a day 1/24/24  Yes Wolfe, Aftab, DPM   potassium chloride (KLOR-CON M) 20 MEQ TBCR extended release tablet TAKE 1 TABLET BY MOUTH EVERY DAY 1/21/24  Yes Festus Novoa MD   atorvastatin (LIPITOR) 80 MG tablet TAKE 1 TABLET BY MOUTH EVERY DAY 1/21/24  Yes Festus Novoa MD   furosemide (LASIX) 40 MG tablet 1 tab in the morning and 1/2 tab in the afternoon 10/27/23  Yes Festus Novoa MD   JANUMET  MG per tablet TAKE 1 TABLET BY MOUTH TWICE A DAY WITH MEALS 10/8/23  Yes Festus Novoa MD   triamcinolone (KENALOG) 0.1 % cream Apply topically to rash 2 times daily. 8/18/23  Yes Festus Novoa MD   Lancets MISC Test BS daily.DX:250.00 2/22/17  Yes Automatic Reconciliation, Ar

## 2024-04-18 LAB
ALBUMIN SERPL-MCNC: 3.7 G/DL (ref 3.5–5)
ALBUMIN/GLOB SERPL: 1.3 (ref 1.1–2.2)
ALP SERPL-CCNC: 175 U/L (ref 45–117)
ALT SERPL-CCNC: 48 U/L (ref 12–78)
ANION GAP SERPL CALC-SCNC: 6 MMOL/L (ref 5–15)
AST SERPL-CCNC: 29 U/L (ref 15–37)
BILIRUB SERPL-MCNC: 0.6 MG/DL (ref 0.2–1)
BUN SERPL-MCNC: 13 MG/DL (ref 6–20)
BUN/CREAT SERPL: 15 (ref 12–20)
CALCIUM SERPL-MCNC: 9.4 MG/DL (ref 8.5–10.1)
CHLORIDE SERPL-SCNC: 103 MMOL/L (ref 97–108)
CHOLEST SERPL-MCNC: 88 MG/DL
CO2 SERPL-SCNC: 30 MMOL/L (ref 21–32)
CREAT SERPL-MCNC: 0.89 MG/DL (ref 0.7–1.3)
EST. AVERAGE GLUCOSE BLD GHB EST-MCNC: 194 MG/DL
GLOBULIN SER CALC-MCNC: 2.9 G/DL (ref 2–4)
GLUCOSE SERPL-MCNC: 277 MG/DL (ref 65–100)
HBA1C MFR BLD: 8.4 % (ref 4–5.6)
HDLC SERPL-MCNC: 23 MG/DL
HDLC SERPL: 3.8 (ref 0–5)
LDLC SERPL CALC-MCNC: 35.8 MG/DL (ref 0–100)
POTASSIUM SERPL-SCNC: 4.3 MMOL/L (ref 3.5–5.1)
PROT SERPL-MCNC: 6.6 G/DL (ref 6.4–8.2)
SODIUM SERPL-SCNC: 139 MMOL/L (ref 136–145)
TRIGL SERPL-MCNC: 146 MG/DL
VLDLC SERPL CALC-MCNC: 29.2 MG/DL

## 2024-04-18 NOTE — RESULT ENCOUNTER NOTE
Results released to patient via [address]  All labs are stable or at goal for him, except for uncontrolled DM (A1c of 8.4% and non-fasting sugar of 277).  Medications restarted 2-3 months ago and diet changes are recent.  Will continue to push diet changes.

## 2024-04-19 ASSESSMENT — ENCOUNTER SYMPTOMS
VOMITING: 0
ABDOMINAL PAIN: 0
SHORTNESS OF BREATH: 0
DIARRHEA: 0

## 2024-04-20 NOTE — PROGRESS NOTES
[facility] PODIATRY & FOOT SURGERY       Patient Name: Edvin Leal    : 1960    Visit Date: 2024    Office Visit Note    Subjective:         Patient is a 64 y.o. male who is being seen as a follow up with a history of diabetes with a chief complain of painful elongated toenails. Patient primary care physician is Festus Novoa MD. Patient states the last office visit with PCP was 23.  Patient describes diabetes as being under control. Patient's last hemoglobin A1c was 8.4.  Patient denies any overt pedal pain.  Patient denies any recent pedal trauma.  Patient denies any systemic signs of infection but does state nails are thick and discolored.  No other lower extremity complaints at this time.    Past Medical History:   Diagnosis Date    Cerumen impaction     CLL (chronic lymphocytic leukemia) ([facility]) 2014    Hematologist: Dr Paz     Diabetes ([facility])     Ear problems     Hearing reduced     Hyperlipidemia     Hypertension     Hypoxic brain damage ([facility]) 2014    1 yr old, developed PNA, was hypoxic, since then has had issues     Ill-defined condition      Past Surgical History:   Procedure Laterality Date    CARDIAC CATHETERIZATION  2021    significant multiple vessel disease, no intervention, referred for CABG    COLONOSCOPY  2021    inadequate - poor prep - [place]    COLONOSCOPY      no record available    CORONARY ARTERY BYPASS GRAFT  2021    LIMA to LAD, SVG to OM1, SVG to PDA    IR PORT PLACEMENT EQUAL OR GREATER THAN 5 YEARS  2022    IR PORT PLACEMENT EQUAL OR GREATER THAN 5 YEARS 2022 [facility] RAD ANGIO IR    OTHER SURGICAL HISTORY  2016    CT guided BM bx       Family History   Adopted: Yes   Problem Relation Age of Onset    Lung Cancer Father         lung cancer - small oat cell    High Cholesterol Brother     Hypertension Brother     Diabetes Brother     No Known Problems Sister     COPD Mother       Social

## 2024-04-25 DIAGNOSIS — E11.29 TYPE 2 DIABETES MELLITUS WITH OTHER DIABETIC KIDNEY COMPLICATION (HCC): ICD-10-CM

## 2024-04-25 RX ORDER — GLIPIZIDE 5 MG/1
5 TABLET ORAL DAILY
Qty: 90 TABLET | Refills: 1 | Status: SHIPPED | OUTPATIENT
Start: 2024-04-25

## 2024-04-25 RX ORDER — SITAGLIPTIN AND METFORMIN HYDROCHLORIDE 500; 50 MG/1; MG/1
1 TABLET, FILM COATED ORAL 2 TIMES DAILY WITH MEALS
Qty: 180 TABLET | Refills: 1 | Status: SHIPPED | OUTPATIENT
Start: 2024-04-25

## 2024-04-25 NOTE — TELEPHONE ENCOUNTER
Chief Complaint   Patient presents with    Medication Refill     Last Appointment with Dr. Festus Novoa:  4/17/24    Future Appointments   Date Time Provider Department Center   7/24/2024  8:15 AM Aftab Wolfe DPM [facility] AMB   10/23/2024  2:00 PM Festus Novoa MD [facility] AMB     [facility]

## 2024-06-15 DIAGNOSIS — R60.0 BILATERAL LOWER EXTREMITY EDEMA: ICD-10-CM

## 2024-06-17 RX ORDER — FUROSEMIDE 40 MG/1
TABLET ORAL
Qty: 135 TABLET | Refills: 1 | Status: SHIPPED | OUTPATIENT
Start: 2024-06-17

## 2024-07-20 DIAGNOSIS — E11.29 TYPE 2 DIABETES MELLITUS WITH OTHER DIABETIC KIDNEY COMPLICATION (HCC): ICD-10-CM

## 2024-07-21 RX ORDER — POTASSIUM CHLORIDE 1500 MG/1
TABLET, EXTENDED RELEASE ORAL
Qty: 90 TABLET | Refills: 3 | Status: SHIPPED | OUTPATIENT
Start: 2024-07-21

## 2024-07-24 ENCOUNTER — OFFICE VISIT (OUTPATIENT)
Age: 64
End: 2024-07-24
Payer: MEDICARE

## 2024-07-24 VITALS
OXYGEN SATURATION: 98 % | HEART RATE: 63 BPM | RESPIRATION RATE: 16 BRPM | BODY MASS INDEX: 25.9 KG/M2 | WEIGHT: 165 LBS | HEIGHT: 67 IN | DIASTOLIC BLOOD PRESSURE: 57 MMHG | TEMPERATURE: 98.4 F | SYSTOLIC BLOOD PRESSURE: 114 MMHG

## 2024-07-24 DIAGNOSIS — E11.42 DIABETIC SENSORIMOTOR NEUROPATHY (HCC): ICD-10-CM

## 2024-07-24 DIAGNOSIS — B35.1 ONYCHOMYCOSIS: Primary | ICD-10-CM

## 2024-07-24 PROCEDURE — 11721 DEBRIDE NAIL 6 OR MORE: CPT | Performed by: PODIATRIST

## 2024-07-24 PROCEDURE — 2022F DILAT RTA XM EVC RTNOPTHY: CPT | Performed by: PODIATRIST

## 2024-07-24 PROCEDURE — 99213 OFFICE O/P EST LOW 20 MIN: CPT | Performed by: PODIATRIST

## 2024-07-24 PROCEDURE — 1036F TOBACCO NON-USER: CPT | Performed by: PODIATRIST

## 2024-07-24 PROCEDURE — 3074F SYST BP LT 130 MM HG: CPT | Performed by: PODIATRIST

## 2024-07-24 PROCEDURE — 1123F ACP DISCUSS/DSCN MKR DOCD: CPT | Performed by: PODIATRIST

## 2024-07-24 PROCEDURE — 3078F DIAST BP <80 MM HG: CPT | Performed by: PODIATRIST

## 2024-07-24 PROCEDURE — 3017F COLORECTAL CA SCREEN DOC REV: CPT | Performed by: PODIATRIST

## 2024-07-24 PROCEDURE — 3052F HG A1C>EQUAL 8.0%<EQUAL 9.0%: CPT | Performed by: PODIATRIST

## 2024-07-24 PROCEDURE — G8427 DOCREV CUR MEDS BY ELIG CLIN: HCPCS | Performed by: PODIATRIST

## 2024-07-24 PROCEDURE — G8419 CALC BMI OUT NRM PARAM NOF/U: HCPCS | Performed by: PODIATRIST

## 2024-07-24 ASSESSMENT — ENCOUNTER SYMPTOMS
ABDOMINAL PAIN: 0
SHORTNESS OF BREATH: 0
VOMITING: 0
DIARRHEA: 0

## 2024-07-24 NOTE — PROGRESS NOTES
Chief Complaint   Patient presents with    Follow-up     BP (!) 114/57   Pulse 63   Temp 98.4 °F (36.9 °C)   Resp 16   Ht 1.702 m (5' 7\")   Wt 74.8 kg (165 lb)   SpO2 98%   BMI 25.84 kg/m²   1. Have you been to the ER, urgent care clinic since your last visit?  Hospitalized since your last visit?No    2. Have you seen or consulted any other health care providers outside of the [facility] since your last visit?  Include any pap smears or colon screening. No    
  Gastrointestinal:  Negative for abdominal pain, diarrhea and vomiting.   Neurological:  Negative for weakness.   Psychiatric/Behavioral:  Negative for behavioral problems. The patient is not nervous/anxious.         Objective:     Vitals:    07/24/24 0830   BP: (!) 114/57   Pulse: 63   Resp: 16   Temp: 98.4 °F (36.9 °C)   SpO2: 98%       GEN: Pt is AAOx4 and in NAD.     DERM: No wounds noted to B/L LE. Dry skin noted to B/L LE. No proximal lymphatic streaking noted to B/L LE. Toenails 1-5 B/L noted to be elongated, dystrophic and with subungual debris. Skin noted to be thin and atrophic to the bilateral lower extremity.    VASC: Pedal pulses (DP/PT) diminished to B/L LE. CFT<3sec to all digits of B/L LE. No hair growth noted to the B/L LE. Skin temp is warm to cool from proximal to distal for B/L LE. Neg homas/yunior signs to B/L LE. No varicosities or telangectasias noted to B/L LE.    NEURO: Protective and epicritic sensations grossly absent to B/L LE. Paraesthesia noted to the bilateral feet.    MSK: Negative pain on palpation, no gross deformities noted. Good muscle tone and bulk noted to B/L LE.      Assessment:      Diagnosis Orders   1. Onychomycosis        2. Diabetic sensorimotor neuropathy (HCC)             Plan:     Patient seen and evaluated in the office.  Discussed and educated patient regarding his current medical condition as it pertains to his diabetes and his thick/discolored toenails.  Instructed patient to monitor his feet daily for possible wound formation, be compliant with all medications and wear supportive shoe gear for wound prevention. Reviewed and discussed most recent lab work, specifically as it pertains to his diabetes. Patient verbalized understanding of all discussed and reviewed.  A sharp toenail plate debridement was performed to all 10 pedal digits with a nail nipper without incident. Patient to file down nail and apply antifungal medication as prescribed.        Return in about

## 2024-08-14 RX ORDER — GLIPIZIDE 5 MG/1
5 TABLET ORAL DAILY
Qty: 90 TABLET | Refills: 1 | OUTPATIENT
Start: 2024-08-14

## 2024-10-21 SDOH — ECONOMIC STABILITY: FOOD INSECURITY: WITHIN THE PAST 12 MONTHS, YOU WORRIED THAT YOUR FOOD WOULD RUN OUT BEFORE YOU GOT MONEY TO BUY MORE.: NEVER TRUE

## 2024-10-21 SDOH — HEALTH STABILITY: PHYSICAL HEALTH: ON AVERAGE, HOW MANY MINUTES DO YOU ENGAGE IN EXERCISE AT THIS LEVEL?: 30 MIN

## 2024-10-21 SDOH — ECONOMIC STABILITY: FOOD INSECURITY: WITHIN THE PAST 12 MONTHS, THE FOOD YOU BOUGHT JUST DIDN'T LAST AND YOU DIDN'T HAVE MONEY TO GET MORE.: NEVER TRUE

## 2024-10-21 SDOH — HEALTH STABILITY: PHYSICAL HEALTH: ON AVERAGE, HOW MANY DAYS PER WEEK DO YOU ENGAGE IN MODERATE TO STRENUOUS EXERCISE (LIKE A BRISK WALK)?: 5 DAYS

## 2024-10-21 SDOH — ECONOMIC STABILITY: INCOME INSECURITY: HOW HARD IS IT FOR YOU TO PAY FOR THE VERY BASICS LIKE FOOD, HOUSING, MEDICAL CARE, AND HEATING?: NOT HARD AT ALL

## 2024-10-21 ASSESSMENT — PATIENT HEALTH QUESTIONNAIRE - PHQ9
SUM OF ALL RESPONSES TO PHQ QUESTIONS 1-9: 0
1. LITTLE INTEREST OR PLEASURE IN DOING THINGS: NOT AT ALL
SUM OF ALL RESPONSES TO PHQ9 QUESTIONS 1 & 2: 0
2. FEELING DOWN, DEPRESSED OR HOPELESS: NOT AT ALL
SUM OF ALL RESPONSES TO PHQ QUESTIONS 1-9: 0

## 2024-10-21 ASSESSMENT — LIFESTYLE VARIABLES
HOW OFTEN DO YOU HAVE A DRINK CONTAINING ALCOHOL: 1
HOW OFTEN DO YOU HAVE A DRINK CONTAINING ALCOHOL: NEVER
HOW OFTEN DO YOU HAVE SIX OR MORE DRINKS ON ONE OCCASION: 1
HOW MANY STANDARD DRINKS CONTAINING ALCOHOL DO YOU HAVE ON A TYPICAL DAY: PATIENT DOES NOT DRINK
HOW MANY STANDARD DRINKS CONTAINING ALCOHOL DO YOU HAVE ON A TYPICAL DAY: 0

## 2024-10-23 ENCOUNTER — OFFICE VISIT (OUTPATIENT)
Age: 64
End: 2024-10-23
Payer: MEDICARE

## 2024-10-23 VITALS
RESPIRATION RATE: 16 BRPM | OXYGEN SATURATION: 95 % | SYSTOLIC BLOOD PRESSURE: 113 MMHG | HEIGHT: 67 IN | WEIGHT: 170.2 LBS | BODY MASS INDEX: 26.71 KG/M2 | DIASTOLIC BLOOD PRESSURE: 71 MMHG | HEART RATE: 82 BPM

## 2024-10-23 DIAGNOSIS — G93.1 HYPOXIC BRAIN DAMAGE (HCC): ICD-10-CM

## 2024-10-23 DIAGNOSIS — R60.0 BILATERAL LOWER EXTREMITY EDEMA: ICD-10-CM

## 2024-10-23 DIAGNOSIS — Z23 ENCOUNTER FOR IMMUNIZATION: ICD-10-CM

## 2024-10-23 DIAGNOSIS — E78.00 PURE HYPERCHOLESTEROLEMIA: ICD-10-CM

## 2024-10-23 DIAGNOSIS — I25.10 CORONARY ARTERY DISEASE INVOLVING NATIVE CORONARY ARTERY OF NATIVE HEART WITHOUT ANGINA PECTORIS: ICD-10-CM

## 2024-10-23 DIAGNOSIS — C91.10 CLL (CHRONIC LYMPHOCYTIC LEUKEMIA) (HCC): ICD-10-CM

## 2024-10-23 DIAGNOSIS — I10 HYPERTENSION, ESSENTIAL: ICD-10-CM

## 2024-10-23 DIAGNOSIS — Z12.5 PROSTATE CANCER SCREENING: ICD-10-CM

## 2024-10-23 DIAGNOSIS — Z00.00 MEDICARE ANNUAL WELLNESS VISIT, SUBSEQUENT: Primary | ICD-10-CM

## 2024-10-23 DIAGNOSIS — Z71.89 ADVANCED CARE PLANNING/COUNSELING DISCUSSION: ICD-10-CM

## 2024-10-23 DIAGNOSIS — E11.29 DIABETES MELLITUS WITH MICROALBUMINURIA (HCC): ICD-10-CM

## 2024-10-23 DIAGNOSIS — R80.9 DIABETES MELLITUS WITH MICROALBUMINURIA (HCC): ICD-10-CM

## 2024-10-23 PROCEDURE — 99214 OFFICE O/P EST MOD 30 MIN: CPT | Performed by: INTERNAL MEDICINE

## 2024-10-23 ASSESSMENT — ENCOUNTER SYMPTOMS
BLOOD IN STOOL: 0
DIARRHEA: 0
COUGH: 0
CONSTIPATION: 0
ABDOMINAL PAIN: 0
SHORTNESS OF BREATH: 0
VOMITING: 0
NAUSEA: 0

## 2024-10-23 NOTE — ACP (ADVANCE CARE PLANNING)
Advance Care Planning   Advance Care Planning (ACP) Physician/NP/PA (Provider) Conversation    Date of ACP Conversation: 10/23/24  Persons included in Conversation:  patient and brother in law  Length of ACP Conversation in minutes: <16 minutes (Non-Billable)    We discussed the patient’s choices for care and treatment preferences in case of a health event that adversely affects decision-making abilities or is life-limiting. We discusses the differences between FULL CODE and DNR and when to consider a change in code status.  The limitations with CPR were reviewed.    Has NO ACP documents-Information provided.  He elects Full Code (Attempt Resuscitation)    The patient has appointed the following active healthcare agents:    Primary Decision Maker: Gabriela Leon - Other - [phone]    Secondary Decision Maker: Festus Leon - [place] - [phone]     Festus Novoa MD

## 2024-10-23 NOTE — PATIENT INSTRUCTIONS
disorders.  A preventive dental visit is recommended every 6 months.  Try to get at least 150 minutes of exercise per week or 10,000 steps per day on a pedometer .  Order or download the FREE \"Exercise & Physical Activity: Your Everyday Guide\" from [employer] [place] [employer]. Call 1-182.466.1841 or search [employer] [place] [employer] online.  You need 2122-1271 mg of calcium and 8002-5398 IU of vitamin D per day. It is possible to meet your calcium requirement with diet alone, but a vitamin D supplement is usually necessary to meet this goal.  When exposed to the sun, use a sunscreen that protects against both UVA and UVB radiation with an SPF of 30 or greater. Reapply every 2 to 3 hours or after sweating, drying off with a towel, or swimming.  Always wear a seat belt when traveling in a car. Always wear a helmet when riding a bicycle or motorcycle.

## 2024-10-23 NOTE — ASSESSMENT & PLAN NOTE
Chronic, at goal (stable), continue current plan pending work up below as it looks about the same.

## 2024-10-23 NOTE — ASSESSMENT & PLAN NOTE
Poorly controlled, but had been improving on last check.  Unclear control today.  Will check labs.  No medication changes for now.  Stressed diet control as he is doing better but still snacking a lot.

## 2024-10-23 NOTE — PROGRESS NOTES
Medicare Annual Wellness Visit    Edvin Leal is here for Medicare AWV    Assessment & Plan   Medicare annual wellness visit, subsequent  Advanced care planning/counseling discussion  -     FULL CODE  Encounter for immunization  -     pneumococcal 20-valent conjugat (PREVNAR) 0.5 ML MIRNA inj; Inject 0.5 mLs into the muscle once for 1 dose, Disp-0.5 mL, R-0Print  Prostate cancer screening  -     PSA Screening  Diabetes mellitus with microalbuminuria (HCC)  Assessment & Plan:  Poorly controlled, but had been improving on last check.  Unclear control today.  Will check labs.  No medication changes for now.  Stressed diet control as he is doing better but still snacking a lot.   Orders:  -     Comprehensive Metabolic Panel  -     Hemoglobin A1C  -     Lipid Panel  -     Microalbumin / Creatinine Urine Ratio  -     PSA Screening  Bilateral lower extremity edema  Assessment & Plan:   Chronic, at goal (stable), continue current plan pending work up below as it looks about the same.  Orders:  -     Comprehensive Metabolic Panel  Coronary artery disease involving native coronary artery of native heart without angina pectoris  Assessment & Plan:  well controlled, asymptomatic.  BP is well controlled, lipids are well controlled.  Continue: current plan pending review of labs.  Stressed needing to improve blood sugars to prevent further cardiac issues.  Pure hypercholesterolemia  Assessment & Plan:   Chronic, at goal (stable), continue current plan pending work up below  Hypertension, essential  Assessment & Plan:   Chronic, at goal (stable), continue current plan pending work up below  Hypoxic brain damage (HCC)  Assessment & Plan:  Chronic and stable  CLL (chronic lymphocytic leukemia) (HCC)  Assessment & Plan:  Asymptomatic, has f/u with Dr Paz next week. Monitored by specialist- no acute findings meriting change in the plan     Recommendations for Preventive Services Due: see orders and patient

## 2024-10-23 NOTE — ASSESSMENT & PLAN NOTE
well controlled, asymptomatic.  BP is well controlled, lipids are well controlled.  Continue: current plan pending review of labs.  Stressed needing to improve blood sugars to prevent further cardiac issues.

## 2024-10-23 NOTE — ASSESSMENT & PLAN NOTE
Asymptomatic, has f/u with Dr Paz next week. Monitored by specialist- no acute findings meriting change in the plan

## 2024-10-30 DIAGNOSIS — E78.00 PURE HYPERCHOLESTEROLEMIA, UNSPECIFIED: ICD-10-CM

## 2024-10-30 RX ORDER — ATORVASTATIN CALCIUM 80 MG/1
80 TABLET, FILM COATED ORAL DAILY
Qty: 90 TABLET | Refills: 1 | Status: SHIPPED | OUTPATIENT
Start: 2024-10-30

## 2024-10-30 NOTE — TELEPHONE ENCOUNTER
Chief Complaint   Patient presents with    Medication Refill     Last Appointment with Dr. Festus Novoa:  10/23/2024   Future Appointments   Date Time Provider Department Center   4/29/2025  9:20 AM Festus Novoa MD [facility] DEP

## 2024-10-30 NOTE — TELEPHONE ENCOUNTER
Seen last week, waiting on labs.    Future Appointments   Date Time Provider Department Center   4/29/2025  9:20 AM [facility]

## 2024-11-01 DIAGNOSIS — E11.29 TYPE 2 DIABETES MELLITUS WITH OTHER DIABETIC KIDNEY COMPLICATION (HCC): ICD-10-CM

## 2024-11-01 LAB
ALBUMIN SERPL-MCNC: 4.4 G/DL (ref 3.9–4.9)
ALBUMIN/CREAT UR: 5 MG/G CREAT (ref 0–29)
ALP SERPL-CCNC: 184 IU/L (ref 44–121)
ALT SERPL-CCNC: 43 IU/L (ref 0–44)
AST SERPL-CCNC: 46 IU/L (ref 0–40)
BILIRUB SERPL-MCNC: 1.2 MG/DL (ref 0–1.2)
BUN SERPL-MCNC: 13 MG/DL (ref 8–27)
BUN/CREAT SERPL: 17 (ref 10–24)
CALCIUM SERPL-MCNC: 9.5 MG/DL (ref 8.6–10.2)
CHLORIDE SERPL-SCNC: 104 MMOL/L (ref 96–106)
CHOLEST SERPL-MCNC: 112 MG/DL (ref 100–199)
CO2 SERPL-SCNC: 25 MMOL/L (ref 20–29)
CREAT SERPL-MCNC: 0.77 MG/DL (ref 0.76–1.27)
CREAT UR-MCNC: 115.6 MG/DL
EGFRCR SERPLBLD CKD-EPI 2021: 99 ML/MIN/1.73
GLOBULIN SER CALC-MCNC: 2 G/DL (ref 1.5–4.5)
GLUCOSE SERPL-MCNC: 92 MG/DL (ref 70–99)
HBA1C MFR BLD: 7.3 % (ref 4.8–5.6)
HDLC SERPL-MCNC: 26 MG/DL
LDLC SERPL CALC-MCNC: 67 MG/DL (ref 0–99)
MICROALBUMIN UR-MCNC: 6.3 UG/ML
POTASSIUM SERPL-SCNC: 5.1 MMOL/L (ref 3.5–5.2)
PROT SERPL-MCNC: 6.4 G/DL (ref 6–8.5)
PSA SERPL-MCNC: 0.4 NG/ML (ref 0–4)
SODIUM SERPL-SCNC: 142 MMOL/L (ref 134–144)
TRIGL SERPL-MCNC: 101 MG/DL (ref 0–149)
VLDLC SERPL CALC-MCNC: 19 MG/DL (ref 5–40)

## 2024-11-01 RX ORDER — GLIPIZIDE 5 MG/1
5 TABLET ORAL DAILY
Qty: 90 TABLET | Refills: 1 | Status: SHIPPED | OUTPATIENT
Start: 2024-11-01

## 2024-11-01 RX ORDER — SITAGLIPTIN AND METFORMIN HYDROCHLORIDE 500; 50 MG/1; MG/1
1 TABLET, FILM COATED ORAL 2 TIMES DAILY WITH MEALS
Qty: 180 TABLET | Refills: 1 | Status: SHIPPED | OUTPATIENT
Start: 2024-11-01

## 2024-11-01 RX ORDER — POTASSIUM CHLORIDE 1500 MG/1
20 TABLET, EXTENDED RELEASE ORAL DAILY
Qty: 90 TABLET | Refills: 3 | OUTPATIENT
Start: 2024-11-01

## 2024-11-01 RX ORDER — GLIPIZIDE 5 MG/1
5 TABLET ORAL DAILY
Qty: 90 TABLET | Refills: 1 | OUTPATIENT
Start: 2024-11-01

## 2024-11-01 RX ORDER — SITAGLIPTIN AND METFORMIN HYDROCHLORIDE 500; 50 MG/1; MG/1
1 TABLET, FILM COATED ORAL 2 TIMES DAILY WITH MEALS
Qty: 180 TABLET | Refills: 1 | OUTPATIENT
Start: 2024-11-01

## 2024-11-01 NOTE — TELEPHONE ENCOUNTER
Chief Complaint   Patient presents with    Medication Refill     Last Appointment with Dr. Festus Novoa:  10/23/2024   Future Appointments   Date Time Provider Department Center   4/29/2025  9:20 AM Festus Novoa MD [facility] DEP   [facility]

## 2024-11-15 DIAGNOSIS — R60.0 BILATERAL LOWER EXTREMITY EDEMA: ICD-10-CM

## 2024-11-15 DIAGNOSIS — E11.29 TYPE 2 DIABETES MELLITUS WITH OTHER DIABETIC KIDNEY COMPLICATION (HCC): ICD-10-CM

## 2024-11-15 RX ORDER — FUROSEMIDE 40 MG/1
TABLET ORAL
Qty: 135 TABLET | Refills: 1 | OUTPATIENT
Start: 2024-11-15

## 2024-11-15 RX ORDER — POTASSIUM CHLORIDE 1500 MG/1
20 TABLET, EXTENDED RELEASE ORAL DAILY
Qty: 90 TABLET | Refills: 3 | OUTPATIENT
Start: 2024-11-15

## 2025-01-08 ENCOUNTER — TELEPHONE (OUTPATIENT)
Age: 65
End: 2025-01-08

## 2025-01-08 DIAGNOSIS — R91.1 LUNG NODULE: Primary | ICD-10-CM

## 2025-01-08 NOTE — TELEPHONE ENCOUNTER
Please call patient's sister.  He was seen at [facility] on 1/6/25.  CXR was abnormal.  Per radiologist the was a focal density in the R lung base laterally.  He said it could be due to infiltrate, scar, effusion, or a pleural based neoplasm.    Last imaging I have on file for him was in '22 (PET scan and CXR).  I would recommend a CT scan to further clarify what was seen. If they are okay with this I can place the order.  Will be w/ contrast so will need to hold Janumet the day of the CT and for the next 2 days.

## 2025-01-14 ENCOUNTER — HOSPITAL ENCOUNTER (OUTPATIENT)
Facility: HOSPITAL | Age: 65
Discharge: HOME | End: 2025-01-17
Payer: MEDICARE

## 2025-01-14 DIAGNOSIS — R91.1 LUNG NODULE: ICD-10-CM

## 2025-01-14 LAB — CREAT BLD-MCNC: 0.6 MG/DL (ref 0.6–1.3)

## 2025-01-14 PROCEDURE — 6360000004 HC RX CONTRAST MEDICATION: Performed by: INTERNAL MEDICINE

## 2025-01-14 PROCEDURE — 82565 ASSAY OF CREATININE: CPT

## 2025-01-14 PROCEDURE — 71260 CT THORAX DX C+: CPT

## 2025-01-14 RX ORDER — IOPAMIDOL 755 MG/ML
100 INJECTION, SOLUTION INTRAVASCULAR
Status: COMPLETED | OUTPATIENT
Start: 2025-01-14 | End: 2025-01-14

## 2025-01-14 RX ADMIN — IOPAMIDOL 100 ML: 755 INJECTION, SOLUTION INTRAVENOUS at 09:03

## 2025-01-16 NOTE — RESULT ENCOUNTER NOTE
Results reviewed.  Results released via [employer]. Will route to oncologist (Dr Paz).  Please route results to oncologist (Dr Jackson Toro)

## 2025-03-28 PROBLEM — I25.5 ISCHEMIC CARDIOMYOPATHY: Status: ACTIVE | Noted: 2025-03-28

## 2025-04-15 ENCOUNTER — OFFICE VISIT (OUTPATIENT)
Age: 65
End: 2025-04-15
Payer: MEDICARE

## 2025-04-15 ENCOUNTER — APPOINTMENT (OUTPATIENT)
Facility: HOSPITAL | Age: 65
DRG: 292 | End: 2025-04-15
Payer: MEDICARE

## 2025-04-15 ENCOUNTER — HOSPITAL ENCOUNTER (INPATIENT)
Facility: HOSPITAL | Age: 65
LOS: 2 days | Discharge: HOME | DRG: 292 | End: 2025-04-17
Attending: EMERGENCY MEDICINE | Admitting: INTERNAL MEDICINE
Payer: MEDICARE

## 2025-04-15 ENCOUNTER — TELEPHONE (OUTPATIENT)
Age: 65
End: 2025-04-15

## 2025-04-15 VITALS
SYSTOLIC BLOOD PRESSURE: 130 MMHG | OXYGEN SATURATION: 100 % | BODY MASS INDEX: 26.81 KG/M2 | DIASTOLIC BLOOD PRESSURE: 78 MMHG | WEIGHT: 170.8 LBS | TEMPERATURE: 97.5 F | RESPIRATION RATE: 16 BRPM | HEIGHT: 67 IN | HEART RATE: 90 BPM

## 2025-04-15 DIAGNOSIS — Z86.79 HISTORY OF CHF (CONGESTIVE HEART FAILURE): ICD-10-CM

## 2025-04-15 DIAGNOSIS — R80.9 DIABETES MELLITUS WITH MICROALBUMINURIA (HCC): ICD-10-CM

## 2025-04-15 DIAGNOSIS — I25.5 ISCHEMIC CARDIOMYOPATHY: ICD-10-CM

## 2025-04-15 DIAGNOSIS — C91.10 CLL (CHRONIC LYMPHOCYTIC LEUKEMIA) (HCC): ICD-10-CM

## 2025-04-15 DIAGNOSIS — G93.1 HYPOXIC BRAIN DAMAGE (HCC): ICD-10-CM

## 2025-04-15 DIAGNOSIS — E78.00 PURE HYPERCHOLESTEROLEMIA: ICD-10-CM

## 2025-04-15 DIAGNOSIS — I50.9 ACUTE EXACERBATION OF CHRONIC HEART FAILURE (HCC): ICD-10-CM

## 2025-04-15 DIAGNOSIS — I25.10 CORONARY ARTERY DISEASE INVOLVING NATIVE CORONARY ARTERY OF NATIVE HEART WITHOUT ANGINA PECTORIS: ICD-10-CM

## 2025-04-15 DIAGNOSIS — R60.0 BILATERAL LEG EDEMA: ICD-10-CM

## 2025-04-15 DIAGNOSIS — R06.02 SHORTNESS OF BREATH: Primary | ICD-10-CM

## 2025-04-15 DIAGNOSIS — E11.29 DIABETES MELLITUS WITH MICROALBUMINURIA (HCC): Primary | ICD-10-CM

## 2025-04-15 DIAGNOSIS — I10 HYPERTENSION, ESSENTIAL: ICD-10-CM

## 2025-04-15 DIAGNOSIS — R80.9 DIABETES MELLITUS WITH MICROALBUMINURIA (HCC): Primary | ICD-10-CM

## 2025-04-15 DIAGNOSIS — E11.29 DIABETES MELLITUS WITH MICROALBUMINURIA (HCC): ICD-10-CM

## 2025-04-15 LAB
ALBUMIN SERPL-MCNC: 3.6 G/DL (ref 3.5–5)
ALBUMIN SERPL-MCNC: 3.6 G/DL (ref 3.5–5)
ALBUMIN/GLOB SERPL: 1.2 (ref 1.1–2.2)
ALBUMIN/GLOB SERPL: 1.3 (ref 1.1–2.2)
ALP SERPL-CCNC: 162 U/L (ref 45–117)
ALP SERPL-CCNC: 180 U/L (ref 45–117)
ALT SERPL-CCNC: 63 U/L (ref 12–78)
ALT SERPL-CCNC: 81 U/L (ref 12–78)
ANION GAP SERPL CALC-SCNC: 3 MMOL/L (ref 2–12)
ANION GAP SERPL CALC-SCNC: 3 MMOL/L (ref 2–12)
AST SERPL-CCNC: 59 U/L (ref 15–37)
AST SERPL-CCNC: 95 U/L (ref 15–37)
BASOPHILS # BLD: 0 K/UL (ref 0–0.1)
BASOPHILS # BLD: 0.11 K/UL (ref 0–0.1)
BASOPHILS NFR BLD: 0 % (ref 0–1)
BASOPHILS NFR BLD: 2 % (ref 0–1)
BILIRUB SERPL-MCNC: 1.1 MG/DL (ref 0.2–1)
BILIRUB SERPL-MCNC: 1.4 MG/DL (ref 0.2–1)
BUN SERPL-MCNC: 10 MG/DL (ref 6–20)
BUN SERPL-MCNC: 10 MG/DL (ref 6–20)
BUN/CREAT SERPL: 13 (ref 12–20)
BUN/CREAT SERPL: 15 (ref 12–20)
CALCIUM SERPL-MCNC: 9.3 MG/DL (ref 8.5–10.1)
CALCIUM SERPL-MCNC: 9.4 MG/DL (ref 8.5–10.1)
CHLORIDE SERPL-SCNC: 107 MMOL/L (ref 97–108)
CHLORIDE SERPL-SCNC: 107 MMOL/L (ref 97–108)
CO2 SERPL-SCNC: 28 MMOL/L (ref 21–32)
CO2 SERPL-SCNC: 28 MMOL/L (ref 21–32)
COMMENT:: NORMAL
CREAT SERPL-MCNC: 0.66 MG/DL (ref 0.7–1.3)
CREAT SERPL-MCNC: 0.77 MG/DL (ref 0.7–1.3)
DIFFERENTIAL METHOD BLD: ABNORMAL
DIFFERENTIAL METHOD BLD: ABNORMAL
EOSINOPHIL # BLD: 0 K/UL (ref 0–0.4)
EOSINOPHIL # BLD: 0.11 K/UL (ref 0–0.4)
EOSINOPHIL NFR BLD: 0 % (ref 0–7)
EOSINOPHIL NFR BLD: 2 % (ref 0–7)
ERYTHROCYTE [DISTWIDTH] IN BLOOD BY AUTOMATED COUNT: 18.6 % (ref 11.5–14.5)
ERYTHROCYTE [DISTWIDTH] IN BLOOD BY AUTOMATED COUNT: 18.8 % (ref 11.5–14.5)
EST. AVERAGE GLUCOSE BLD GHB EST-MCNC: 143 MG/DL
GLOBULIN SER CALC-MCNC: 2.7 G/DL (ref 2–4)
GLOBULIN SER CALC-MCNC: 3.1 G/DL (ref 2–4)
GLUCOSE SERPL-MCNC: 252 MG/DL (ref 65–100)
GLUCOSE SERPL-MCNC: 98 MG/DL (ref 65–100)
HBA1C MFR BLD: 6.6 % (ref 4–5.6)
HCT VFR BLD AUTO: 30.8 % (ref 36.6–50.3)
HCT VFR BLD AUTO: 34 % (ref 36.6–50.3)
HGB BLD-MCNC: 10.4 G/DL (ref 12.1–17)
HGB BLD-MCNC: 9.3 G/DL (ref 12.1–17)
IMM GRANULOCYTES # BLD AUTO: 0 K/UL
IMM GRANULOCYTES # BLD AUTO: 0 K/UL
IMM GRANULOCYTES NFR BLD AUTO: 0 %
IMM GRANULOCYTES NFR BLD AUTO: 0 %
LYMPHOCYTES # BLD: 1.76 K/UL (ref 0.8–3.5)
LYMPHOCYTES # BLD: 2.02 K/UL (ref 0.8–3.5)
LYMPHOCYTES NFR BLD: 27 % (ref 12–49)
LYMPHOCYTES NFR BLD: 36 % (ref 12–49)
MCH RBC QN AUTO: 26.1 PG (ref 26–34)
MCH RBC QN AUTO: 26.1 PG (ref 26–34)
MCHC RBC AUTO-ENTMCNC: 30.2 G/DL (ref 30–36.5)
MCHC RBC AUTO-ENTMCNC: 30.6 G/DL (ref 30–36.5)
MCV RBC AUTO: 85.4 FL (ref 80–99)
MCV RBC AUTO: 86.5 FL (ref 80–99)
METAMYELOCYTES NFR BLD MANUAL: 1 %
MONOCYTES # BLD: 0.22 K/UL (ref 0–1)
MONOCYTES # BLD: 0.26 K/UL (ref 0–1)
MONOCYTES NFR BLD: 4 % (ref 5–13)
MONOCYTES NFR BLD: 4 % (ref 5–13)
MYELOCYTES NFR BLD MANUAL: 1 %
NEUTS BAND NFR BLD MANUAL: 1 % (ref 0–6)
NEUTS BAND NFR BLD MANUAL: 1 % (ref 0–6)
NEUTS SEG # BLD: 3.02 K/UL (ref 1.8–8)
NEUTS SEG # BLD: 4.48 K/UL (ref 1.8–8)
NEUTS SEG NFR BLD: 53 % (ref 32–75)
NEUTS SEG NFR BLD: 68 % (ref 32–75)
NRBC # BLD: 0 K/UL (ref 0–0.01)
NRBC # BLD: 0 K/UL (ref 0–0.01)
NRBC BLD-RTO: 0 PER 100 WBC
NRBC BLD-RTO: 0 PER 100 WBC
NT PRO BNP: 424 PG/ML
PLATELET # BLD AUTO: 80 K/UL (ref 150–400)
PLATELET # BLD AUTO: 85 K/UL (ref 150–400)
PMV BLD AUTO: 10.1 FL (ref 8.9–12.9)
PMV BLD AUTO: 9.6 FL (ref 8.9–12.9)
POTASSIUM SERPL-SCNC: 4.2 MMOL/L (ref 3.5–5.1)
POTASSIUM SERPL-SCNC: 4.4 MMOL/L (ref 3.5–5.1)
PROT SERPL-MCNC: 6.3 G/DL (ref 6.4–8.2)
PROT SERPL-MCNC: 6.7 G/DL (ref 6.4–8.2)
RBC # BLD AUTO: 3.56 M/UL (ref 4.1–5.7)
RBC # BLD AUTO: 3.98 M/UL (ref 4.1–5.7)
RBC MORPH BLD: ABNORMAL
SODIUM SERPL-SCNC: 138 MMOL/L (ref 136–145)
SODIUM SERPL-SCNC: 138 MMOL/L (ref 136–145)
SPECIMEN HOLD: NORMAL
WBC # BLD AUTO: 5.6 K/UL (ref 4.1–11.1)
WBC # BLD AUTO: 6.5 K/UL (ref 4.1–11.1)
WBC MORPH BLD: ABNORMAL

## 2025-04-15 PROCEDURE — 3078F DIAST BP <80 MM HG: CPT | Performed by: INTERNAL MEDICINE

## 2025-04-15 PROCEDURE — 1126F AMNT PAIN NOTED NONE PRSNT: CPT | Performed by: INTERNAL MEDICINE

## 2025-04-15 PROCEDURE — 85025 COMPLETE CBC W/AUTO DIFF WBC: CPT

## 2025-04-15 PROCEDURE — 3017F COLORECTAL CA SCREEN DOC REV: CPT | Performed by: INTERNAL MEDICINE

## 2025-04-15 PROCEDURE — 93005 ELECTROCARDIOGRAM TRACING: CPT | Performed by: EMERGENCY MEDICINE

## 2025-04-15 PROCEDURE — 80053 COMPREHEN METABOLIC PANEL: CPT

## 2025-04-15 PROCEDURE — 2060000000 HC ICU INTERMEDIATE R&B

## 2025-04-15 PROCEDURE — 2022F DILAT RTA XM EVC RTNOPTHY: CPT | Performed by: INTERNAL MEDICINE

## 2025-04-15 PROCEDURE — 1159F MED LIST DOCD IN RCRD: CPT | Performed by: INTERNAL MEDICINE

## 2025-04-15 PROCEDURE — 99214 OFFICE O/P EST MOD 30 MIN: CPT | Performed by: INTERNAL MEDICINE

## 2025-04-15 PROCEDURE — 1123F ACP DISCUSS/DSCN MKR DOCD: CPT | Performed by: INTERNAL MEDICINE

## 2025-04-15 PROCEDURE — 1160F RVW MEDS BY RX/DR IN RCRD: CPT | Performed by: INTERNAL MEDICINE

## 2025-04-15 PROCEDURE — 99285 EMERGENCY DEPT VISIT HI MDM: CPT

## 2025-04-15 PROCEDURE — 71046 X-RAY EXAM CHEST 2 VIEWS: CPT

## 2025-04-15 PROCEDURE — 1036F TOBACCO NON-USER: CPT | Performed by: INTERNAL MEDICINE

## 2025-04-15 PROCEDURE — 83880 ASSAY OF NATRIURETIC PEPTIDE: CPT

## 2025-04-15 PROCEDURE — G8419 CALC BMI OUT NRM PARAM NOF/U: HCPCS | Performed by: INTERNAL MEDICINE

## 2025-04-15 PROCEDURE — G8427 DOCREV CUR MEDS BY ELIG CLIN: HCPCS | Performed by: INTERNAL MEDICINE

## 2025-04-15 PROCEDURE — 96374 THER/PROPH/DIAG INJ IV PUSH: CPT

## 2025-04-15 PROCEDURE — 6360000002 HC RX W HCPCS: Performed by: STUDENT IN AN ORGANIZED HEALTH CARE EDUCATION/TRAINING PROGRAM

## 2025-04-15 PROCEDURE — 3075F SYST BP GE 130 - 139MM HG: CPT | Performed by: INTERNAL MEDICINE

## 2025-04-15 PROCEDURE — 3046F HEMOGLOBIN A1C LEVEL >9.0%: CPT | Performed by: INTERNAL MEDICINE

## 2025-04-15 RX ORDER — FUROSEMIDE 10 MG/ML
60 INJECTION INTRAMUSCULAR; INTRAVENOUS ONCE
Status: COMPLETED | OUTPATIENT
Start: 2025-04-15 | End: 2025-04-15

## 2025-04-15 RX ORDER — SITAGLIPTIN AND METFORMIN HYDROCHLORIDE 500; 50 MG/1; MG/1
1 TABLET, FILM COATED ORAL 2 TIMES DAILY WITH MEALS
Qty: 180 TABLET | Refills: 3 | Status: SHIPPED | OUTPATIENT
Start: 2025-04-15

## 2025-04-15 RX ADMIN — FUROSEMIDE 60 MG: 10 INJECTION, SOLUTION INTRAMUSCULAR; INTRAVENOUS at 22:08

## 2025-04-15 SDOH — ECONOMIC STABILITY: FOOD INSECURITY: WITHIN THE PAST 12 MONTHS, THE FOOD YOU BOUGHT JUST DIDN'T LAST AND YOU DIDN'T HAVE MONEY TO GET MORE.: NEVER TRUE

## 2025-04-15 SDOH — ECONOMIC STABILITY: FOOD INSECURITY: WITHIN THE PAST 12 MONTHS, YOU WORRIED THAT YOUR FOOD WOULD RUN OUT BEFORE YOU GOT MONEY TO BUY MORE.: NEVER TRUE

## 2025-04-15 ASSESSMENT — PATIENT HEALTH QUESTIONNAIRE - PHQ9
2. FEELING DOWN, DEPRESSED OR HOPELESS: NOT AT ALL
SUM OF ALL RESPONSES TO PHQ QUESTIONS 1-9: 0
1. LITTLE INTEREST OR PLEASURE IN DOING THINGS: NOT AT ALL
SUM OF ALL RESPONSES TO PHQ QUESTIONS 1-9: 0

## 2025-04-15 ASSESSMENT — ENCOUNTER SYMPTOMS
SORE THROAT: 0
DIARRHEA: 0
COUGH: 0
ABDOMINAL DISTENTION: 1
BACK PAIN: 0
RHINORRHEA: 0
STRIDOR: 0
CHEST TIGHTNESS: 0
NAUSEA: 0
WHEEZING: 0
TROUBLE SWALLOWING: 0
SINUS PAIN: 0
ABDOMINAL PAIN: 0
SHORTNESS OF BREATH: 0
SHORTNESS OF BREATH: 1
NAUSEA: 0
VOMITING: 0
BLOOD IN STOOL: 0
EYES NEGATIVE: 1
ABDOMINAL PAIN: 0
VOMITING: 0
DIARRHEA: 0
BLOOD IN STOOL: 0
SINUS PRESSURE: 0
CONSTIPATION: 0
COUGH: 0
COLOR CHANGE: 0

## 2025-04-15 ASSESSMENT — PAIN - FUNCTIONAL ASSESSMENT: PAIN_FUNCTIONAL_ASSESSMENT: NONE - DENIES PAIN

## 2025-04-15 ASSESSMENT — LIFESTYLE VARIABLES
HOW OFTEN DO YOU HAVE A DRINK CONTAINING ALCOHOL: NEVER
HOW MANY STANDARD DRINKS CONTAINING ALCOHOL DO YOU HAVE ON A TYPICAL DAY: PATIENT DOES NOT DRINK

## 2025-04-15 NOTE — ASSESSMENT & PLAN NOTE
Above goal for him and mostly all due to poor diet. Will check labs to verify stability.  Stressed diet changes.

## 2025-04-15 NOTE — ASSESSMENT & PLAN NOTE
well controlled, asymptomatic (no angina).  BP is well controlled, lipids are well controlled.  Continue: current plan pending review of labs.  He has f/u with cardiology this afternoon.

## 2025-04-15 NOTE — ASSESSMENT & PLAN NOTE
Symptomatic with increase in LE edema, breathing today appears unlabored. He has cardiology follow up this afternoon. Will check labs. Will continue with Farxiga. We did review his lasix dose will need to be increased or medication changed, but will defer to cardiology.

## 2025-04-15 NOTE — PROGRESS NOTES
Edvin Leal is a 65 y.o. male who was seen in clinic today (4/15/2025). Patient was seen with Dr Narendra Avitia (R2 at [facility]). He is accompanied by his sister today.    Assessment & Plan:   Below is the assessment and plan developed based on review of pertinent history, physical exam, labs, studies, and medications.    1. Diabetes mellitus with microalbuminuria (HCC)  Assessment & Plan:  Above goal for him and mostly all due to poor diet. Will check labs to verify stability.  Stressed diet changes.  Orders:  -     Comprehensive Metabolic Panel; Future  -     Hemoglobin A1C; Future  -     sitaGLIPtan-metFORMIN (JANUMET)  MG per tablet; Take 1 tablet by mouth 2 times daily (with meals), Disp-180 tablet, R-3Normal  -     CBC with Auto Differential; Future  2. Ischemic cardiomyopathy  Assessment & Plan:  Symptomatic with increase in LE edema, breathing today appears unlabored. He has cardiology follow up this afternoon. Will check labs. Will continue with Farxiga. We did review his lasix dose will need to be increased or medication changed, but will defer to cardiology.    Orders:  -     Comprehensive Metabolic Panel; Future  3. Coronary artery disease involving native coronary artery of native heart without angina pectoris  Assessment & Plan:  well controlled, asymptomatic (no angina).  BP is well controlled, lipids are well controlled.  Continue: current plan pending review of labs.  He has f/u with cardiology this afternoon.  Orders:  -     Comprehensive Metabolic Panel; Future  -     CBC with Auto Differential; Future  4. Hypertension, essential  Assessment & Plan:   Chronic, at goal (stable), continue current plan pending work up below  Orders:  -     Comprehensive Metabolic Panel; Future  -     CBC with Auto Differential; Future  5. Pure hypercholesterolemia  Assessment & Plan:   Chronic, at goal (stable), continue current treatment plan  Orders:  -     Comprehensive Metabolic Panel; Future  -     CBC

## 2025-04-15 NOTE — ED TRIAGE NOTES
Per niece pt has been having increased LE swelling x1.5 weeks. Pt is on Lasix and saw the cardiologist today and was sent here. Pt also reports SOB. Pt denies CP.

## 2025-04-15 NOTE — ASSESSMENT & PLAN NOTE
Asymptomatic, has f/u with Dr Paz this afternoon. Monitored by specialist- no acute findings meriting change in the plan

## 2025-04-16 ENCOUNTER — RESULTS FOLLOW-UP (OUTPATIENT)
Age: 65
End: 2025-04-16

## 2025-04-16 ENCOUNTER — APPOINTMENT (OUTPATIENT)
Facility: HOSPITAL | Age: 65
DRG: 292 | End: 2025-04-16
Payer: MEDICARE

## 2025-04-16 LAB
ALBUMIN SERPL-MCNC: 3.1 G/DL (ref 3.5–5)
ALBUMIN/GLOB SERPL: 1.1 (ref 1.1–2.2)
ALP SERPL-CCNC: 158 U/L (ref 45–117)
ALT SERPL-CCNC: 74 U/L (ref 12–78)
AMPHET UR QL SCN: NEGATIVE
ANION GAP SERPL CALC-SCNC: 3 MMOL/L (ref 2–12)
ANION GAP SERPL CALC-SCNC: 5 MMOL/L (ref 2–12)
APPEARANCE UR: CLEAR
AST SERPL-CCNC: 87 U/L (ref 15–37)
BACTERIA URNS QL MICRO: NEGATIVE /HPF
BARBITURATES UR QL SCN: NEGATIVE
BASOPHILS # BLD: 0 K/UL (ref 0–0.1)
BASOPHILS NFR BLD: 0 % (ref 0–1)
BENZODIAZ UR QL: NEGATIVE
BILIRUB SERPL-MCNC: 0.9 MG/DL (ref 0.2–1)
BILIRUB UR QL: NEGATIVE
BUN SERPL-MCNC: 10 MG/DL (ref 6–20)
BUN SERPL-MCNC: 11 MG/DL (ref 6–20)
BUN/CREAT SERPL: 14 (ref 12–20)
BUN/CREAT SERPL: 17 (ref 12–20)
CALCIUM SERPL-MCNC: 8.9 MG/DL (ref 8.5–10.1)
CALCIUM SERPL-MCNC: 8.9 MG/DL (ref 8.5–10.1)
CANNABINOIDS UR QL SCN: NEGATIVE
CHLORIDE SERPL-SCNC: 106 MMOL/L (ref 97–108)
CHLORIDE SERPL-SCNC: 108 MMOL/L (ref 97–108)
CO2 SERPL-SCNC: 29 MMOL/L (ref 21–32)
CO2 SERPL-SCNC: 30 MMOL/L (ref 21–32)
COCAINE UR QL SCN: NEGATIVE
COLOR UR: NORMAL
CREAT SERPL-MCNC: 0.59 MG/DL (ref 0.7–1.3)
CREAT SERPL-MCNC: 0.76 MG/DL (ref 0.7–1.3)
CRP SERPL-MCNC: <0.29 MG/DL (ref 0–0.3)
DIFFERENTIAL METHOD BLD: ABNORMAL
EKG ATRIAL RATE: 99 BPM
EKG DIAGNOSIS: NORMAL
EKG P AXIS: 57 DEGREES
EKG P-R INTERVAL: 208 MS
EKG Q-T INTERVAL: 370 MS
EKG QRS DURATION: 90 MS
EKG QTC CALCULATION (BAZETT): 474 MS
EKG R AXIS: 5 DEGREES
EKG T AXIS: 69 DEGREES
EKG VENTRICULAR RATE: 99 BPM
EOSINOPHIL # BLD: 0 K/UL (ref 0–0.4)
EOSINOPHIL NFR BLD: 0 % (ref 0–7)
EPITH CASTS URNS QL MICRO: NORMAL /LPF
ERYTHROCYTE [DISTWIDTH] IN BLOOD BY AUTOMATED COUNT: 18.7 % (ref 11.5–14.5)
FERRITIN SERPL-MCNC: 22 NG/ML (ref 26–388)
FOLATE SERPL-MCNC: 26 NG/ML (ref 5–21)
GLOBULIN SER CALC-MCNC: 2.7 G/DL (ref 2–4)
GLUCOSE BLD STRIP.AUTO-MCNC: 122 MG/DL (ref 65–117)
GLUCOSE BLD STRIP.AUTO-MCNC: 139 MG/DL (ref 65–117)
GLUCOSE BLD STRIP.AUTO-MCNC: 139 MG/DL (ref 65–117)
GLUCOSE BLD STRIP.AUTO-MCNC: 141 MG/DL (ref 65–117)
GLUCOSE BLD STRIP.AUTO-MCNC: 186 MG/DL (ref 65–117)
GLUCOSE BLD STRIP.AUTO-MCNC: 195 MG/DL (ref 65–117)
GLUCOSE SERPL-MCNC: 147 MG/DL (ref 65–100)
GLUCOSE SERPL-MCNC: 183 MG/DL (ref 65–100)
GLUCOSE UR STRIP.AUTO-MCNC: NEGATIVE MG/DL
HCT VFR BLD AUTO: 28 % (ref 36.6–50.3)
HGB BLD-MCNC: 8.6 G/DL (ref 12.1–17)
HGB UR QL STRIP: NEGATIVE
HYALINE CASTS URNS QL MICRO: NORMAL /LPF (ref 0–5)
IMM GRANULOCYTES # BLD AUTO: 0 K/UL
IMM GRANULOCYTES NFR BLD AUTO: 0 %
IRON SATN MFR SERPL: 8 % (ref 20–50)
IRON SERPL-MCNC: 21 UG/DL (ref 35–150)
KETONES UR QL STRIP.AUTO: NEGATIVE MG/DL
LEUKOCYTE ESTERASE UR QL STRIP.AUTO: NEGATIVE
LIPASE SERPL-CCNC: 13 U/L (ref 13–75)
LYMPHOCYTES # BLD: 1.25 K/UL (ref 0.8–3.5)
LYMPHOCYTES NFR BLD: 26 % (ref 12–49)
Lab: NORMAL
MAGNESIUM SERPL-MCNC: 1.5 MG/DL (ref 1.6–2.4)
MCH RBC QN AUTO: 25.7 PG (ref 26–34)
MCHC RBC AUTO-ENTMCNC: 30.7 G/DL (ref 30–36.5)
MCV RBC AUTO: 83.8 FL (ref 80–99)
METHADONE UR QL: NEGATIVE
MONOCYTES # BLD: 0.34 K/UL (ref 0–1)
MONOCYTES NFR BLD: 7 % (ref 5–13)
NEUTS SEG # BLD: 3.21 K/UL (ref 1.8–8)
NEUTS SEG NFR BLD: 67 % (ref 32–75)
NITRITE UR QL STRIP.AUTO: NEGATIVE
NRBC # BLD: 0 K/UL (ref 0–0.01)
NRBC BLD-RTO: 0 PER 100 WBC
OPIATES UR QL: NEGATIVE
PCP UR QL: NEGATIVE
PH UR STRIP: 8 (ref 5–8)
PHOSPHATE SERPL-MCNC: 3.3 MG/DL (ref 2.6–4.7)
PLATELET # BLD AUTO: 75 K/UL (ref 150–400)
PMV BLD AUTO: 10.4 FL (ref 8.9–12.9)
POTASSIUM SERPL-SCNC: 3.9 MMOL/L (ref 3.5–5.1)
POTASSIUM SERPL-SCNC: 4.1 MMOL/L (ref 3.5–5.1)
PROT SERPL-MCNC: 5.8 G/DL (ref 6.4–8.2)
PROT UR STRIP-MCNC: NEGATIVE MG/DL
RBC # BLD AUTO: 3.34 M/UL (ref 4.1–5.7)
RBC #/AREA URNS HPF: NORMAL /HPF (ref 0–5)
RBC MORPH BLD: ABNORMAL
RBC MORPH BLD: ABNORMAL
SERVICE CMNT-IMP: ABNORMAL
SODIUM SERPL-SCNC: 140 MMOL/L (ref 136–145)
SODIUM SERPL-SCNC: 141 MMOL/L (ref 136–145)
SP GR UR REFRACTOMETRY: 1.01 (ref 1–1.03)
TIBC SERPL-MCNC: 268 UG/DL (ref 250–450)
TROPONIN I SERPL HS-MCNC: 5 NG/L (ref 0–76)
TROPONIN I SERPL HS-MCNC: 7 NG/L (ref 0–76)
TSH SERPL DL<=0.05 MIU/L-ACNC: 2.03 UIU/ML (ref 0.36–3.74)
URINE CULTURE IF INDICATED: NORMAL
UROBILINOGEN UR QL STRIP.AUTO: 0.2 EU/DL (ref 0.2–1)
VIT B12 SERPL-MCNC: 547 PG/ML (ref 193–986)
WBC # BLD AUTO: 4.8 K/UL (ref 4.1–11.1)
WBC MORPH BLD: ABNORMAL
WBC URNS QL MICRO: NORMAL /HPF (ref 0–4)

## 2025-04-16 PROCEDURE — 83550 IRON BINDING TEST: CPT

## 2025-04-16 PROCEDURE — 86140 C-REACTIVE PROTEIN: CPT

## 2025-04-16 PROCEDURE — 6360000002 HC RX W HCPCS: Performed by: INTERNAL MEDICINE

## 2025-04-16 PROCEDURE — 93010 ELECTROCARDIOGRAM REPORT: CPT | Performed by: INTERNAL MEDICINE

## 2025-04-16 PROCEDURE — 80053 COMPREHEN METABOLIC PANEL: CPT

## 2025-04-16 PROCEDURE — 82607 VITAMIN B-12: CPT

## 2025-04-16 PROCEDURE — 6370000000 HC RX 637 (ALT 250 FOR IP): Performed by: INTERNAL MEDICINE

## 2025-04-16 PROCEDURE — 6360000004 HC RX CONTRAST MEDICATION: Performed by: INTERNAL MEDICINE

## 2025-04-16 PROCEDURE — 81001 URINALYSIS AUTO W/SCOPE: CPT

## 2025-04-16 PROCEDURE — 2500000003 HC RX 250 WO HCPCS: Performed by: INTERNAL MEDICINE

## 2025-04-16 PROCEDURE — 82728 ASSAY OF FERRITIN: CPT

## 2025-04-16 PROCEDURE — 83540 ASSAY OF IRON: CPT

## 2025-04-16 PROCEDURE — 80307 DRUG TEST PRSMV CHEM ANLYZR: CPT

## 2025-04-16 PROCEDURE — 83690 ASSAY OF LIPASE: CPT

## 2025-04-16 PROCEDURE — 82746 ASSAY OF FOLIC ACID SERUM: CPT

## 2025-04-16 PROCEDURE — 71275 CT ANGIOGRAPHY CHEST: CPT

## 2025-04-16 PROCEDURE — 84484 ASSAY OF TROPONIN QUANT: CPT

## 2025-04-16 PROCEDURE — 82962 GLUCOSE BLOOD TEST: CPT

## 2025-04-16 PROCEDURE — 83735 ASSAY OF MAGNESIUM: CPT

## 2025-04-16 PROCEDURE — 85025 COMPLETE CBC W/AUTO DIFF WBC: CPT

## 2025-04-16 PROCEDURE — 84443 ASSAY THYROID STIM HORMONE: CPT

## 2025-04-16 PROCEDURE — 2060000000 HC ICU INTERMEDIATE R&B

## 2025-04-16 PROCEDURE — 84100 ASSAY OF PHOSPHORUS: CPT

## 2025-04-16 RX ORDER — IOPAMIDOL 755 MG/ML
100 INJECTION, SOLUTION INTRAVASCULAR
Status: COMPLETED | OUTPATIENT
Start: 2025-04-16 | End: 2025-04-16

## 2025-04-16 RX ORDER — FERROUS SULFATE 325(65) MG
325 TABLET ORAL
Status: DISCONTINUED | OUTPATIENT
Start: 2025-04-16 | End: 2025-04-16

## 2025-04-16 RX ORDER — ENOXAPARIN SODIUM 100 MG/ML
40 INJECTION SUBCUTANEOUS DAILY
Status: DISCONTINUED | OUTPATIENT
Start: 2025-04-16 | End: 2025-04-16

## 2025-04-16 RX ORDER — IPRATROPIUM BROMIDE AND ALBUTEROL SULFATE 2.5; .5 MG/3ML; MG/3ML
1 SOLUTION RESPIRATORY (INHALATION) EVERY 4 HOURS PRN
Status: DISCONTINUED | OUTPATIENT
Start: 2025-04-16 | End: 2025-04-17 | Stop reason: HOSPADM

## 2025-04-16 RX ORDER — ONDANSETRON 4 MG/1
4 TABLET, ORALLY DISINTEGRATING ORAL EVERY 8 HOURS PRN
Status: DISCONTINUED | OUTPATIENT
Start: 2025-04-16 | End: 2025-04-17 | Stop reason: HOSPADM

## 2025-04-16 RX ORDER — FUROSEMIDE 10 MG/ML
40 INJECTION INTRAMUSCULAR; INTRAVENOUS 2 TIMES DAILY
Status: DISCONTINUED | OUTPATIENT
Start: 2025-04-16 | End: 2025-04-17 | Stop reason: HOSPADM

## 2025-04-16 RX ORDER — POLYETHYLENE GLYCOL 3350 17 G/17G
17 POWDER, FOR SOLUTION ORAL DAILY PRN
Status: DISCONTINUED | OUTPATIENT
Start: 2025-04-16 | End: 2025-04-17 | Stop reason: HOSPADM

## 2025-04-16 RX ORDER — POTASSIUM CHLORIDE 750 MG/1
20 TABLET, EXTENDED RELEASE ORAL DAILY
Status: DISCONTINUED | OUTPATIENT
Start: 2025-04-16 | End: 2025-04-17 | Stop reason: HOSPADM

## 2025-04-16 RX ORDER — SODIUM CHLORIDE 0.9 % (FLUSH) 0.9 %
5-40 SYRINGE (ML) INJECTION EVERY 12 HOURS SCHEDULED
Status: DISCONTINUED | OUTPATIENT
Start: 2025-04-16 | End: 2025-04-17 | Stop reason: HOSPADM

## 2025-04-16 RX ORDER — ACETAMINOPHEN 325 MG/1
650 TABLET ORAL EVERY 6 HOURS PRN
Status: DISCONTINUED | OUTPATIENT
Start: 2025-04-16 | End: 2025-04-17 | Stop reason: HOSPADM

## 2025-04-16 RX ORDER — ACETAMINOPHEN 650 MG/1
650 SUPPOSITORY RECTAL EVERY 6 HOURS PRN
Status: DISCONTINUED | OUTPATIENT
Start: 2025-04-16 | End: 2025-04-17 | Stop reason: HOSPADM

## 2025-04-16 RX ORDER — SODIUM CHLORIDE 0.9 % (FLUSH) 0.9 %
5-40 SYRINGE (ML) INJECTION PRN
Status: DISCONTINUED | OUTPATIENT
Start: 2025-04-16 | End: 2025-04-17 | Stop reason: HOSPADM

## 2025-04-16 RX ORDER — ATORVASTATIN CALCIUM 40 MG/1
80 TABLET, FILM COATED ORAL DAILY
Status: DISCONTINUED | OUTPATIENT
Start: 2025-04-16 | End: 2025-04-17 | Stop reason: HOSPADM

## 2025-04-16 RX ORDER — POTASSIUM CHLORIDE 750 MG/1
40 TABLET, EXTENDED RELEASE ORAL PRN
Status: DISCONTINUED | OUTPATIENT
Start: 2025-04-16 | End: 2025-04-17 | Stop reason: HOSPADM

## 2025-04-16 RX ORDER — MAGNESIUM SULFATE IN WATER 40 MG/ML
2000 INJECTION, SOLUTION INTRAVENOUS ONCE
Status: COMPLETED | OUTPATIENT
Start: 2025-04-16 | End: 2025-04-16

## 2025-04-16 RX ORDER — ONDANSETRON 2 MG/ML
4 INJECTION INTRAMUSCULAR; INTRAVENOUS EVERY 6 HOURS PRN
Status: DISCONTINUED | OUTPATIENT
Start: 2025-04-16 | End: 2025-04-17 | Stop reason: HOSPADM

## 2025-04-16 RX ORDER — SODIUM CHLORIDE 9 MG/ML
INJECTION, SOLUTION INTRAVENOUS PRN
Status: DISCONTINUED | OUTPATIENT
Start: 2025-04-16 | End: 2025-04-17 | Stop reason: HOSPADM

## 2025-04-16 RX ORDER — MAGNESIUM SULFATE IN WATER 40 MG/ML
2000 INJECTION, SOLUTION INTRAVENOUS PRN
Status: DISCONTINUED | OUTPATIENT
Start: 2025-04-16 | End: 2025-04-17 | Stop reason: HOSPADM

## 2025-04-16 RX ORDER — METOLAZONE 2.5 MG/1
5 TABLET ORAL ONCE
Status: COMPLETED | OUTPATIENT
Start: 2025-04-16 | End: 2025-04-16

## 2025-04-16 RX ORDER — POTASSIUM CHLORIDE 7.45 MG/ML
10 INJECTION INTRAVENOUS PRN
Status: DISCONTINUED | OUTPATIENT
Start: 2025-04-16 | End: 2025-04-17 | Stop reason: HOSPADM

## 2025-04-16 RX ORDER — INSULIN LISPRO 100 [IU]/ML
0-8 INJECTION, SOLUTION INTRAVENOUS; SUBCUTANEOUS
Status: DISCONTINUED | OUTPATIENT
Start: 2025-04-16 | End: 2025-04-17 | Stop reason: HOSPADM

## 2025-04-16 RX ADMIN — IRON SUCROSE 100 MG: 20 INJECTION, SOLUTION INTRAVENOUS at 10:05

## 2025-04-16 RX ADMIN — Medication 10 ML: at 20:12

## 2025-04-16 RX ADMIN — EMPAGLIFLOZIN 10 MG: 10 TABLET, FILM COATED ORAL at 10:03

## 2025-04-16 RX ADMIN — Medication 10 ML: at 10:06

## 2025-04-16 RX ADMIN — IOPAMIDOL 70 ML: 755 INJECTION, SOLUTION INTRAVENOUS at 04:39

## 2025-04-16 RX ADMIN — FUROSEMIDE 40 MG: 10 INJECTION, SOLUTION INTRAMUSCULAR; INTRAVENOUS at 18:11

## 2025-04-16 RX ADMIN — ATORVASTATIN CALCIUM 80 MG: 40 TABLET, FILM COATED ORAL at 10:03

## 2025-04-16 RX ADMIN — FUROSEMIDE 40 MG: 10 INJECTION, SOLUTION INTRAMUSCULAR; INTRAVENOUS at 10:04

## 2025-04-16 RX ADMIN — INSULIN LISPRO 2 UNITS: 100 INJECTION, SOLUTION INTRAVENOUS; SUBCUTANEOUS at 13:10

## 2025-04-16 RX ADMIN — METOLAZONE 5 MG: 2.5 TABLET ORAL at 13:10

## 2025-04-16 RX ADMIN — POTASSIUM CHLORIDE 20 MEQ: 750 TABLET, FILM COATED, EXTENDED RELEASE ORAL at 10:06

## 2025-04-16 RX ADMIN — MAGNESIUM SULFATE HEPTAHYDRATE 2000 MG: 40 INJECTION, SOLUTION INTRAVENOUS at 10:15

## 2025-04-16 NOTE — ED NOTES
ED SIGN OUT NOTE  Care assumed at [facility] 9:01 PM EDT    Patient was signed out to me by Dr. Hightower.     Patient is awaiting rooming, labs, and CXR. Anticipated admission. Cardiologist sent for admission for IV diuresis. History of congestive heart failure and chronic lymphocytic leukemia, diabetes and hypertension. He does have hypoxic brain damage. He is on oral diuretics at home but apparently has been retaining weight and developing increasing swelling in his abdomen as well as his lower extremities. He saw the cardiologist today who told him that oral diuretics were not working and he needed to be admitted to the hospital for IV medications.     BP (!) 139/58   Pulse 97   Temp 98 °F (36.7 °C) (Tympanic)   Resp 22   Ht 1.753 m (5' 9\")   Wt 78.2 kg (172 lb 6.4 oz)   SpO2 98%   BMI 25.46 kg/m²     Labs Reviewed   CBC WITH AUTO DIFFERENTIAL - Abnormal; Notable for the following components:       Result Value    RBC 3.98 (*)     Hemoglobin 10.4 (*)     Hematocrit 34.0 (*)     RDW 18.8 (*)     Platelets 85 (*)     Monocytes % 4 (*)     Basophils % 2 (*)     Metamyelocytes 1 (*)     Myelocytes 1 (*)     Basophils Absolute 0.11 (*)     All other components within normal limits   COMPREHENSIVE METABOLIC PANEL - Abnormal; Notable for the following components:    Glucose 252 (*)     Total Bilirubin 1.1 (*)     ALT 81 (*)     AST 95 (*)     Alk Phosphatase 180 (*)     All other components within normal limits   BRAIN NATRIURETIC PEPTIDE - Abnormal; Notable for the following components:    NT Pro- (*)     All other components within normal limits   EXTRA TUBES HOLD     XR CHEST (2 VW)   Final Result      Mediastinal lymphadenopathy and parenchymal opacity right lower lobe similar to   that seen on the prior CT. No convincing acute abnormality.         Electronically signed by GAMAL KAUR          ED Course as of 04/15/25 2142   Tue Apr 15, 2025   2132 XR CHEST (2 VW)     IMPRESSION:

## 2025-04-16 NOTE — ED PROVIDER NOTES
[facility] EMERGENCY DEPARTMENT  EMERGENCY DEPARTMENT ENCOUNTER      Pt Name: Edvin Leal  MRN: [id number]  Birthdate 1960  Date of evaluation: 4/15/2025  Provider: Baltazar Hightower MD    CHIEF COMPLAINT       Chief Complaint   Patient presents with    Leg Swelling         HISTORY OF PRESENT ILLNESS    This is a 65-year-old male who is normally followed apparently by physician at [facility].  He has a history of congestive heart failure and chronic lymphocytic leukemia, diabetes and hypertension.  He does have hypoxic brain damage.  He is on oral diuretics at home but apparently has been retaining weight and developing increasing swelling in his abdomen as well as his lower extremities.  He saw the cardiologist today who told him that oral diuretics were not working and he needed to be admitted to the hospital for IV medications.  For that reason he was brought in.  He does not have any history of chest pain, fever or chills, nausea or vomiting or other acute symptomatology.  It has been only increased weight gain, increasing shortness of breath and swelling in his abdomen and both lower extremities.            Review of External Medical Records:     Nursing Notes were reviewed.    REVIEW OF SYSTEMS       Review of Systems   Constitutional:  Negative for activity change, chills, fatigue and fever.   HENT:  Negative for congestion, ear pain, rhinorrhea, sinus pressure, sinus pain, sore throat and trouble swallowing.    Eyes: Negative.    Respiratory:  Positive for shortness of breath. Negative for cough, chest tightness, wheezing and stridor.    Cardiovascular:  Positive for leg swelling. Negative for chest pain and palpitations.   Gastrointestinal:  Positive for abdominal distention. Negative for abdominal pain, blood in stool, diarrhea, nausea and vomiting.   Endocrine: Negative.    Genitourinary:  Negative for decreased urine volume, difficulty urinating, flank pain, frequency and hematuria.    Musculoskeletal:  Negative for back pain, joint swelling and neck pain.   Skin:  Negative for color change, pallor and rash.   Neurological:  Negative for dizziness, syncope, speech difficulty, weakness, numbness and headaches.   Psychiatric/Behavioral:  Negative for agitation and behavioral problems.              PAST MEDICAL HISTORY     Past Medical History:   Diagnosis Date    Cerumen impaction     CHF (congestive heart failure) ([facility])     CLL (chronic lymphocytic leukemia) ([facility]) 06/27/2014    Hematologist: Dr Paz     Diabetes ([facility])     Ear problems     Hearing loss     Hearing reduced     Hyperlipidemia     Hypertension     Hypoxic brain damage ([facility]) 06/17/2014    1 yr old, developed PNA, was hypoxic, since then has had issues     Ill-defined condition          SURGICAL HISTORY       Past Surgical History:   Procedure Laterality Date    CARDIAC CATHETERIZATION  02/05/2021    significant multiple vessel disease, no intervention, referred for CABG    COLONOSCOPY  01/11/2021    inadequate - poor prep - [place]    COLONOSCOPY  2010    no record available    CORONARY ARTERY BYPASS GRAFT  02/08/2021    LIMA to LAD, SVG to OM1, SVG to PDA    IR PORT PLACEMENT > 5 YEARS  07/08/2022    IR PORT PLACEMENT EQUAL OR GREATER THAN 5 YEARS 7/8/2022 [facility] RAD ANGIO IR    OTHER SURGICAL HISTORY  03/07/2016    CT guided BM bx         CURRENT MEDICATIONS       Previous Medications    ASPIRIN (VAZALORE) 81 MG CAPS    ceived the following from [facility]: Outside name: aspirin 81 mg cap    ATORVASTATIN (LIPITOR) 80 MG TABLET    TAKE 1 TABLET BY MOUTH EVERY DAY    DAPAGLIFLOZIN (FARXIGA) 10 MG TABLET    Take 1 tablet by mouth every morning    FERROUS SULFATE (IRON 325) 325 (65 FE) MG TABLET    Take 1 tablet by mouth daily (with breakfast)    FUROSEMIDE (LASIX) 40 MG TABLET    TAKE 1 TABLET BY MOUTH IN THE MORNING AND 1/2 TAB IN THE AFTERNOON    GLIPIZIDE (GLUCOTROL) 5 MG TABLET    TAKE 1 TABLET BY MOUTH EVERY DAY

## 2025-04-16 NOTE — H&P
History and Physical    Date of Service:  4/16/2025  Primary Care Provider: Festus Novoa MD  Source of information: The patient and review of EMR    Chief Complaint: Leg Swelling      History of Presenting Illness:   Edvin Leal is a 65 y.o. male with past medical history significant for CHF, type 2 diabetes, dyslipidemia, ischemic cardiomyopathy, hypertension, hypoxic brain damage, chronic lymphocytic leukemia presented at the emergency room with increasing bilateral leg swelling.  Patient has also been having shortness of breath which is progressively getting worse especially with ambulation and also when the patient is lying down.  Was seen by his cardiologist today and patient was told that he needed admission to the hospital for IV diuretic.  Patient presented at the emergency room for that reason.  There was no associated chest pain or palpitation.  Patient is not a good historian because of his underlying hypoxic brain injury.  Patient was last admitted to the hospital from 3/5/2025 to 3/14/2025, patient was admitted to [facility] and treated for CHF exacerbation.   He presented at the emergency room with BNP level of 424.  Patient received 60 mg of IV Lasix in the emergency room and was referred to the hospitalist service for admission.           REVIEW OF SYSTEMS:  Review of systems not obtained due to patient factors.     Past Medical History:   Diagnosis Date    Cerumen impaction     CHF (congestive heart failure) ([facility])     CLL (chronic lymphocytic leukemia) ([facility]) 06/27/2014    Hematologist: Dr Paz     Diabetes ([facility])     Ear problems     Hearing loss     Hearing reduced     Hyperlipidemia     Hypertension     Hypoxic brain damage ([facility]) 06/17/2014    1 yr old, developed PNA, was hypoxic, since then has had issues     Ill-defined condition       Past Surgical History:   Procedure Laterality Date    CARDIAC CATHETERIZATION  02/05/2021    significant multiple vessel disease, no  intervention, referred for CABG    COLONOSCOPY  01/11/2021    inadequate - poor prep - [facility]    COLONOSCOPY  2010    no record available    CORONARY ARTERY BYPASS GRAFT  02/08/2021    LIMA to LAD, SVG to OM1, SVG to PDA    IR PORT PLACEMENT > 5 YEARS  07/08/2022    IR PORT PLACEMENT EQUAL OR GREATER THAN 5 YEARS 7/8/2022 [facility] RAD ANGIO IR    OTHER SURGICAL HISTORY  03/07/2016    CT guided BM bx     Prior to Admission medications    Medication Sig Start Date End Date Taking? Authorizing Provider   sitaGLIPtan-metFORMIN (JANUMET)  MG per tablet Take 1 tablet by mouth 2 times daily (with meals) 4/15/25   Festus Novoa MD   glipiZIDE (GLUCOTROL) 5 MG tablet TAKE 1 TABLET BY MOUTH EVERY DAY 11/1/24   Festus Novoa MD   atorvastatin (LIPITOR) 80 MG tablet TAKE 1 TABLET BY MOUTH EVERY DAY 10/30/24   Festus Novoa MD   potassium chloride (K-TAB) 20 MEQ TBCR extended release tablet TAKE 1 TABLET BY MOUTH EVERY DAY 7/21/24   Festus Novoa MD   furosemide (LASIX) 40 MG tablet TAKE 1 TABLET BY MOUTH IN THE MORNING AND 1/2 TAB IN THE AFTERNOON 6/17/24   Festus Novoa MD   Lancets MISC Test BS daily.DX:250.00 2/22/17   Automatic Reconciliation, Ar   Aspirin (VAZALORE) 81 MG CAPS ceived the following from [facility]: Outside name: aspirin 81 mg cap 2/1/22   Automatic Reconciliation, Ar   dapagliflozin (FARXIGA) 10 MG tablet Take 1 tablet by mouth every morning    ProviderRhett MD   ferrous sulfate (IRON 325) 325 (65 Fe) MG tablet Take 1 tablet by mouth daily (with breakfast)    Rhett Dean MD     Allergies   Allergen Reactions    Penicillins Anaphylaxis      Family History   Adopted: Yes   Problem Relation Age of Onset    COPD Mother     Lung Cancer Father         lung cancer - small oat cell    No Known Problems Sister     High Cholesterol Brother     Hypertension Brother     Diabetes Brother       Social History:  reports that he has never

## 2025-04-16 NOTE — CARE COORDINATION
Care Management Initial Assessment       RUR: 15%  Readmission? No  1st IM letter given? Yes - 4/15  1st  letter given: No    CM attempted to perform bedside assessment to verify demographics and insurance info. Pt refused. CM to defer to family member and Gabriela Morrow.    Pt currently lives with his niece and her two children (Gabriela's daughter) in a single level home. Pt ambulates independently and performs ADLs independently. Pt does not own DME. No hx of HH,SNF,IPR. Family takes turns transporting pt as needed.     PCP verified.     Ph[facility] [address] [facility] [place] [facility] [place]     CM will continue to follow.    Francesca VILLA CM    Via Perfect Serve      04/16/25 1243   Service Assessment   Patient Orientation Alert and Oriented;Person;Place;Situation;Self;Other (see comment)  (Refused assessment)   History Provided By Child/Family  (Sister, Gabriela)   Primary Caregiver Family   Support Systems Family Members   Patient's Healthcare Decision Maker is: Named in Scanned ACP Document   PCP Verified by CM Yes   Last Visit to PCP Within last 3 months   Prior Functional Level Independent in ADLs/IADLs   Current Functional Level Independent in ADLs/IADLs   Can patient return to prior living arrangement Yes   Ability to make needs known: Good   Family able to assist with home care needs: Yes   Would you like for me to discuss the discharge plan with any other family members/significant others, and if so, who? Yes   Financial Resources Medicare   CM/SW Referral ADLs/IADLs   Social/Functional History   Lives With Family   Type of Home House   Home Layout One level   Home Equipment None   Receives Help From Family   Prior Level of Assist for ADLs Independent   Prior Level of Assist for Homemaking Independent   Homemaking Responsibilities No   Ambulation Assistance Independent   Prior Level of Assist for Transfers Independent   Active  No   Occupation On disability   Discharge Planning   Type of Residence

## 2025-04-16 NOTE — RESULT ENCOUNTER NOTE
Results released to patient via Slated.  All labs are stable or at goal for him.  Compared to [facility] labs, HGB is low but stable. PLT count low/stable. DM improved but not sure how much anemia is impacting it. LFT's stable.    Patient was seen by cardiology and sent to ER for IV diuresis, admitted to [facility] currently.

## 2025-04-16 NOTE — PROGRESS NOTES
[facility] Adult  Hospitalist Group                                                                                          Hospitalist Progress Note  Sven Tilley MD  Answering service: [phone] OR [phone] from in house phone        Date of Service:  2025  NAME:  Edvin Leal  :  1960  MRN:  [id number]       Admission Summary:   Edvin Leal is a 65 y.o. male with past medical history significant for CHF, type 2 diabetes, dyslipidemia, ischemic cardiomyopathy, hypertension, hypoxic brain damage, chronic lymphocytic leukemia presented at the emergency room with increasing bilateral leg swelling.  Patient has also been having shortness of breath which is progressively getting worse especially with ambulation and also when the patient is lying down.  Was seen by his cardiologist today and patient was told that he needed admission to the hospital for IV diuretic.  Patient presented at the emergency room for that reason.  There was no associated chest pain or palpitation.  Patient is not a good historian because of his underlying hypoxic brain injury.  Patient was last admitted to the hospital from 3/5/2025 to 3/14/2025, patient was admitted to [facility] and treated for CHF exacerbation.   He presented at the emergency room with BNP level of 424.  Patient received 60 mg of IV Lasix in the emergency room and was referred to the hospitalist service for admission.       Interval history / Subjective:   Poor historian due to underlying hypoxic brain injury.  Admitted with progressively worsening shortness of breath and bilateral lower extremity edema.  He denies any shortness of breath at the time of my visit.  Persistent bilateral lower extremity edema.     Assessment & Plan:           Acute on chronic CHF exacerbation with reduced EF;  Ischemic cardiomyopathy;  Coronary artery disease;  -Continue diuretics;  -Continue GDMT;  -Echo ordered and pending;  -Strict ins and outs;  -Daily

## 2025-04-16 NOTE — NURSE NAVIGATOR
HEART FAILURE NURSE NAVIGATOR NOTE  [facility]    Patient chart was reviewed by Heart Failure (HF) Nurse Navigators for compliance of prescribed treatment with guidelines directed medical therapy (GDMT) and HF database completed.     Please, review beneath recommendations for symptomatic patients with HF with Mildly Reduced Ejection Fraction 41-49% (HFmrEF) for your consideration when taking care of this patient.    Current Medical Therapy:      Name Edvin Leal    1960   LVEF  45/50%   NYHA Functional Class  Documentation needed   ARNi/ACEi/ARB    Beta-blocker    Aldosterone Antagonist    SGLT2 inhibitor  Jardiance   Hydralazine/Isosorbide Dinitrate    Consulting Cardiologist  [facility]     Recommendations:    For patients with previously normal or newly diagnosed HFmrEF 41-49%, consider adding the following GDMT, if appropriate:  SGLT2 inhibitor [Class 2a]  ARNi/ACEi/ARB [Class 2b]  Beta-blockers (carvedilol, sustained-release metoprolol succinate or bisoprolol) [Class 2b]  Aldosterone antagonists GFR > 30 and K< 5 [Class 2b]  Adjust diuretic dose at discharge if hospitalized for volume overload [Class 1]    For patients who have recovered LVEF to 41-49%, GDMT should be continued to prevent heart failure and relapse of LV dysfunction [Class 1].   Please, add the following GDMT [Class 1] or document in discharge summary/progress note why patient cannot take the medication:  ARNi/ACEi or ARB  Beta-blockers (carvedilol, sustained-release metoprolol succinate or bisoprolol)  Aldosterone antagonists GFR > 30 and K< 5  SGLT2 inhibitor  Hydralazine/Isosorbide dinitrate for  Americans with Class III/IV symptoms  Adjust diuretic dose at discharge if hospitalized for volume overload  Note: the following medications may be potentially harmful in heart failure [Class 3]:  Calcium channel blockers (doxazosin, diltiazem, verapamil, nifedipine)  Antiarrhythmics (flecanide,

## 2025-04-16 NOTE — ED NOTES
ED TO INPATIENT SBAR HANDOFF    Patient Name: Edvin Leal   :  1960  65 y.o.   MRN:  [id number]  ED Room #:  ER09/09     Situation  Code Status: Full Code   Allergies: Penicillins  Weight: Patient Vitals for the past 96 hrs (Last 3 readings):   Weight   04/15/25 2126 78.2 kg (172 lb 6.4 oz)   04/15/25 193 78.2 kg (172 lb 6.4 oz)       Arrived from: home    Chief Complaint:   Chief Complaint   Patient presents with    Leg Swelling       Hospital Problem/Diagnosis:  Principal Problem:    Acute exacerbation of chronic heart failure (HCC)  Resolved Problems:    * No resolved hospital problems. *      Mobility: new onset weakness   ED Fall Risk: Presents to emergency department  because of falls (Syncope, seizure, or loss of consciousness): No, Age > 70: No, Altered Mental Status, Intoxication with alcohol or substance confusion (Disorientation, impaired judgment, poor safety awaremess, or inability to follow instructions): No, Impaired Mobility: Ambulates or transfers with assistive devices or assistance; Unable to ambulate or transer.: No, Nursing Judgement: No   Fell in ED or prior to admission: no   Restraints: no     Sitter: no   Family/Caregiver Present: no    Neet to know social/safety information: developmental delay, lives with niece/caregiver    Background  History:   Past Medical History:   Diagnosis Date    Cerumen impaction     CHF (congestive heart failure) ([facility])     CLL (chronic lymphocytic leukemia) ([facility]) 2014    Hematologist: Dr Paz     Diabetes ([facility])     Ear problems     Hearing loss     Hearing reduced     Hyperlipidemia     Hypertension     Hypoxic brain damage ([facility]) 2014    1 yr old, developed PNA, was hypoxic, since then has had issues     Ill-defined condition        Assessment    Abnormal Assessment Findings:   Imaging:   XR CHEST (2 VW)   Final Result      Mediastinal lymphadenopathy and parenchymal opacity right lower lobe similar to   that seen on the prior CT. No  the following components:    Folate 26.0 (*)     All other components within normal limits   POCT GLUCOSE - Abnormal; Notable for the following components:    POC Glucose 141 (*)     All other components within normal limits         Vitals/MEWS: MEWS Score: 2  Level of Consciousness: Alert (0)   Vitals:    04/16/25 0030 04/16/25 0200 04/16/25 0300 04/16/25 0500   BP: (!) 110/41 (!) 101/53 (!) 97/46 118/68   Pulse: (!) 101 98 (!) 101 (!) 101   Resp: 18 17 20 21   Temp:       TempSrc:       SpO2: 93% 95% 94% 97%   Weight:       Height:         DI:   Predictive Model Details          24 (Normal)  Factor Value    Calculated 4/16/2025 05:47 48% Age 65 years old    Deterioration Index Model 29% Respiratory rate 21     10% Pulse 101     7% Hematocrit abnormal (28.0 %)     2% Systolic 118     2% Platelet count abnormal (75 K/uL)     1% WBC count 4.8 K/uL     0% Pulse oximetry 97 %     0% Sodium 140 mmol/L     0% Temperature 98 °F (36.7 °C)     0% Potassium 3.9 mmol/L         FiO2 (%):   O2 Flow Rate: O2 Device: None (Room air)    Cardiac Rhythm:      Pain Scale: Pain Assessment  Pain Assessment: None - Denies Pain  Last documented pain score: (0-10 scale)    Last documented pain medication administered:      Mental Status: pt at baseline, developmental delay, garbled speech  Orientation Level:  x3  NIH Score:    C-SSRS: Risk of Suicide: No Risk    PO Status: Regular  Bedside swallow:    Isacc Coma Scale (GCS): Isacc Coma Scale  Eye Opening: Spontaneous  Best Verbal Response: Oriented  Best Motor Response: Obeys commands  Isacc Coma Scale Score: 15    Active LDA's:   Peripheral IV 04/15/25 Left Antecubital (Active)   Site Assessment Clean, dry & intact 04/1960   Line Status Blood return noted;Capped;Flushed;Normal saline locked;Specimen collected 04/1960   Line Care Connections checked and tightened 04/1960   Phlebitis Assessment No symptoms 04/1960   Infiltration Assessment 0 04/1960

## 2025-04-17 ENCOUNTER — APPOINTMENT (OUTPATIENT)
Facility: HOSPITAL | Age: 65
DRG: 292 | End: 2025-04-17
Attending: INTERNAL MEDICINE
Payer: MEDICARE

## 2025-04-17 VITALS
BODY MASS INDEX: 23.03 KG/M2 | RESPIRATION RATE: 20 BRPM | TEMPERATURE: 97.2 F | HEART RATE: 85 BPM | OXYGEN SATURATION: 96 % | HEIGHT: 69 IN | DIASTOLIC BLOOD PRESSURE: 67 MMHG | WEIGHT: 155.5 LBS | SYSTOLIC BLOOD PRESSURE: 115 MMHG

## 2025-04-17 PROBLEM — I50.9 ACUTE EXACERBATION OF CHRONIC HEART FAILURE (HCC): Status: RESOLVED | Noted: 2025-04-15 | Resolved: 2025-04-17

## 2025-04-17 LAB
ALBUMIN SERPL-MCNC: 3.2 G/DL (ref 3.5–5)
ALBUMIN/GLOB SERPL: 1.1 (ref 1.1–2.2)
ALP SERPL-CCNC: 148 U/L (ref 45–117)
ALT SERPL-CCNC: 81 U/L (ref 12–78)
ANION GAP SERPL CALC-SCNC: 5 MMOL/L (ref 2–12)
AST SERPL-CCNC: 91 U/L (ref 15–37)
BILIRUB SERPL-MCNC: 1.2 MG/DL (ref 0.2–1)
BUN SERPL-MCNC: 14 MG/DL (ref 6–20)
BUN/CREAT SERPL: 19 (ref 12–20)
CALCIUM SERPL-MCNC: 9.4 MG/DL (ref 8.5–10.1)
CHLORIDE SERPL-SCNC: 104 MMOL/L (ref 97–108)
CO2 SERPL-SCNC: 30 MMOL/L (ref 21–32)
CREAT SERPL-MCNC: 0.73 MG/DL (ref 0.7–1.3)
ECHO AO ROOT DIAM: 3.1 CM
ECHO AO ROOT INDEX: 1.67 CM/M2
ECHO AV AREA PEAK VELOCITY: 1.9 CM2
ECHO AV AREA VTI: 1.7 CM2
ECHO AV AREA/BSA PEAK VELOCITY: 1 CM2/M2
ECHO AV AREA/BSA VTI: 0.9 CM2/M2
ECHO AV MEAN GRADIENT: 5 MMHG
ECHO AV MEAN VELOCITY: 1.1 M/S
ECHO AV PEAK GRADIENT: 10 MMHG
ECHO AV PEAK VELOCITY: 1.6 M/S
ECHO AV VELOCITY RATIO: 0.56
ECHO AV VTI: 29.9 CM
ECHO BSA: 1.95 M2
ECHO LA DIAMETER INDEX: 1.94 CM/M2
ECHO LA DIAMETER: 3.6 CM
ECHO LA TO AORTIC ROOT RATIO: 1.16
ECHO LA VOL A-L A4C: 54 ML (ref 18–58)
ECHO LA VOL MOD A4C: 51 ML (ref 18–58)
ECHO LA VOLUME INDEX A-L A4C: 29 ML/M2 (ref 16–34)
ECHO LA VOLUME INDEX MOD A4C: 27 ML/M2 (ref 16–34)
ECHO LV E' LATERAL VELOCITY: 11.28 CM/S
ECHO LV E' SEPTAL VELOCITY: 8.33 CM/S
ECHO LV EDV A2C: 106 ML
ECHO LV EDV A4C: 103 ML
ECHO LV EDV BP: 109 ML (ref 67–155)
ECHO LV EDV INDEX A4C: 55 ML/M2
ECHO LV EDV INDEX BP: 59 ML/M2
ECHO LV EDV NDEX A2C: 57 ML/M2
ECHO LV EF PHYSICIAN: 50 %
ECHO LV EJECTION FRACTION A2C: 51 %
ECHO LV EJECTION FRACTION A4C: 58 %
ECHO LV EJECTION FRACTION BIPLANE: 56 % (ref 55–100)
ECHO LV ESV A2C: 52 ML
ECHO LV ESV A4C: 44 ML
ECHO LV ESV BP: 48 ML (ref 22–58)
ECHO LV ESV INDEX A2C: 28 ML/M2
ECHO LV ESV INDEX A4C: 24 ML/M2
ECHO LV ESV INDEX BP: 26 ML/M2
ECHO LV FRACTIONAL SHORTENING: 26 % (ref 28–44)
ECHO LV INTERNAL DIMENSION DIASTOLE INDEX: 2.26 CM/M2
ECHO LV INTERNAL DIMENSION DIASTOLIC: 4.2 CM (ref 4.2–5.9)
ECHO LV INTERNAL DIMENSION SYSTOLIC INDEX: 1.67 CM/M2
ECHO LV INTERNAL DIMENSION SYSTOLIC: 3.1 CM
ECHO LV IVSD: 0.6 CM (ref 0.6–1)
ECHO LV MASS 2D: 70 G (ref 88–224)
ECHO LV MASS INDEX 2D: 37.6 G/M2 (ref 49–115)
ECHO LV POSTERIOR WALL DIASTOLIC: 0.6 CM (ref 0.6–1)
ECHO LV RELATIVE WALL THICKNESS RATIO: 0.29
ECHO LVOT AREA: 3.1 CM2
ECHO LVOT AV VTI INDEX: 0.54
ECHO LVOT DIAM: 2 CM
ECHO LVOT MEAN GRADIENT: 2 MMHG
ECHO LVOT PEAK GRADIENT: 4 MMHG
ECHO LVOT PEAK VELOCITY: 0.9 M/S
ECHO LVOT STROKE VOLUME INDEX: 27.2 ML/M2
ECHO LVOT SV: 50.6 ML
ECHO LVOT VTI: 16.1 CM
ECHO MV A VELOCITY: 1.26 M/S
ECHO MV AREA PHT: 2.7 CM2
ECHO MV AREA VTI: 1.8 CM2
ECHO MV E DECELERATION TIME (DT): 276.4 MS
ECHO MV E VELOCITY: 0.95 M/S
ECHO MV E/A RATIO: 0.75
ECHO MV E/E' LATERAL: 8.42
ECHO MV E/E' RATIO (AVERAGED): 9.91
ECHO MV E/E' SEPTAL: 11.4
ECHO MV LVOT VTI INDEX: 1.78
ECHO MV MAX VELOCITY: 1.3 M/S
ECHO MV MEAN GRADIENT: 4 MMHG
ECHO MV MEAN VELOCITY: 0.9 M/S
ECHO MV PEAK GRADIENT: 7 MMHG
ECHO MV PRESSURE HALF TIME (PHT): 80.2 MS
ECHO MV VTI: 28.6 CM
ECHO PV MAX VELOCITY: 0.9 M/S
ECHO PV PEAK GRADIENT: 4 MMHG
ECHO RV TAPSE: 1.4 CM (ref 1.7–?)
ECHO TV REGURGITANT MAX VELOCITY: 1.72 M/S
ECHO TV REGURGITANT PEAK GRADIENT: 12 MMHG
ERYTHROCYTE [DISTWIDTH] IN BLOOD BY AUTOMATED COUNT: 18.7 % (ref 11.5–14.5)
GLOBULIN SER CALC-MCNC: 2.9 G/DL (ref 2–4)
GLUCOSE BLD STRIP.AUTO-MCNC: 111 MG/DL (ref 65–117)
GLUCOSE SERPL-MCNC: 111 MG/DL (ref 65–100)
HCT VFR BLD AUTO: 30.2 % (ref 36.6–50.3)
HGB BLD-MCNC: 9.3 G/DL (ref 12.1–17)
MAGNESIUM SERPL-MCNC: 1.9 MG/DL (ref 1.6–2.4)
MCH RBC QN AUTO: 25.8 PG (ref 26–34)
MCHC RBC AUTO-ENTMCNC: 30.8 G/DL (ref 30–36.5)
MCV RBC AUTO: 83.9 FL (ref 80–99)
NRBC # BLD: 0 K/UL (ref 0–0.01)
NRBC BLD-RTO: 0 PER 100 WBC
PHOSPHATE SERPL-MCNC: 4.1 MG/DL (ref 2.6–4.7)
PLATELET # BLD AUTO: 73 K/UL (ref 150–400)
PMV BLD AUTO: 10.3 FL (ref 8.9–12.9)
POTASSIUM SERPL-SCNC: 3.8 MMOL/L (ref 3.5–5.1)
PROT SERPL-MCNC: 6.1 G/DL (ref 6.4–8.2)
RBC # BLD AUTO: 3.6 M/UL (ref 4.1–5.7)
SERVICE CMNT-IMP: NORMAL
SODIUM SERPL-SCNC: 139 MMOL/L (ref 136–145)
TSH SERPL DL<=0.05 MIU/L-ACNC: 1.7 UIU/ML (ref 0.36–3.74)
WBC # BLD AUTO: 5 K/UL (ref 4.1–11.1)

## 2025-04-17 PROCEDURE — 97165 OT EVAL LOW COMPLEX 30 MIN: CPT

## 2025-04-17 PROCEDURE — 83735 ASSAY OF MAGNESIUM: CPT

## 2025-04-17 PROCEDURE — C8929 TTE W OR WO FOL WCON,DOPPLER: HCPCS

## 2025-04-17 PROCEDURE — 6360000002 HC RX W HCPCS: Performed by: INTERNAL MEDICINE

## 2025-04-17 PROCEDURE — 80053 COMPREHEN METABOLIC PANEL: CPT

## 2025-04-17 PROCEDURE — 84100 ASSAY OF PHOSPHORUS: CPT

## 2025-04-17 PROCEDURE — 97116 GAIT TRAINING THERAPY: CPT

## 2025-04-17 PROCEDURE — 6360000004 HC RX CONTRAST MEDICATION: Performed by: INTERNAL MEDICINE

## 2025-04-17 PROCEDURE — 2500000003 HC RX 250 WO HCPCS: Performed by: INTERNAL MEDICINE

## 2025-04-17 PROCEDURE — 82962 GLUCOSE BLOOD TEST: CPT

## 2025-04-17 PROCEDURE — 6370000000 HC RX 637 (ALT 250 FOR IP): Performed by: INTERNAL MEDICINE

## 2025-04-17 PROCEDURE — 85027 COMPLETE CBC AUTOMATED: CPT

## 2025-04-17 PROCEDURE — 97530 THERAPEUTIC ACTIVITIES: CPT

## 2025-04-17 PROCEDURE — 84443 ASSAY THYROID STIM HORMONE: CPT

## 2025-04-17 PROCEDURE — 97161 PT EVAL LOW COMPLEX 20 MIN: CPT

## 2025-04-17 RX ORDER — 0.9 % SODIUM CHLORIDE 0.9 %
250 INTRAVENOUS SOLUTION INTRAVENOUS ONCE
Status: DISCONTINUED | OUTPATIENT
Start: 2025-04-17 | End: 2025-04-17 | Stop reason: HOSPADM

## 2025-04-17 RX ADMIN — SULFUR HEXAFLUORIDE 2 ML: KIT at 09:46

## 2025-04-17 RX ADMIN — IRON SUCROSE 100 MG: 20 INJECTION, SOLUTION INTRAVENOUS at 08:26

## 2025-04-17 RX ADMIN — POTASSIUM CHLORIDE 20 MEQ: 750 TABLET, FILM COATED, EXTENDED RELEASE ORAL at 08:26

## 2025-04-17 RX ADMIN — FUROSEMIDE 40 MG: 10 INJECTION, SOLUTION INTRAMUSCULAR; INTRAVENOUS at 08:26

## 2025-04-17 RX ADMIN — Medication 10 ML: at 08:26

## 2025-04-17 RX ADMIN — EMPAGLIFLOZIN 10 MG: 10 TABLET, FILM COATED ORAL at 08:26

## 2025-04-17 RX ADMIN — ATORVASTATIN CALCIUM 80 MG: 40 TABLET, FILM COATED ORAL at 08:26

## 2025-04-17 NOTE — PLAN OF CARE
Problem: Physical Therapy - Adult  Goal: By Discharge: Performs mobility at highest level of function for planned discharge setting.  See evaluation for individualized goals.  Description: FUNCTIONAL STATUS PRIOR TO ADMISSION: Patient was independent and active without use of DME.    HOME SUPPORT PRIOR TO ADMISSION: Per pt, he lived alone with niece and family nearby to provide assistance. Per CM note, pt lives with niece. Pt is a questionable historian, has a h/o anoxic brain injury.    Physical Therapy Goals  Initiated 4/17/2025  1.  Patient will move from supine to sit and sit to supine in bed with modified independence within 7 day(s).    2.  Patient will perform sit to stand with modified independence within 7 day(s).  3.  Patient will transfer from bed to chair and chair to bed with modified independence using the least restrictive device within 7 day(s).  4.  Patient will ambulate with modified independence for 300 feet with the least restrictive device within 7 day(s).     Outcome: Progressing   PHYSICAL THERAPY EVALUATION    Patient: Edvin Leal (65 y.o. male)  Date: 4/17/2025  Primary Diagnosis: Shortness of breath [R06.02]  Bilateral leg edema [R60.0]  History of CHF (congestive heart failure) [Z86.79]  Acute exacerbation of chronic heart failure (HCC) [I50.9]       Precautions: Restrictions/Precautions  Restrictions/Precautions: Fall Risk            ASSESSMENT :   DEFICITS/IMPAIRMENTS:   The patient is limited by decreased functional mobility, strength, activity tolerance, safety awareness, cognition, coordination, balance . Of note, pt with orders for duplex of LE due to swelling that had not been performed. Discussed with Dr. Gallagher and cleared to mobilize patient.     Based on the impairments listed above patient presents slightly below baseline for functional mobility. Pt required frequent cues to redirect to tasks. Pt progressed to ambulation reaching out for support from wall/furniture.  Dynamic: Fair;Occasional  Ambulation/Gait Training:                       Gait  Gait Training: Yes  Overall Level of Assistance: Contact guard assistance  Distance (ft): 75 Feet (x2)  Assistive Device: Gait belt  Interventions: Demonstration;Safety awareness training;Verbal cues  Base of Support: Narrowed  Speed/Jamila: Pace decreased (< 100 feet/min)  Gait Abnormalities: Decreased step clearance;Path deviations                                                                                                                                                                                                                                   [facility] AM-PAC®      Basic Mobility Inpatient Short Form (6-Clicks) Version 2    How much help is needed turning from your back to your side while in a flat bed without using bedrails?: None  How much help is needed moving from lying on your back to sitting on the side of a flat bed without using bedrails?: None  How much help is needed moving to and from a bed to a chair?: A Little  How much help is needed standing up from a chair using your arms?: A Little  How much help is needed walking in hospital room?: A Little  How much help is needed climbing 3-5 steps with a railing?: A Little    -[facility] Inpatient Mobility Raw Score : 20  AM-PAC Inpatient T-Scale Score : 47.67     Cutoff score <=171,2,3 had higher odds of discharging home with home health or need of SNF/IPR.    1. Farzana Hernandez, Minnie Camacho, Slade Welch, Lashell Mclean, Asaf Holbrook, Angus Hernandez.  Validity of the -PAC “6-Clicks” Inpatient Daily Activity and Basic Mobility Short Forms. Physical Therapy Mar 2014, [phone]) [phone]; DOI: 10.2522/ptj.21590269  2. Giuseppe GARVEY, Zion QUINTANILLA, Umberto QUINTANILLA, Salomón QUINTANILLA. Association of AM-PAC \"6-Clicks\" Basic Mobility and Daily Activity Scores With Discharge Destination. Phys Ther. 2021 Apr 4;101(4):trwx294. doi: 10.1093/ptj/ystn187. PMID: 56062253.  3. Maddie

## 2025-04-17 NOTE — PROGRESS NOTES
Orders received, chart reviewed and patient evaluated by physical therapy. Pending progression with skilled acute physical therapy, recommend:    Intermittent physical therapy up to 2-3x/week in previous living setting with additional support needed for ambulation    Full evaluation to follow.

## 2025-04-17 NOTE — PROGRESS NOTES
Physical Therapy 4/17/2025    Orders received and chart reviewed up to date. Pt with bedside testing, will follow up later today.    Thank you.  Laura Barroso, PT, DPT

## 2025-04-17 NOTE — CARE COORDINATION
Transition of Care Plan:    RUR: 14%  Prior Level of Functioning: independent with ADLS  Disposition: home    YESICA: today or tomorrow   Follow up appointments: MD salvador   DME needed: N  Transportation at discharge: family   IM/IMM Medicare/ letter given: 4/15   Caregiver Contact: Gabriela / sister / [phone]  Discharge Caregiver contacted prior to discharge? Y  Care Conference needed? N  Barriers to discharge: medical stability    CM acknowledges PT/OT recs for HH services. HF team as well recommend HH SN services for medication mgmt.    CM spoke to pt sister, Gabriela, to determine if they would like HH services. Gabriela states that they assist him with medication management with weekly pill box and monitor him as well. She does not want HH services and states pt does not need.    Family to transport home once medically stable for DC.    CM will provide support as needed.    Francesca Ramirez RN BSN   [phone]

## 2025-04-17 NOTE — NURSE NAVIGATOR
Heart Failure Nurse Navigator rounds completed.    HF NN provided introduction to self and nurse navigator role. [facility] Discharge Packet for Patients Diagnosed with Heart Failure booklet given to patient, along with weight calendar, signs/symptoms magnet, and card with QR codes for HF video resources.       Self-care topics discussed:    Heart failure diagnosis and disease process.  Patient unable to verbalize why he is in the hospital or what he know about his cardiac issues.      Daily weights - Patient educated on weighing themselves daily and reporting 3 lbs. weight gain overnight or 5 lbs. in a week to their cardiologist.  Patient able to read magnet about daily weights and signs and symptoms of heart failure.     What to do if HF symptoms worsen -Signs and symptoms of heart failure     Sodium restricted diet (less than 2 g sodium per day) Patient unable to verbalize how he prepares food or how he obtains his meals.     Close follow up with PCP/Cardiologist    Perfect serve message sent to case management that patient would benefit from home health nursing.     Advised patient that follow up appointment will be scheduled and placed on Discharge Instructions prior to discharge. Will continue to follow until discharge.         Time spent with patient discussing HF education: 15 minutes

## 2025-04-17 NOTE — PLAN OF CARE
Problem: Chronic Conditions and Co-morbidities  Goal: Patient's chronic conditions and co-morbidity symptoms are monitored and maintained or improved  4/17/2025 1526 by Tash Virk RN  Outcome: Progressing  Flowsheets (Taken 4/17/2025 0800)  Care Plan - Patient's Chronic Conditions and Co-Morbidity Symptoms are Monitored and Maintained or Improved: Monitor and assess patient's chronic conditions and comorbid symptoms for stability, deterioration, or improvement  4/17/2025 0613 by Kenia Zaman RN  Outcome: Progressing     Problem: Discharge Planning  Goal: Discharge to home or other facility with appropriate resources  4/17/2025 1526 by Tash Virk, RN  Outcome: Progressing  Flowsheets (Taken 4/17/2025 0800)  Discharge to home or other facility with appropriate resources:   Identify barriers to discharge with patient and caregiver   Arrange for needed discharge resources and transportation as appropriate  4/17/2025 0613 by Kenia Zaman, RN  Outcome: Progressing

## 2025-04-17 NOTE — PROGRESS NOTES
Cardiology Progress Note                                        Admit Date: 4/15/2025    Assessment/Plan:     CHF; with leg edema; he responded to IV Lasix and Metolazone combination with a good diuresis;   CAD; s/p CABG; asymptomatic  Hypotension; due to good diuresis overnight; no BP regimen; continue IV Lasix    Edvin Leal is a 65 y.o. male with     PROBLEM LIST:  Patient Active Problem List    Diagnosis Date Noted    Ischemic cardiomyopathy 03/28/2025    Bilateral lower extremity edema 08/26/2022    Hyperlipidemia     Hypertension, essential     Diabetes mellitus with microalbuminuria (HCC)     Coronary artery disease involving native coronary artery of native heart without angina pectoris 03/03/2021    History of heart bypass surgery 02/08/2021    Hearing loss 11/04/2014    CLL (chronic lymphocytic leukemia) ([facility]) 06/27/2014    Hypoxic brain damage ([facility]) 06/17/2014         Subjective:     Edvin Leal reports none.    BP (!) 97/46   Pulse 97   Temp 97.2 °F (36.2 °C) (Oral)   Resp 20   Ht 1.753 m (5' 9\")   Wt 70.5 kg (155 lb 8 oz)   SpO2 96%   BMI 22.96 kg/m²     Intake/Output Summary (Last 24 hours) at 4/17/2025 1321  Last data filed at 4/17/2025 0950  Gross per 24 hour   Intake 240 ml   Output 4600 ml   Net -4360 ml       Objective:      Physical Exam:  HEENT: Perr[place], EOMI  Neck: No JVD,  No thyroidmegaly  Resp: CTA bilaterally; No wheezes or rales  CV: RRR s1s2 No murmur no s3  Abd:Soft, Nontender  Ext: 1+ edema  Neuro: Alert and oriented; Nonfocal  Skin: Warm, Dry, Intact  Pulses: 2+ DP/PT/Rad      Telemetry: normal sinus rhythm    Current Facility-Administered Medications   Medication Dose Route Frequency    sodium chloride 0.9 % bolus 250 mL  250 mL IntraVENous Once    atorvastatin (LIPITOR) tablet 80 mg  80 mg Oral Daily    empagliflozin (JARDIANCE) tablet 10 mg  10 mg Oral Daily    potassium chloride (KLOR-CON) extended release tablet 20 mEq  20 mEq Oral Daily    sodium chloride flush 0.9 %

## 2025-04-17 NOTE — DISCHARGE SUMMARY
Physician Discharge Summary     Patient ID:    Edvin Leal  [id number]  65 y.o.  1960    Admit date: 4/15/2025    Discharge date and time: 4/17/2025 12:49 PM    Discharge condition: Stable    Admission HPI:  Edvin Leal is a 65 y.o. male with past medical history significant for CHF, type 2 diabetes, dyslipidemia, ischemic cardiomyopathy, hypertension, hypoxic brain damage, chronic lymphocytic leukemia presented at the emergency room with increasing bilateral leg swelling.  Patient has also been having shortness of breath which is progressively getting worse especially with ambulation and also when the patient is lying down.  Was seen by his cardiologist today and patient was told that he needed admission to the hospital for IV diuretic.  Patient presented at the emergency room for that reason.  There was no associated chest pain or palpitation.  Patient is not a good historian because of his underlying hypoxic brain injury.  Patient was last admitted to the hospital from 3/5/2025 to 3/14/2025, patient was admitted [facility] and treated for CHF exacerbation.   He presented at the emergency room with BNP level of 424.  Patient received 60 mg of IV Lasix in the emergency room and was referred to the hospitalist service for admission.      Hospital Diagnoses and Treatment Rendered:     Acute on chronic CHF exacerbation with reduced EF;  Ischemic cardiomyopathy;  Coronary artery disease;  -s/p IV diuresis  -Continue home diuretic  -cannot start GDMT d/t low BP  -Echo 3/8/25 EF 45-50%  -per cardiology Dr. Mayda patel to dc today  -CTA chest: No pulmonary embolus. Increase in size of enlarged axillary, mediastinal, and hilar adenopathy as well as splenomegaly compatible with progression of CLL with associated loculated right pleural effusion and compression of the intrathoracic trachea and right and left mainstem bronchi   -Pulmonology consulted; f/up OP  - Patient is afebrile and has no leukocytosis  -pt  results for input(s): \"CPK\", \"CKMB\", \"TROPONINI\" in the last 72 hours.  No components found for: \"GLPOC\"    CTA CHEST W WO CONTRAST  Result Date: 4/16/2025  EXAM:  CTA CHEST W WO CONTRAST INDICATION: Shortness of breath. Evaluate for pneumonia and pulmonary embolus. History of CLL. COMPARISON: CT 1/14/2025, CT 6/25/2024, and PET/CT of 1/18/2022. TECHNIQUE: Helical thin section chest CT following intravenous administration of nonionic contrast 70 mL of isovue 370 according to departmental PE protocol. Coronal and sagittal reformats were performed. 3D post processing was performed.  CT dose reduction was achieved through the use of a standardized protocol tailored for this examination and automatic exposure control for dose modulation. FINDINGS: This is a good quality study for the evaluation of pulmonary embolism to the first subsegmental arterial level. There is no pulmonary embolism to this level. MEDIASTINUM: Interval increase in size of enlarged mediastinal adenopathy is redemonstrated with representative upper right paratracheal node measuring 1.9 cm (3/88, previously 1.6 cm 201/17), subcarinal node measuring 2.4 cm (3/75, previously 2.1 cm 201/31), and lower subcarinal node measuring 3.0 cm (3/62, previously 2.1 cm 201/31). BARRETT: Interval increase in bilateral hilar adenopathy with representative nodes measuring 1.6 cm on the right (3/76, previously 1.3 cm 201/26) and 2.0 cm (3/77, previously 1.1 cm (201/23). THORACIC AORTA: No aneurysm. HEART: Normal in size without pericardial effusion. Coronary artery calcifications are noted with postsurgical changes of CABG. ESOPHAGUS: No wall thickening or dilatation. TRACHEA/BRONCHI: There is loss of AP dimension of the trachea, progressive from prior with compression of the right and left mainstem bronchi from 1.3 cm to 0.9 cm and 1.3 cm to 0.5 cm respectively. Peripheral bronchi remain patent though there is some parabronchial thickening in the right lower lobe

## 2025-04-17 NOTE — PROGRESS NOTES
Spiritual Health History and Assessment/Progress Note  [facility]    Palliative Care,  ,  ,      Name: Edvin Leal MRN: [id number]    Age: 65 y.o.     Sex: male   Language: English   Orthodox: Voodoo   Acute exacerbation of chronic heart failure (HCC)     Date: 4/17/2025            Total Time Calculated: 10 min              Spiritual Assessment began in [facility] 2N MED SURG        Referral/Consult From: Palliative Care   Encounter Overview/Reason: Palliative Care  Service Provided For: Patient    Alyssa, Belief, Meaning:   Patient unable to assess at this time  Family/Friends No family/friends present      Importance and Influence:  Patient has no beliefs influential to healthcare decision-making identified during this visit  Family/Friends No family/friends present    Community:  Patient feels well-supported. Support system includes: Extended family  Family/Friends No family/friends present    Assessment and Plan of Care:     Patient Interventions include: Facilitated expression of thoughts and feelings  Family/Friends Interventions include: No family/friends present    Patient Plan of Care: Spiritual Care available upon further referral  Family/Friends Plan of Care: No family/friends present    I visited Edvin Leal for a palliative initial spiritual health assessment.     Brief conversation. He spoke about being discharged today and how happy he will be to go home.     Electronically signed by TEAGAN CHIANG on 4/17/2025 at 1:46 PM

## 2025-04-18 ENCOUNTER — TELEPHONE (OUTPATIENT)
Facility: HOSPITAL | Age: 65
End: 2025-04-18

## 2025-04-18 NOTE — TELEPHONE ENCOUNTER
HF  NN attempted to reach patient for post discharge follow up call.  Voicemail belongs to someone other than patient, so did not leave any message.

## 2025-04-30 DIAGNOSIS — E78.00 PURE HYPERCHOLESTEROLEMIA, UNSPECIFIED: ICD-10-CM

## 2025-04-30 RX ORDER — ATORVASTATIN CALCIUM 80 MG/1
80 TABLET, FILM COATED ORAL DAILY
Qty: 90 TABLET | Refills: 1 | Status: SHIPPED | OUTPATIENT
Start: 2025-04-30

## 2025-04-30 RX ORDER — GLIPIZIDE 5 MG/1
5 TABLET ORAL DAILY
Qty: 90 TABLET | Refills: 1 | Status: SHIPPED | OUTPATIENT
Start: 2025-04-30

## 2025-07-10 ENCOUNTER — TRANSCRIBE ORDERS (OUTPATIENT)
Facility: HOSPITAL | Age: 65
End: 2025-07-10

## 2025-07-10 DIAGNOSIS — Z51.11 ENCOUNTER FOR ANTINEOPLASTIC CHEMOTHERAPY: Primary | ICD-10-CM

## 2025-07-10 DIAGNOSIS — J90 PLEURAL EFFUSION: ICD-10-CM

## 2025-07-25 ENCOUNTER — OFFICE VISIT (OUTPATIENT)
Age: 65
End: 2025-07-25
Payer: MEDICARE

## 2025-07-25 VITALS
SYSTOLIC BLOOD PRESSURE: 102 MMHG | DIASTOLIC BLOOD PRESSURE: 68 MMHG | OXYGEN SATURATION: 98 % | WEIGHT: 153.6 LBS | HEART RATE: 70 BPM | HEIGHT: 69 IN | RESPIRATION RATE: 18 BRPM | BODY MASS INDEX: 22.75 KG/M2

## 2025-07-25 DIAGNOSIS — H61.23 IMPACTED CERUMEN, BILATERAL: Primary | ICD-10-CM

## 2025-07-25 DIAGNOSIS — H90.3 BILATERAL SENSORINEURAL HEARING LOSS: ICD-10-CM

## 2025-07-25 DIAGNOSIS — H60.501 ACUTE OTITIS EXTERNA OF RIGHT EAR, UNSPECIFIED TYPE: ICD-10-CM

## 2025-07-25 PROCEDURE — 1036F TOBACCO NON-USER: CPT

## 2025-07-25 PROCEDURE — 3078F DIAST BP <80 MM HG: CPT

## 2025-07-25 PROCEDURE — 99213 OFFICE O/P EST LOW 20 MIN: CPT

## 2025-07-25 PROCEDURE — 1159F MED LIST DOCD IN RCRD: CPT

## 2025-07-25 PROCEDURE — 3074F SYST BP LT 130 MM HG: CPT

## 2025-07-25 PROCEDURE — 4130F TOPICAL PREP RX AOE: CPT

## 2025-07-25 PROCEDURE — 1123F ACP DISCUSS/DSCN MKR DOCD: CPT

## 2025-07-25 PROCEDURE — G8420 CALC BMI NORM PARAMETERS: HCPCS

## 2025-07-25 PROCEDURE — G8427 DOCREV CUR MEDS BY ELIG CLIN: HCPCS

## 2025-07-25 PROCEDURE — 3017F COLORECTAL CA SCREEN DOC REV: CPT

## 2025-07-25 PROCEDURE — 69210 REMOVE IMPACTED EAR WAX UNI: CPT

## 2025-07-25 RX ORDER — METOPROLOL SUCCINATE 25 MG/1
TABLET, EXTENDED RELEASE ORAL
COMMUNITY
Start: 2025-06-18

## 2025-07-25 NOTE — PROGRESS NOTES
Septum midline. Turbinates without hypertrophy.  Oral Cavity / Oropharynx: No trismus. Mucosa pink and moist. No lesions. Tongue is midline and mobile. Palate elevates symmetrically. Uvula midline. Tonsils unremarkable. Base of tongue soft. Floor of mouth soft.   Neck: Supple. No adenopathy. Thyroid unremarkable. Palpable laryngeal landmarks. Full neck range of motion   Neurologic: No focal deficit. Gait abnormal.     Procedures:     PROCEDURE: BILATERAL MICROSCOPY WITH CERUMEN DEBRIDEMENT (CPT 49772-77)  Using the microscope, both ears were examined. A 7 Fr suction, loop curette, and alligator were used to debride the ears of cerumen revealing the above-noted findings. Patient tolerated the procedure well     Assessment/Plan:     Bilateral cerumen impaction  Debrided today, which patient tolerated well. Following debridement, patient noted resolution of ear fullness and slight improvement in hearing    Otitis externa, right ear  Following debridement, erythema and mild swelling noted of right ear canal. No otorrhea.   Prescribed ciprodex for the right ear    Bilateral sensorineural hearing loss  Reviewed previous audiometry from 3/17/2023, which had fair reliability, limiting interpretation - showed moderate to severe sensorineural hearing loss in right ear and severe to profound (likely sensorineural) hearing loss in left ear. Patient was recommended hearing aids which he has not pursued.  Discussed obtaining repeat audiometry and possibly imaging if results show persistent or worsening asymmetry. Patient and family declined repeat audiometry at this time and state they are not interested in hearing amplification.    Based on the significance of cerumen impaction, I recommend routine cerumen removal in office every 6 months.    Follow up in 6 months.     The patient was instructed to return to clinic sooner if no improvement or progression of symptoms.Signs to watch out for reviewed.    JAMES Daugherty

## 2025-07-26 RX ORDER — CIPROFLOXACIN AND DEXAMETHASONE 3; 1 MG/ML; MG/ML
4 SUSPENSION/ DROPS AURICULAR (OTIC) 2 TIMES DAILY
Qty: 7.5 ML | Refills: 0 | Status: SHIPPED | OUTPATIENT
Start: 2025-07-26 | End: 2025-08-02

## 2025-07-28 ENCOUNTER — PATIENT MESSAGE (OUTPATIENT)
Age: 65
End: 2025-07-28

## 2025-07-28 NOTE — TELEPHONE ENCOUNTER
I spoke with the patient's sister over the phone, regarding recent episode of right ear bleeding and vertigo over the weekend. She states he was doing well on Friday, but noted dried blood along the right outer ear and vertigo (described as walls moving) on Saturday morning.  He was taken to the ER on Saturday, where a small blood clot was removed from his right ear canal and packing was placed. Vertigo resolved and has not recurred.  Packing was removed at home yesterday and there has been no further bleeding.  Denies otalgia or otorrhea. She notes his hearing has been decreased in the right ear since Saturday.    I reviewed with the patient that no bleeding was noted at the conclusion of the last visit, though the right ear canal did appear dry, erythematous, and inflamed post-debridement - for which I had prescribed ciprodex drops. I inquired about any possible injury between Friday and Saturday, but she reports no known trauma or incidents.    She states she was unable to  the prescription from the pharmacy over the weekend as it was out of stock, but she is able to pick it up today. I advised initiating Ciprodex drops as prescribed.    I discussed the prior 2023 audiogram showing moderate to severe hearing SNHL in the right ear, and severe to profound likely SNHL in the left ear.  At the recent visit, I had discussed the role of repeat audiometry, but family declined as they were not interested in hearing aids, which were previously recommended. Now that right ear hearing appears to have worsened, I strongly recommend repeat audiometry to reassess.  While reliability of testing may not be ideal due to patient's cognitive impairment, it is worthwhile to re-attempt.  Patient's sister is agreeable to the plan.    Recommended follow-up visit along and repeat audiometry within the next week. Patient sister notes she will call the office to schedule based on family's availability. All questions answered.

## 2025-07-31 DIAGNOSIS — E11.29 TYPE 2 DIABETES MELLITUS WITH OTHER DIABETIC KIDNEY COMPLICATION (HCC): ICD-10-CM

## 2025-07-31 RX ORDER — POTASSIUM CHLORIDE 1500 MG/1
20 TABLET, EXTENDED RELEASE ORAL DAILY
Qty: 90 TABLET | Refills: 3 | Status: SHIPPED | OUTPATIENT
Start: 2025-07-31

## 2025-08-05 ENCOUNTER — OFFICE VISIT (OUTPATIENT)
Age: 65
End: 2025-08-05
Payer: MEDICARE

## 2025-08-05 VITALS
HEIGHT: 69 IN | DIASTOLIC BLOOD PRESSURE: 64 MMHG | BODY MASS INDEX: 22.66 KG/M2 | WEIGHT: 153 LBS | SYSTOLIC BLOOD PRESSURE: 110 MMHG

## 2025-08-05 DIAGNOSIS — H60.501 ACUTE OTITIS EXTERNA OF RIGHT EAR, UNSPECIFIED TYPE: Primary | ICD-10-CM

## 2025-08-05 DIAGNOSIS — H90.3 BILATERAL SENSORINEURAL HEARING LOSS: ICD-10-CM

## 2025-08-05 PROCEDURE — G8427 DOCREV CUR MEDS BY ELIG CLIN: HCPCS

## 2025-08-05 PROCEDURE — 99213 OFFICE O/P EST LOW 20 MIN: CPT

## 2025-08-05 PROCEDURE — 1159F MED LIST DOCD IN RCRD: CPT

## 2025-08-05 PROCEDURE — 3074F SYST BP LT 130 MM HG: CPT

## 2025-08-05 PROCEDURE — 3078F DIAST BP <80 MM HG: CPT

## 2025-08-05 PROCEDURE — G8420 CALC BMI NORM PARAMETERS: HCPCS

## 2025-08-05 PROCEDURE — 1123F ACP DISCUSS/DSCN MKR DOCD: CPT

## 2025-08-05 PROCEDURE — 1126F AMNT PAIN NOTED NONE PRSNT: CPT

## 2025-08-05 PROCEDURE — 3017F COLORECTAL CA SCREEN DOC REV: CPT

## 2025-08-05 PROCEDURE — 4130F TOPICAL PREP RX AOE: CPT

## 2025-08-05 PROCEDURE — 1036F TOBACCO NON-USER: CPT
